# Patient Record
Sex: MALE | Race: WHITE | Employment: FULL TIME | URBAN - NONMETROPOLITAN AREA
[De-identification: names, ages, dates, MRNs, and addresses within clinical notes are randomized per-mention and may not be internally consistent; named-entity substitution may affect disease eponyms.]

---

## 2024-04-25 ENCOUNTER — HOSPITAL ENCOUNTER (EMERGENCY)
Age: 54
Discharge: HOME | End: 2024-04-25
Attending: EMERGENCY MEDICINE

## 2024-04-25 VITALS
SYSTOLIC BLOOD PRESSURE: 190 MMHG | OXYGEN SATURATION: 93 % | DIASTOLIC BLOOD PRESSURE: 149 MMHG | WEIGHT: 271.7 LBS | HEIGHT: 69 IN | RESPIRATION RATE: 20 BRPM | BODY MASS INDEX: 40.24 KG/M2 | HEART RATE: 112 BPM | TEMPERATURE: 97.9 F

## 2024-04-25 DIAGNOSIS — I10 ESSENTIAL HYPERTENSION: ICD-10-CM

## 2024-04-25 DIAGNOSIS — R20.2 PARESTHESIA: Primary | ICD-10-CM

## 2024-04-25 LAB
ANION GAP SERPL CALCULATED.3IONS-SCNC: 13 MMOL/L (ref 9–17)
BUN SERPL-MCNC: 11 MG/DL (ref 6–20)
BUN/CREAT SERPL: 9 (ref 9–20)
CALCIUM SERPL-MCNC: 8.6 MG/DL (ref 8.6–10.4)
CHLORIDE SERPL-SCNC: 101 MMOL/L (ref 98–107)
CO2 SERPL-SCNC: 23 MMOL/L (ref 20–31)
CREAT SERPL-MCNC: 1.2 MG/DL (ref 0.7–1.2)
ERYTHROCYTE [DISTWIDTH] IN BLOOD BY AUTOMATED COUNT: 14.3 % (ref 12.1–15.2)
GFR SERPL CREATININE-BSD FRML MDRD: 72 ML/MIN/1.73M2
GLUCOSE SERPL-MCNC: 121 MG/DL (ref 70–99)
HCT VFR BLD AUTO: 52.1 % (ref 41–53)
HGB BLD-MCNC: 17.3 G/DL (ref 13.5–17.5)
MCH RBC QN AUTO: 27.3 PG (ref 26–34)
MCHC RBC AUTO-ENTMCNC: 33.2 G/DL (ref 31–37)
MCV RBC AUTO: 82.3 FL (ref 80–100)
PLATELET # BLD AUTO: 266 K/UL (ref 140–450)
PMV BLD AUTO: 9.1 FL (ref 6–12)
POTASSIUM SERPL-SCNC: 3.8 MMOL/L (ref 3.7–5.3)
RBC # BLD AUTO: 6.33 M/UL (ref 4.5–5.9)
SODIUM SERPL-SCNC: 137 MMOL/L (ref 135–144)
TROPONIN I SERPL HS-MCNC: 32 NG/L (ref 0–22)
WBC OTHER # BLD: 10.2 K/UL (ref 3.5–11)

## 2024-04-25 PROCEDURE — 96374 THER/PROPH/DIAG INJ IV PUSH: CPT

## 2024-04-25 PROCEDURE — 99284 EMERGENCY DEPT VISIT MOD MDM: CPT

## 2024-04-25 PROCEDURE — 6360000002 HC RX W HCPCS: Performed by: EMERGENCY MEDICINE

## 2024-04-25 PROCEDURE — 84484 ASSAY OF TROPONIN QUANT: CPT

## 2024-04-25 PROCEDURE — 36415 COLL VENOUS BLD VENIPUNCTURE: CPT

## 2024-04-25 PROCEDURE — 85027 COMPLETE CBC AUTOMATED: CPT

## 2024-04-25 PROCEDURE — 93005 ELECTROCARDIOGRAM TRACING: CPT | Performed by: EMERGENCY MEDICINE

## 2024-04-25 PROCEDURE — 80048 BASIC METABOLIC PNL TOTAL CA: CPT

## 2024-04-25 RX ORDER — METOPROLOL SUCCINATE 50 MG/1
50 TABLET, EXTENDED RELEASE ORAL DAILY
Qty: 90 TABLET | Refills: 1 | Status: SHIPPED | OUTPATIENT
Start: 2024-04-25

## 2024-04-25 RX ORDER — LABETALOL HYDROCHLORIDE 5 MG/ML
10 INJECTION, SOLUTION INTRAVENOUS ONCE
Status: COMPLETED | OUTPATIENT
Start: 2024-04-25 | End: 2024-04-25

## 2024-04-25 RX ADMIN — LABETALOL HYDROCHLORIDE 10 MG: 5 INJECTION INTRAVENOUS at 11:53

## 2024-04-25 ASSESSMENT — LIFESTYLE VARIABLES
HOW MANY STANDARD DRINKS CONTAINING ALCOHOL DO YOU HAVE ON A TYPICAL DAY: PATIENT DOES NOT DRINK
HOW OFTEN DO YOU HAVE A DRINK CONTAINING ALCOHOL: NEVER

## 2024-04-25 ASSESSMENT — PAIN - FUNCTIONAL ASSESSMENT: PAIN_FUNCTIONAL_ASSESSMENT: NONE - DENIES PAIN

## 2024-04-25 ASSESSMENT — ENCOUNTER SYMPTOMS
EYE DISCHARGE: 0
SHORTNESS OF BREATH: 0
WHEEZING: 0

## 2024-04-25 NOTE — ED TRIAGE NOTES
Patient states that he does not taking any daily medications at this time, also he states he has no medical or surgical history to report at this time. Daughter and grandson at bedside.

## 2024-04-25 NOTE — ED NOTES
Patient states he does not have insurance, states he needs to talk to his work. Patient was given a list of Primary care physicians and told to figure out his insurance then make an appointment. Patient verbalized understanding.

## 2024-04-25 NOTE — ED PROVIDER NOTES
more data below for the lab and radiology tests and orders.    3)  Treatment and Disposition    Patient repeat assessment:  bp improved with BB will dc home on oral BB    Disposition discussion with patient/family:      Case discussed with consulting clinician:      Social determinants of health impacting treatment or disposition:      Shared Decision Making:      Code Status Discussion:        REASSESSMENT            CRITICAL CARE TIME     Total Critical Care time was  minutes, excluding separately reportable procedures.  There was a high probability of clinically significant/life threatening deterioration in the patient's condition which required my urgent intervention.      PROCEDURES:  Unless otherwise noted below, none     Procedures    FINAL IMPRESSION      1. Paresthesia    2. Essential hypertension          DISPOSITION/PLAN     DISPOSITION Decision To Discharge 04/25/2024 12:30:57 PM      PATIENT REFERRED TO:  [facility]  [address] [address]  [phone]  In 1 week        DISCHARGE MEDICATIONS:  New Prescriptions    METOPROLOL SUCCINATE (TOPROL XL) 50 MG EXTENDED RELEASE TABLET    Take 1 tablet by mouth daily       (Please note that portions of this note were completed with a voice recognition program.  Efforts were made to edit the dictations but occasionally words are mis-transcribed.)    Hardik Chanel MD (electronically signed)  Attending Emergency Physician           Hardik Chanel MD  04/25/24 [id number]

## 2024-04-26 LAB
EKG ATRIAL RATE: 114 BPM
EKG P AXIS: 57 DEGREES
EKG P-R INTERVAL: 142 MS
EKG Q-T INTERVAL: 354 MS
EKG QRS DURATION: 100 MS
EKG QTC CALCULATION (BAZETT): 487 MS
EKG R AXIS: 36 DEGREES
EKG T AXIS: 66 DEGREES
EKG VENTRICULAR RATE: 114 BPM

## 2024-04-26 PROCEDURE — 93010 ELECTROCARDIOGRAM REPORT: CPT | Performed by: INTERNAL MEDICINE

## 2024-06-11 ENCOUNTER — OFFICE VISIT (OUTPATIENT)
Dept: CARDIAC SURGERY | Facility: CLINIC | Age: 54
End: 2024-06-11

## 2024-06-11 VITALS
HEIGHT: 69 IN | SYSTOLIC BLOOD PRESSURE: 153 MMHG | OXYGEN SATURATION: 98 % | DIASTOLIC BLOOD PRESSURE: 84 MMHG | WEIGHT: 246 LBS | HEART RATE: 69 BPM | TEMPERATURE: 98.4 F | BODY MASS INDEX: 36.43 KG/M2

## 2024-06-11 DIAGNOSIS — I25.119 CORONARY ARTERY DISEASE INVOLVING NATIVE CORONARY ARTERY OF NATIVE HEART WITH ANGINA PECTORIS (CMS-HCC): Primary | ICD-10-CM

## 2024-06-11 DIAGNOSIS — I25.118 CORONARY ARTERY DISEASE OF NATIVE ARTERY OF NATIVE HEART WITH STABLE ANGINA PECTORIS (CMS-HCC): ICD-10-CM

## 2024-06-11 PROBLEM — I10 HTN (HYPERTENSION): Status: ACTIVE | Noted: 2024-06-11

## 2024-06-11 PROBLEM — G47.33 OSA (OBSTRUCTIVE SLEEP APNEA): Status: ACTIVE | Noted: 2024-06-11

## 2024-06-11 PROBLEM — I63.9 CVA (CEREBRAL VASCULAR ACCIDENT) (MULTI): Status: ACTIVE | Noted: 2024-06-11

## 2024-06-11 PROBLEM — E78.5 DYSLIPIDEMIA: Status: ACTIVE | Noted: 2024-06-11

## 2024-06-11 PROCEDURE — 3077F SYST BP >= 140 MM HG: CPT | Performed by: THORACIC SURGERY (CARDIOTHORACIC VASCULAR SURGERY)

## 2024-06-11 PROCEDURE — 99205 OFFICE O/P NEW HI 60 MIN: CPT | Performed by: THORACIC SURGERY (CARDIOTHORACIC VASCULAR SURGERY)

## 2024-06-11 PROCEDURE — 3079F DIAST BP 80-89 MM HG: CPT | Performed by: THORACIC SURGERY (CARDIOTHORACIC VASCULAR SURGERY)

## 2024-06-11 PROCEDURE — 99215 OFFICE O/P EST HI 40 MIN: CPT | Performed by: THORACIC SURGERY (CARDIOTHORACIC VASCULAR SURGERY)

## 2024-06-11 RX ORDER — LEVOTHYROXINE SODIUM 50 UG/1
50 TABLET ORAL
COMMUNITY
Start: 2024-06-05

## 2024-06-11 RX ORDER — METOPROLOL SUCCINATE 50 MG/1
50 TABLET, EXTENDED RELEASE ORAL
COMMUNITY
Start: 2024-04-25

## 2024-06-11 RX ORDER — FUROSEMIDE 40 MG/1
40 TABLET ORAL
COMMUNITY
Start: 2024-06-04

## 2024-06-11 RX ORDER — ATORVASTATIN CALCIUM 40 MG/1
40 TABLET, FILM COATED ORAL
COMMUNITY
Start: 2024-06-04

## 2024-06-11 RX ORDER — LISINOPRIL 10 MG/1
10 TABLET ORAL
COMMUNITY
Start: 2024-06-03

## 2024-06-11 RX ORDER — ASPIRIN 81 MG/1
1 TABLET ORAL
COMMUNITY
Start: 2024-04-28

## 2024-06-11 ASSESSMENT — PATIENT HEALTH QUESTIONNAIRE - PHQ9
1. LITTLE INTEREST OR PLEASURE IN DOING THINGS: NOT AT ALL
SUM OF ALL RESPONSES TO PHQ9 QUESTIONS 1 AND 2: 0
2. FEELING DOWN, DEPRESSED OR HOPELESS: NOT AT ALL

## 2024-06-12 PROBLEM — I25.118 CORONARY ARTERY DISEASE OF NATIVE ARTERY OF NATIVE HEART WITH STABLE ANGINA PECTORIS (CMS-HCC): Status: ACTIVE | Noted: 2024-06-11

## 2024-06-12 RX ORDER — SODIUM CHLORIDE, SODIUM LACTATE, POTASSIUM CHLORIDE, CALCIUM CHLORIDE 600; 310; 30; 20 MG/100ML; MG/100ML; MG/100ML; MG/100ML
20 INJECTION, SOLUTION INTRAVENOUS CONTINUOUS
OUTPATIENT
Start: 2024-06-12

## 2024-06-12 NOTE — PROGRESS NOTES
Chief Complaint  Stroke symptoms, arm weakness and slurred speech    HPI:   Mr. Kamar Garcia is an 53 y.o. male, who is a patient of Dr. Cabrera   I have been asked to see them to evaluate multivessel coronary artery disease.       Past Medical History:   Diagnosis Date    Dyslipidemia     HTN (hypertension)     Stroke (Multi)        Past Surgical History:   Procedure Laterality Date    HERNIA REPAIR         Family History   Problem Relation Name Age of Onset    Pancreatic cancer Mother      COPD Father         Social History     Socioeconomic History    Marital status: Unknown     Spouse name: Not on file    Number of children: Not on file    Years of education: Not on file    Highest education level: Not on file   Occupational History    Not on file   Tobacco Use    Smoking status: Former     Types: Cigarettes    Smokeless tobacco: Former     Types: Snuff   Substance and Sexual Activity    Alcohol use: Never    Drug use: Never    Sexual activity: Not on file   Other Topics Concern    Not on file   Social History Narrative    Not on file     Social Determinants of Health     Financial Resource Strain: Not on file   Food Insecurity: Not on file   Transportation Needs: Not on file   Physical Activity: Not on file   Stress: Not on file   Social Connections: Not on file   Intimate Partner Violence: Not on file   Housing Stability: Not on file       No Known Allergies    Outpatient Encounter Medications as of 6/11/2024   Medication Sig Dispense Refill    aspirin 81 mg EC tablet Take 1 tablet (81 mg) by mouth early in the morning..      atorvastatin (Lipitor) 40 mg tablet Take 1 tablet (40 mg) by mouth early in the morning..      furosemide (Lasix) 40 mg tablet Take 1 tablet (40 mg) by mouth early in the morning..      levothyroxine (Synthroid, Levoxyl) 50 mcg tablet Take 1 tablet (50 mcg) by mouth early in the morning..      lisinopril 10 mg tablet Take 1 tablet (10 mg) by mouth early in the morning..      metoprolol  succinate XL (Toprol-XL) 50 mg 24 hr tablet Take 1 tablet (50 mg) by mouth early in the morning..       No facility-administered encounter medications on file as of 6/11/2024.         Physical Exam  Constitutional:       General: He is not in acute distress.     Cardiovascular:      Rate and Rhythm: Normal rate.     Pulses: Normal pulses.      Heart sounds:Normal  Pulmonary:      Effort: Pulmonary effort is normal.      Breath sounds: Normal breath sounds.      Neurological:      General: No focal deficit present.      Mental Status: He is alert.     Assessment and Plan:   Mr. Kamar Garcia is an 53 y.o. male who presents with arm weakness and slurred speech.  His MRI showed area of restricted diffusion present in the superior junction of the right frontal and parietal lobes consistent with an acute/subacute ischemic infarct during investigations he had an echo that showed an ejection fraction of 35 to 40%  His left cath showed multivessel coronary artery disease including his LAD, D1, circumflex, OM1, OM 2 and RCA stenosis which were confirmed by FFR    We had a nice discussion regarding the indications for intervention on his coronary artery disease.  This included percutaneous as well as open surgical approaches. I do believe that surgical coronary revascularization is in his best interest, given the coronary anatomy and presentation of his symptoms.  He understands that the goal of this procedure will be to relieve symptoms if present, preserve left ventricular function, prevent future myocardial infarction, and prolong life.  I discussed the specific risks of bleeding, infection, transfusion, myocardial infarction, stroke, renal failure, up to and including death.  He has agreed to surgery which will be scheduled at [facility] most probably June 26th   My operative plan will be for on-pump, arrested heart, surgical coronary revascularization using BIMA if feasible and veins     Hubert Lovell MD

## 2024-06-12 NOTE — H&P (VIEW-ONLY)
Chief Complaint  Stroke symptoms, arm weakness and slurred speech    HPI:   Mr. Kamar Garcia is an 53 y.o. male, who is a patient of Dr. Cabrera   I have been asked to see them to evaluate multivessel coronary artery disease.       Past Medical History:   Diagnosis Date    Dyslipidemia     HTN (hypertension)     Stroke (Multi)        Past Surgical History:   Procedure Laterality Date    HERNIA REPAIR         Family History   Problem Relation Name Age of Onset    Pancreatic cancer Mother      COPD Father         Social History     Socioeconomic History    Marital status: Unknown     Spouse name: Not on file    Number of children: Not on file    Years of education: Not on file    Highest education level: Not on file   Occupational History    Not on file   Tobacco Use    Smoking status: Former     Types: Cigarettes    Smokeless tobacco: Former     Types: Snuff   Substance and Sexual Activity    Alcohol use: Never    Drug use: Never    Sexual activity: Not on file   Other Topics Concern    Not on file   Social History Narrative    Not on file     Social Determinants of Health     Financial Resource Strain: Not on file   Food Insecurity: Not on file   Transportation Needs: Not on file   Physical Activity: Not on file   Stress: Not on file   Social Connections: Not on file   Intimate Partner Violence: Not on file   Housing Stability: Not on file       No Known Allergies    Outpatient Encounter Medications as of 6/11/2024   Medication Sig Dispense Refill    aspirin 81 mg EC tablet Take 1 tablet (81 mg) by mouth early in the morning..      atorvastatin (Lipitor) 40 mg tablet Take 1 tablet (40 mg) by mouth early in the morning..      furosemide (Lasix) 40 mg tablet Take 1 tablet (40 mg) by mouth early in the morning..      levothyroxine (Synthroid, Levoxyl) 50 mcg tablet Take 1 tablet (50 mcg) by mouth early in the morning..      lisinopril 10 mg tablet Take 1 tablet (10 mg) by mouth early in the morning..      metoprolol  succinate XL (Toprol-XL) 50 mg 24 hr tablet Take 1 tablet (50 mg) by mouth early in the morning..       No facility-administered encounter medications on file as of 6/11/2024.         Physical Exam  Constitutional:       General: He is not in acute distress.     Cardiovascular:      Rate and Rhythm: Normal rate.     Pulses: Normal pulses.      Heart sounds:Normal  Pulmonary:      Effort: Pulmonary effort is normal.      Breath sounds: Normal breath sounds.      Neurological:      General: No focal deficit present.      Mental Status: He is alert.     Assessment and Plan:   Mr. Kamar Garcia is an 53 y.o. male who presents with arm weakness and slurred speech.  His MRI showed area of restricted diffusion present in the superior junction of the right frontal and parietal lobes consistent with an acute/subacute ischemic infarct during investigations he had an echo that showed an ejection fraction of 35 to 40%  His left cath showed multivessel coronary artery disease including his LAD, D1, circumflex, OM1, OM 2 and RCA stenosis which were confirmed by FFR    We had a nice discussion regarding the indications for intervention on his coronary artery disease.  This included percutaneous as well as open surgical approaches. I do believe that surgical coronary revascularization is in his best interest, given the coronary anatomy and presentation of his symptoms.  He understands that the goal of this procedure will be to relieve symptoms if present, preserve left ventricular function, prevent future myocardial infarction, and prolong life.  I discussed the specific risks of bleeding, infection, transfusion, myocardial infarction, stroke, renal failure, up to and including death.  He has agreed to surgery which will be scheduled at [facility] most probably June 26th   My operative plan will be for on-pump, arrested heart, surgical coronary revascularization using BIMA if feasible and veins     Hubert Lovell MD

## 2024-06-19 ENCOUNTER — HOSPITAL ENCOUNTER (OUTPATIENT)
Dept: RADIOLOGY | Facility: HOSPITAL | Age: 54
Discharge: HOME | End: 2024-06-19

## 2024-06-19 ENCOUNTER — LAB (OUTPATIENT)
Dept: LAB | Facility: HOSPITAL | Age: 54
End: 2024-06-19

## 2024-06-19 ENCOUNTER — HOSPITAL ENCOUNTER (OUTPATIENT)
Dept: RESPIRATORY THERAPY | Facility: HOSPITAL | Age: 54
Discharge: HOME | End: 2024-06-19

## 2024-06-19 DIAGNOSIS — I25.119 CORONARY ARTERY DISEASE INVOLVING NATIVE CORONARY ARTERY OF NATIVE HEART WITH ANGINA PECTORIS (CMS-HCC): ICD-10-CM

## 2024-06-19 LAB
ABO GROUP (TYPE) IN BLOOD: NORMAL
ALBUMIN SERPL BCP-MCNC: 4.6 G/DL (ref 3.4–5)
ALP SERPL-CCNC: 133 U/L (ref 33–120)
ALT SERPL W P-5'-P-CCNC: 100 U/L (ref 10–52)
ANION GAP BLDA CALCULATED.4IONS-SCNC: 14 MMO/L (ref 10–25)
ANION GAP SERPL CALC-SCNC: 16 MMOL/L (ref 10–20)
ANTIBODY SCREEN: NORMAL
APTT PPP: 34 SECONDS (ref 27–38)
ARTERIAL PATENCY WRIST A: POSITIVE
AST SERPL W P-5'-P-CCNC: 40 U/L (ref 9–39)
BASE EXCESS BLDA CALC-SCNC: -0.5 MMOL/L (ref -2–3)
BILIRUB SERPL-MCNC: 1.3 MG/DL (ref 0–1.2)
BODY TEMPERATURE: ABNORMAL
BUN SERPL-MCNC: 29 MG/DL (ref 6–23)
CA-I BLDA-SCNC: 1.24 MMOL/L (ref 1.1–1.33)
CALCIUM SERPL-MCNC: 10.1 MG/DL (ref 8.6–10.3)
CHLORIDE BLDA-SCNC: 100 MMOL/L (ref 98–107)
CHLORIDE SERPL-SCNC: 104 MMOL/L (ref 98–107)
CO2 SERPL-SCNC: 24 MMOL/L (ref 21–32)
CREAT SERPL-MCNC: 1.33 MG/DL (ref 0.5–1.3)
EGFRCR SERPLBLD CKD-EPI 2021: 64 ML/MIN/1.73M*2
ERYTHROCYTE [DISTWIDTH] IN BLOOD BY AUTOMATED COUNT: 14.4 % (ref 11.5–14.5)
FIBRINOGEN PPP-MCNC: 415 MG/DL (ref 200–400)
GLUCOSE BLDA-MCNC: 178 MG/DL (ref 74–99)
GLUCOSE SERPL-MCNC: 101 MG/DL (ref 74–99)
HCO3 BLDA-SCNC: 23.4 MMOL/L (ref 22–26)
HCT VFR BLD AUTO: 57 % (ref 41–52)
HCT VFR BLD EST: 55 % (ref 41–52)
HGB BLD-MCNC: 19.3 G/DL (ref 13.5–17.5)
HGB BLDA-MCNC: 18.3 G/DL (ref 13.5–17.5)
INHALED O2 CONCENTRATION: 21 %
INR PPP: 1 (ref 0.9–1.1)
LACTATE BLDA-SCNC: 1.5 MMOL/L (ref 0.4–2)
MAGNESIUM SERPL-MCNC: 2.26 MG/DL (ref 1.6–2.4)
MCH RBC QN AUTO: 27.8 PG (ref 26–34)
MCHC RBC AUTO-ENTMCNC: 33.9 G/DL (ref 32–36)
MCV RBC AUTO: 82 FL (ref 80–100)
MGC ASCENT PFT - FEV1 - POST: 2.81
MGC ASCENT PFT - FEV1 - POST: 2.81
MGC ASCENT PFT - FEV1 - PRE: 2.75
MGC ASCENT PFT - FEV1 - PRE: 2.75
MGC ASCENT PFT - FEV1 - PREDICTED: 3.48
MGC ASCENT PFT - FEV1 - PREDICTED: 3.48
MGC ASCENT PFT - FVC - POST: 3.8
MGC ASCENT PFT - FVC - POST: 3.8
MGC ASCENT PFT - FVC - PRE: 3.7
MGC ASCENT PFT - FVC - PRE: 3.7
MGC ASCENT PFT - FVC - PREDICTED: 4.38
MGC ASCENT PFT - FVC - PREDICTED: 4.38
NRBC BLD-RTO: 0 /100 WBCS (ref 0–0)
OXYHGB MFR BLDA: 96.2 % (ref 94–98)
PCO2 BLDA: 36 MM HG (ref 38–42)
PH BLDA: 7.42 PH (ref 7.38–7.42)
PLATELET # BLD AUTO: 312 X10*3/UL (ref 150–450)
PO2 BLDA: 92 MM HG (ref 85–95)
POTASSIUM BLDA-SCNC: 4.2 MMOL/L (ref 3.5–5.3)
POTASSIUM SERPL-SCNC: 4.7 MMOL/L (ref 3.5–5.3)
PROT SERPL-MCNC: 7.9 G/DL (ref 6.4–8.2)
PROTHROMBIN TIME: 11.7 SECONDS (ref 9.8–12.8)
RBC # BLD AUTO: 6.93 X10*6/UL (ref 4.5–5.9)
RH FACTOR (ANTIGEN D): NORMAL
SAO2 % BLDA: 99 % (ref 94–100)
SODIUM BLDA-SCNC: 133 MMOL/L (ref 136–145)
SODIUM SERPL-SCNC: 139 MMOL/L (ref 136–145)
SPECIMEN DRAWN FROM PATIENT: ABNORMAL
WBC # BLD AUTO: 11.4 X10*3/UL (ref 4.4–11.3)

## 2024-06-19 PROCEDURE — 80053 COMPREHEN METABOLIC PANEL: CPT

## 2024-06-19 PROCEDURE — 71250 CT THORAX DX C-: CPT

## 2024-06-19 PROCEDURE — 86901 BLOOD TYPING SEROLOGIC RH(D): CPT

## 2024-06-19 PROCEDURE — 94060 EVALUATION OF WHEEZING: CPT

## 2024-06-19 PROCEDURE — 36600 WITHDRAWAL OF ARTERIAL BLOOD: CPT

## 2024-06-19 PROCEDURE — 93970 EXTREMITY STUDY: CPT

## 2024-06-19 PROCEDURE — 85027 COMPLETE CBC AUTOMATED: CPT

## 2024-06-19 PROCEDURE — 93880 EXTRACRANIAL BILAT STUDY: CPT

## 2024-06-19 PROCEDURE — 86920 COMPATIBILITY TEST SPIN: CPT

## 2024-06-19 PROCEDURE — 83735 ASSAY OF MAGNESIUM: CPT

## 2024-06-19 PROCEDURE — 84132 ASSAY OF SERUM POTASSIUM: CPT | Performed by: THORACIC SURGERY (CARDIOTHORACIC VASCULAR SURGERY)

## 2024-06-19 PROCEDURE — 85610 PROTHROMBIN TIME: CPT

## 2024-06-19 PROCEDURE — 85384 FIBRINOGEN ACTIVITY: CPT

## 2024-06-19 PROCEDURE — 93880 EXTRACRANIAL BILAT STUDY: CPT | Performed by: RADIOLOGY

## 2024-06-19 PROCEDURE — 36415 COLL VENOUS BLD VENIPUNCTURE: CPT

## 2024-06-21 RX ORDER — MUPIROCIN 20 MG/G
OINTMENT TOPICAL
COMMUNITY
Start: 2024-06-11 | End: 2024-07-04 | Stop reason: HOSPADM

## 2024-06-27 ENCOUNTER — PREP FOR PROCEDURE (OUTPATIENT)
Dept: VASCULAR SURGERY | Facility: HOSPITAL | Age: 54
End: 2024-06-27
Payer: MEDICAID

## 2024-06-27 ENCOUNTER — ANESTHESIA EVENT (OUTPATIENT)
Dept: OPERATING ROOM | Facility: HOSPITAL | Age: 54
End: 2024-06-27

## 2024-06-27 ENCOUNTER — TELEPHONE (OUTPATIENT)
Dept: VASCULAR SURGERY | Facility: HOSPITAL | Age: 54
End: 2024-06-27

## 2024-06-27 DIAGNOSIS — I25.118 CORONARY ARTERY DISEASE OF NATIVE ARTERY OF NATIVE HEART WITH STABLE ANGINA PECTORIS (CMS-HCC): Primary | ICD-10-CM

## 2024-06-27 RX ORDER — MUPIROCIN 20 MG/G
0.5 OINTMENT TOPICAL 2 TIMES DAILY
Status: CANCELLED | OUTPATIENT
Start: 2024-06-28 | End: 2024-07-03

## 2024-06-28 ENCOUNTER — APPOINTMENT (OUTPATIENT)
Dept: RADIOLOGY | Facility: HOSPITAL | Age: 54
End: 2024-06-28
Payer: MEDICAID

## 2024-06-28 ENCOUNTER — HOSPITAL ENCOUNTER (OUTPATIENT)
Dept: OPERATING ROOM | Facility: HOSPITAL | Age: 54
Discharge: HOME | End: 2024-06-28

## 2024-06-28 ENCOUNTER — APPOINTMENT (OUTPATIENT)
Dept: CARDIOLOGY | Facility: HOSPITAL | Age: 54
End: 2024-06-28
Payer: MEDICAID

## 2024-06-28 ENCOUNTER — DOCUMENTATION (OUTPATIENT)
Dept: PODIATRY | Facility: CLINIC | Age: 54
End: 2024-06-28

## 2024-06-28 ENCOUNTER — ANESTHESIA (OUTPATIENT)
Dept: OPERATING ROOM | Facility: HOSPITAL | Age: 54
End: 2024-06-28

## 2024-06-28 ENCOUNTER — HOSPITAL ENCOUNTER (INPATIENT)
Facility: HOSPITAL | Age: 54
End: 2024-06-28
Attending: THORACIC SURGERY (CARDIOTHORACIC VASCULAR SURGERY) | Admitting: NURSE PRACTITIONER

## 2024-06-28 DIAGNOSIS — I48.91 POSTOPERATIVE ATRIAL FIBRILLATION (MULTI): ICD-10-CM

## 2024-06-28 DIAGNOSIS — I25.118 CORONARY ARTERY DISEASE OF NATIVE ARTERY OF NATIVE HEART WITH STABLE ANGINA PECTORIS: Primary | ICD-10-CM

## 2024-06-28 DIAGNOSIS — I25.84 CORONARY ATHEROSCLEROSIS DUE TO CALCIFIED CORONARY LESION (CODE): ICD-10-CM

## 2024-06-28 DIAGNOSIS — G89.18 ACUTE POST-OPERATIVE PAIN: ICD-10-CM

## 2024-06-28 DIAGNOSIS — I97.89 POSTOPERATIVE ATRIAL FIBRILLATION (MULTI): ICD-10-CM

## 2024-06-28 DIAGNOSIS — Z95.1 S/P CABG X 3: ICD-10-CM

## 2024-06-28 DIAGNOSIS — I50.9 ACUTE HEART FAILURE, UNSPECIFIED HEART FAILURE TYPE: ICD-10-CM

## 2024-06-28 PROBLEM — E03.9 HYPOTHYROIDISM: Status: ACTIVE | Noted: 2024-05-06

## 2024-06-28 PROBLEM — I42.9 CARDIOMYOPATHY (MULTI): Status: ACTIVE | Noted: 2024-05-02

## 2024-06-28 PROBLEM — I63.311 CEREBROVASCULAR ACCIDENT (CVA) DUE TO THROMBOSIS OF RIGHT MIDDLE CEREBRAL ARTERY (MULTI): Status: ACTIVE | Noted: 2024-05-23

## 2024-06-28 LAB
ABO GROUP (TYPE) IN BLOOD: NORMAL
ACT BLD: 106 SEC (ref 96–152)
ACT BLD: 526 SEC (ref 96–152)
ACT BLD: 540 SEC (ref 96–152)
ACT BLD: 584 SEC (ref 96–152)
ACT BLD: 870 SEC (ref 96–152)
ACT BLD: 95 SEC (ref 96–152)
ALBUMIN SERPL BCP-MCNC: 3.6 G/DL (ref 3.4–5)
ANION GAP BLDA CALCULATED.4IONS-SCNC: 11 MMO/L (ref 10–25)
ANION GAP BLDA CALCULATED.4IONS-SCNC: 3 MMO/L (ref 10–25)
ANION GAP BLDA CALCULATED.4IONS-SCNC: 6 MMO/L (ref 10–25)
ANION GAP BLDA CALCULATED.4IONS-SCNC: 7 MMO/L (ref 10–25)
ANION GAP BLDA CALCULATED.4IONS-SCNC: 8 MMO/L (ref 10–25)
ANION GAP BLDA CALCULATED.4IONS-SCNC: 8 MMO/L (ref 10–25)
ANION GAP BLDA CALCULATED.4IONS-SCNC: 9 MMO/L (ref 10–25)
ANION GAP BLDA CALCULATED.4IONS-SCNC: 9 MMO/L (ref 10–25)
ANION GAP BLDMV CALCULATED.4IONS-SCNC: 9 MMO/L (ref 10–25)
ANION GAP BLDV CALCULATED.4IONS-SCNC: 6 MMOL/L (ref 10–25)
ANION GAP SERPL CALC-SCNC: 13 MMOL/L (ref 10–20)
ANION GAP SERPL CALC-SCNC: 13 MMOL/L (ref 10–20)
AORTIC VALVE MEAN GRADIENT: 3 MMHG
AORTIC VALVE PEAK VELOCITY: 1.36 M/S
APTT PPP: 30 SECONDS (ref 27–38)
ATRIAL RATE: 78 BPM
AV PEAK GRADIENT: 7.4 MMHG
BASE EXCESS BLDA CALC-SCNC: -0.3 MMOL/L (ref -2–3)
BASE EXCESS BLDA CALC-SCNC: -2.7 MMOL/L (ref -2–3)
BASE EXCESS BLDA CALC-SCNC: -4.2 MMOL/L (ref -2–3)
BASE EXCESS BLDA CALC-SCNC: -4.2 MMOL/L (ref -2–3)
BASE EXCESS BLDA CALC-SCNC: 0 MMOL/L (ref -2–3)
BASE EXCESS BLDA CALC-SCNC: 0.4 MMOL/L (ref -2–3)
BASE EXCESS BLDA CALC-SCNC: 0.7 MMOL/L (ref -2–3)
BASE EXCESS BLDA CALC-SCNC: 0.7 MMOL/L (ref -2–3)
BASE EXCESS BLDA CALC-SCNC: 2.3 MMOL/L (ref -2–3)
BASE EXCESS BLDMV CALC-SCNC: -0.3 MMOL/L (ref -2–3)
BASE EXCESS BLDV CALC-SCNC: -0.3 MMOL/L (ref -2–3)
BLOOD EXPIRATION DATE: NORMAL
BLOOD EXPIRATION DATE: NORMAL
BODY TEMPERATURE: 37 DEGREES CELSIUS
BODY TEMPERATURE: 37 DEGREES CELSIUS
BODY TEMPERATURE: ABNORMAL
BUN SERPL-MCNC: 21 MG/DL (ref 6–23)
BUN SERPL-MCNC: 21 MG/DL (ref 6–23)
CA-I BLDA-SCNC: 1.15 MMOL/L (ref 1.1–1.33)
CA-I BLDA-SCNC: 1.17 MMOL/L (ref 1.1–1.33)
CA-I BLDA-SCNC: 1.17 MMOL/L (ref 1.1–1.33)
CA-I BLDA-SCNC: 1.21 MMOL/L (ref 1.1–1.33)
CA-I BLDA-SCNC: 1.24 MMOL/L (ref 1.1–1.33)
CA-I BLDA-SCNC: 1.26 MMOL/L (ref 1.1–1.33)
CA-I BLDA-SCNC: 1.27 MMOL/L (ref 1.1–1.33)
CA-I BLDA-SCNC: 1.42 MMOL/L (ref 1.1–1.33)
CA-I BLDMV-SCNC: 1.29 MMOL/L (ref 1.1–1.33)
CA-I BLDV-SCNC: 1.2 MMOL/L (ref 1.1–1.33)
CALCIUM SERPL-MCNC: 9.2 MG/DL (ref 8.6–10.3)
CALCIUM SERPL-MCNC: 9.2 MG/DL (ref 8.6–10.3)
CHLORIDE BLD-SCNC: 102 MMOL/L (ref 98–107)
CHLORIDE BLDA-SCNC: 102 MMOL/L (ref 98–107)
CHLORIDE BLDA-SCNC: 103 MMOL/L (ref 98–107)
CHLORIDE BLDA-SCNC: 103 MMOL/L (ref 98–107)
CHLORIDE BLDA-SCNC: 107 MMOL/L (ref 98–107)
CHLORIDE BLDV-SCNC: 102 MMOL/L (ref 98–107)
CHLORIDE SERPL-SCNC: 105 MMOL/L (ref 98–107)
CHLORIDE SERPL-SCNC: 105 MMOL/L (ref 98–107)
CO2 SERPL-SCNC: 24 MMOL/L (ref 21–32)
CO2 SERPL-SCNC: 24 MMOL/L (ref 21–32)
COHGB MFR BLDA: 1 %
COHGB MFR BLDA: 1.2 %
COHGB MFR BLDA: 1.4 %
COHGB MFR BLDA: 1.4 %
COHGB MFR BLDA: 1.6 %
COHGB MFR BLDA: 1.8 %
COHGB MFR BLDA: 2 %
CREAT SERPL-MCNC: 1.08 MG/DL (ref 0.5–1.3)
CREAT SERPL-MCNC: 1.08 MG/DL (ref 0.5–1.3)
DISPENSE STATUS: NORMAL
DISPENSE STATUS: NORMAL
DO-HGB MFR BLDA: 0 % (ref 0–5)
DO-HGB MFR BLDA: 0.1 % (ref 0–5)
DO-HGB MFR BLDA: 0.7 % (ref 0–5)
DO-HGB MFR BLDA: 0.7 % (ref 0–5)
EGFRCR SERPLBLD CKD-EPI 2021: 82 ML/MIN/1.73M*2
EGFRCR SERPLBLD CKD-EPI 2021: 82 ML/MIN/1.73M*2
EJECTION FRACTION APICAL 4 CHAMBER: 50.4
EJECTION FRACTION: 43 %
ERYTHROCYTE [DISTWIDTH] IN BLOOD BY AUTOMATED COUNT: 13.5 % (ref 11.5–14.5)
FIBRINOGEN PPP-MCNC: 243 MG/DL (ref 200–400)
GLUCOSE BLD MANUAL STRIP-MCNC: 131 MG/DL (ref 74–99)
GLUCOSE BLD MANUAL STRIP-MCNC: 159 MG/DL (ref 74–99)
GLUCOSE BLD-MCNC: 119 MG/DL (ref 74–99)
GLUCOSE BLDA-MCNC: 119 MG/DL (ref 74–99)
GLUCOSE BLDA-MCNC: 122 MG/DL (ref 74–99)
GLUCOSE BLDA-MCNC: 122 MG/DL (ref 74–99)
GLUCOSE BLDA-MCNC: 126 MG/DL (ref 74–99)
GLUCOSE BLDA-MCNC: 131 MG/DL (ref 74–99)
GLUCOSE BLDA-MCNC: 132 MG/DL (ref 74–99)
GLUCOSE BLDA-MCNC: 145 MG/DL (ref 74–99)
GLUCOSE BLDA-MCNC: 206 MG/DL (ref 74–99)
GLUCOSE BLDV-MCNC: 144 MG/DL (ref 74–99)
GLUCOSE SERPL-MCNC: 121 MG/DL (ref 74–99)
GLUCOSE SERPL-MCNC: 121 MG/DL (ref 74–99)
HCO3 BLDA-SCNC: 23.8 MMOL/L (ref 22–26)
HCO3 BLDA-SCNC: 23.8 MMOL/L (ref 22–26)
HCO3 BLDA-SCNC: 24.9 MMOL/L (ref 22–26)
HCO3 BLDA-SCNC: 25.4 MMOL/L (ref 22–26)
HCO3 BLDA-SCNC: 25.4 MMOL/L (ref 22–26)
HCO3 BLDA-SCNC: 26 MMOL/L (ref 22–26)
HCO3 BLDA-SCNC: 26 MMOL/L (ref 22–26)
HCO3 BLDA-SCNC: 27.7 MMOL/L (ref 22–26)
HCO3 BLDA-SCNC: 27.9 MMOL/L (ref 22–26)
HCO3 BLDMV-SCNC: 27.9 MMOL/L (ref 22–26)
HCO3 BLDV-SCNC: 28.3 MMOL/L (ref 22–26)
HCT VFR BLD AUTO: 47.2 % (ref 41–52)
HCT VFR BLD EST: 41 % (ref 41–52)
HCT VFR BLD EST: 42 % (ref 41–52)
HCT VFR BLD EST: 42 % (ref 41–52)
HCT VFR BLD EST: 43 % (ref 41–52)
HCT VFR BLD EST: 43 % (ref 41–52)
HCT VFR BLD EST: 47 % (ref 41–52)
HCT VFR BLD EST: 50 % (ref 41–52)
HCT VFR BLD EST: 51 % (ref 41–52)
HCT VFR BLD EST: 53 % (ref 41–52)
HCT VFR BLD EST: 53 % (ref 41–52)
HGB BLD-MCNC: 15.9 G/DL (ref 13.5–17.5)
HGB BLDA-MCNC: 13.9 G/DL (ref 13.5–17.5)
HGB BLDA-MCNC: 13.9 G/DL (ref 13.5–17.5)
HGB BLDA-MCNC: 14.1 G/DL (ref 13.5–17.5)
HGB BLDA-MCNC: 14.1 G/DL (ref 13.5–17.5)
HGB BLDA-MCNC: 14.2 G/DL (ref 13.5–17.5)
HGB BLDA-MCNC: 14.2 G/DL (ref 13.5–17.5)
HGB BLDA-MCNC: 14.4 G/DL (ref 13.5–17.5)
HGB BLDA-MCNC: 14.4 G/DL (ref 13.5–17.5)
HGB BLDA-MCNC: 15.5 G/DL (ref 13.5–17.5)
HGB BLDA-MCNC: 15.5 G/DL (ref 13.5–17.5)
HGB BLDA-MCNC: 16.7 G/DL (ref 13.5–17.5)
HGB BLDA-MCNC: 17.6 G/DL (ref 13.5–17.5)
HGB BLDA-MCNC: 17.6 G/DL (ref 13.5–17.5)
HGB BLDA-MCNC: 17.7 G/DL (ref 13.5–17.5)
HGB BLDA-MCNC: 17.7 G/DL (ref 13.5–17.5)
HGB BLDMV-MCNC: 17.1 G/DL (ref 13.5–17.5)
HGB BLDV-MCNC: 13.8 G/DL (ref 13.5–17.5)
INHALED O2 CONCENTRATION: 100 %
INHALED O2 CONCENTRATION: 21 %
INHALED O2 CONCENTRATION: 21 %
INHALED O2 CONCENTRATION: 80 %
INHALED O2 CONCENTRATION: 90 %
INR PPP: 1.1 (ref 0.9–1.1)
LACTATE BLDA-SCNC: 1.3 MMOL/L (ref 0.4–2)
LACTATE BLDA-SCNC: 1.4 MMOL/L (ref 0.4–2)
LACTATE BLDA-SCNC: 1.5 MMOL/L (ref 0.4–2)
LACTATE BLDA-SCNC: 1.8 MMOL/L (ref 0.4–2)
LACTATE BLDA-SCNC: 1.9 MMOL/L (ref 0.4–2)
LACTATE BLDA-SCNC: 1.9 MMOL/L (ref 0.4–2)
LACTATE BLDMV-SCNC: 1.7 MMOL/L (ref 0.4–2)
LACTATE BLDV-SCNC: 1.8 MMOL/L (ref 0.4–2)
MAGNESIUM SERPL-MCNC: 3.93 MG/DL (ref 1.6–2.4)
MCH RBC QN AUTO: 27.8 PG (ref 26–34)
MCHC RBC AUTO-ENTMCNC: 33.7 G/DL (ref 32–36)
MCV RBC AUTO: 83 FL (ref 80–100)
METHGB MFR BLDA: 0.9 % (ref 0–1.5)
METHGB MFR BLDA: 0.9 % (ref 0–1.5)
METHGB MFR BLDA: 1 % (ref 0–1.5)
METHGB MFR BLDA: 1.2 % (ref 0–1.5)
METHGB MFR BLDA: 1.2 % (ref 0–1.5)
NRBC BLD-RTO: 0 /100 WBCS (ref 0–0)
OXYHGB MFR BLDA: 96.5 % (ref 94–98)
OXYHGB MFR BLDA: 96.5 % (ref 94–98)
OXYHGB MFR BLDA: 96.6 % (ref 94–98)
OXYHGB MFR BLDA: 96.6 % (ref 94–98)
OXYHGB MFR BLDA: 97.2 % (ref 94–98)
OXYHGB MFR BLDA: 97.2 % (ref 94–98)
OXYHGB MFR BLDA: 97.4 % (ref 94–98)
OXYHGB MFR BLDA: 97.4 % (ref 94–98)
OXYHGB MFR BLDA: 97.7 % (ref 94–98)
OXYHGB MFR BLDA: 97.7 % (ref 94–98)
OXYHGB MFR BLDA: 97.8 % (ref 94–98)
OXYHGB MFR BLDA: 97.9 % (ref 94–98)
OXYHGB MFR BLDA: 97.9 % (ref 94–98)
OXYHGB MFR BLDMV: 85.3 % (ref 45–75)
OXYHGB MFR BLDV: 87.1 % (ref 45–75)
P AXIS: 55 DEGREES
P OFFSET: 183 MS
P ONSET: 110 MS
PCO2 BLDA: 40 MM HG (ref 38–42)
PCO2 BLDA: 43 MM HG (ref 38–42)
PCO2 BLDA: 43 MM HG (ref 38–42)
PCO2 BLDA: 44 MM HG (ref 38–42)
PCO2 BLDA: 45 MM HG (ref 38–42)
PCO2 BLDA: 53 MM HG (ref 38–42)
PCO2 BLDA: 59 MM HG (ref 38–42)
PCO2 BLDMV: 58 MM HG (ref 41–51)
PCO2 BLDV: 63 MM HG (ref 41–51)
PH BLDA: 7.26 PH (ref 7.38–7.42)
PH BLDA: 7.26 PH (ref 7.38–7.42)
PH BLDA: 7.28 PH (ref 7.38–7.42)
PH BLDA: 7.28 PH (ref 7.38–7.42)
PH BLDA: 7.38 PH (ref 7.38–7.42)
PH BLDA: 7.38 PH (ref 7.38–7.42)
PH BLDA: 7.39 PH (ref 7.38–7.42)
PH BLDA: 7.4 PH (ref 7.38–7.42)
PH BLDA: 7.41 PH (ref 7.38–7.42)
PH BLDMV: 7.29 PH (ref 7.33–7.43)
PH BLDV: 7.26 PH (ref 7.33–7.43)
PHOSPHATE SERPL-MCNC: 4.7 MG/DL (ref 2.5–4.9)
PLATELET # BLD AUTO: 168 X10*3/UL (ref 150–450)
PO2 BLDA: 100 MM HG (ref 85–95)
PO2 BLDA: 103 MM HG (ref 85–95)
PO2 BLDA: 216 MM HG (ref 85–95)
PO2 BLDA: 216 MM HG (ref 85–95)
PO2 BLDA: 246 MM HG (ref 85–95)
PO2 BLDA: 308 MM HG (ref 85–95)
PO2 BLDA: 358 MM HG (ref 85–95)
PO2 BLDA: 396 MM HG (ref 85–95)
PO2 BLDA: 400 MM HG (ref 85–95)
PO2 BLDMV: 58 MM HG (ref 35–45)
PO2 BLDV: 63 MM HG (ref 35–45)
POTASSIUM BLDA-SCNC: 4.4 MMOL/L (ref 3.5–5.3)
POTASSIUM BLDA-SCNC: 4.6 MMOL/L (ref 3.5–5.3)
POTASSIUM BLDA-SCNC: 4.8 MMOL/L (ref 3.5–5.3)
POTASSIUM BLDA-SCNC: 5 MMOL/L (ref 3.5–5.3)
POTASSIUM BLDA-SCNC: 5.1 MMOL/L (ref 3.5–5.3)
POTASSIUM BLDA-SCNC: 5.5 MMOL/L (ref 3.5–5.3)
POTASSIUM BLDMV-SCNC: 4.4 MMOL/L (ref 3.5–5.3)
POTASSIUM BLDV-SCNC: 5.2 MMOL/L (ref 3.5–5.3)
POTASSIUM SERPL-SCNC: 4.6 MMOL/L (ref 3.5–5.3)
POTASSIUM SERPL-SCNC: 4.6 MMOL/L (ref 3.5–5.3)
PR INTERVAL: 194 MS
PRODUCT BLOOD TYPE: 5100
PRODUCT BLOOD TYPE: 5100
PRODUCT CODE: NORMAL
PRODUCT CODE: NORMAL
PROTHROMBIN TIME: 12 SECONDS (ref 9.8–12.8)
Q ONSET: 207 MS
QRS COUNT: 13 BEATS
QRS DURATION: 112 MS
QT INTERVAL: 440 MS
QTC CALCULATION(BAZETT): 501 MS
QTC FREDERICIA: 480 MS
R AXIS: 50 DEGREES
RBC # BLD AUTO: 5.71 X10*6/UL (ref 4.5–5.9)
RH FACTOR (ANTIGEN D): NORMAL
SAO2 % BLDA: 100 % (ref 94–100)
SAO2 % BLDA: 99 % (ref 94–100)
SAO2 % BLDA: 99 % (ref 94–100)
SAO2 % BLDMV: 88 % (ref 45–75)
SAO2 % BLDV: 89 % (ref 45–75)
SODIUM BLDA-SCNC: 130 MMOL/L (ref 136–145)
SODIUM BLDA-SCNC: 130 MMOL/L (ref 136–145)
SODIUM BLDA-SCNC: 131 MMOL/L (ref 136–145)
SODIUM BLDA-SCNC: 132 MMOL/L (ref 136–145)
SODIUM BLDA-SCNC: 133 MMOL/L (ref 136–145)
SODIUM BLDA-SCNC: 134 MMOL/L (ref 136–145)
SODIUM BLDMV-SCNC: 134 MMOL/L (ref 136–145)
SODIUM BLDV-SCNC: 131 MMOL/L (ref 136–145)
SODIUM SERPL-SCNC: 137 MMOL/L (ref 136–145)
SODIUM SERPL-SCNC: 137 MMOL/L (ref 136–145)
T AXIS: 38 DEGREES
T OFFSET: 427 MS
UNIT ABO: NORMAL
UNIT ABO: NORMAL
UNIT NUMBER: NORMAL
UNIT NUMBER: NORMAL
UNIT RH: NORMAL
UNIT RH: NORMAL
UNIT VOLUME: 350
UNIT VOLUME: 350
VENTRICULAR RATE: 78 BPM
WBC # BLD AUTO: 22.4 X10*3/UL (ref 4.4–11.3)
XM INTEP: NORMAL
XM INTEP: NORMAL

## 2024-06-28 PROCEDURE — 84132 ASSAY OF SERUM POTASSIUM: CPT

## 2024-06-28 PROCEDURE — 33508 ENDOSCOPIC VEIN HARVEST: CPT | Performed by: THORACIC SURGERY (CARDIOTHORACIC VASCULAR SURGERY)

## 2024-06-28 PROCEDURE — 82947 ASSAY GLUCOSE BLOOD QUANT: CPT

## 2024-06-28 PROCEDURE — 3600000012 HC PERFUSION TIME - EACH INCREMENTAL 1 MINUTE: Performed by: THORACIC SURGERY (CARDIOTHORACIC VASCULAR SURGERY)

## 2024-06-28 PROCEDURE — 2500000005 HC RX 250 GENERAL PHARMACY W/O HCPCS: Performed by: NURSE ANESTHETIST, CERTIFIED REGISTERED

## 2024-06-28 PROCEDURE — 83605 ASSAY OF LACTIC ACID: CPT

## 2024-06-28 PROCEDURE — 2500000004 HC RX 250 GENERAL PHARMACY W/ HCPCS (ALT 636 FOR OP/ED): Mod: JZ | Performed by: STUDENT IN AN ORGANIZED HEALTH CARE EDUCATION/TRAINING PROGRAM

## 2024-06-28 PROCEDURE — 2500000004 HC RX 250 GENERAL PHARMACY W/ HCPCS (ALT 636 FOR OP/ED): Performed by: NURSE PRACTITIONER

## 2024-06-28 PROCEDURE — 85730 THROMBOPLASTIN TIME PARTIAL: CPT | Performed by: NURSE PRACTITIONER

## 2024-06-28 PROCEDURE — C1900 LEAD, CORONARY VENOUS: HCPCS | Performed by: THORACIC SURGERY (CARDIOTHORACIC VASCULAR SURGERY)

## 2024-06-28 PROCEDURE — 85347 COAGULATION TIME ACTIVATED: CPT

## 2024-06-28 PROCEDURE — 3700000002 HC GENERAL ANESTHESIA TIME - EACH INCREMENTAL 1 MINUTE: Performed by: THORACIC SURGERY (CARDIOTHORACIC VASCULAR SURGERY)

## 2024-06-28 PROCEDURE — 5A1221Z PERFORMANCE OF CARDIAC OUTPUT, CONTINUOUS: ICD-10-PCS | Performed by: THORACIC SURGERY (CARDIOTHORACIC VASCULAR SURGERY)

## 2024-06-28 PROCEDURE — 94002 VENT MGMT INPAT INIT DAY: CPT

## 2024-06-28 PROCEDURE — 82375 ASSAY CARBOXYHB QUANT: CPT

## 2024-06-28 PROCEDURE — 71045 X-RAY EXAM CHEST 1 VIEW: CPT

## 2024-06-28 PROCEDURE — 2500000004 HC RX 250 GENERAL PHARMACY W/ HCPCS (ALT 636 FOR OP/ED): Performed by: THORACIC SURGERY (CARDIOTHORACIC VASCULAR SURGERY)

## 2024-06-28 PROCEDURE — 37799 UNLISTED PX VASCULAR SURGERY: CPT | Performed by: NURSE PRACTITIONER

## 2024-06-28 PROCEDURE — 93010 ELECTROCARDIOGRAM REPORT: CPT | Performed by: INTERNAL MEDICINE

## 2024-06-28 PROCEDURE — 2780000003 HC OR 278 NO HCPCS: Performed by: THORACIC SURGERY (CARDIOTHORACIC VASCULAR SURGERY)

## 2024-06-28 PROCEDURE — 2500000005 HC RX 250 GENERAL PHARMACY W/O HCPCS: Performed by: NURSE PRACTITIONER

## 2024-06-28 PROCEDURE — 2500000001 HC RX 250 WO HCPCS SELF ADMINISTERED DRUGS (ALT 637 FOR MEDICARE OP): Performed by: NURSE PRACTITIONER

## 2024-06-28 PROCEDURE — 99291 CRITICAL CARE FIRST HOUR: CPT

## 2024-06-28 PROCEDURE — 02110Z9 BYPASS CORONARY ARTERY, TWO ARTERIES FROM LEFT INTERNAL MAMMARY, OPEN APPROACH: ICD-10-PCS | Performed by: THORACIC SURGERY (CARDIOTHORACIC VASCULAR SURGERY)

## 2024-06-28 PROCEDURE — S0017 INJECTION, AMINOCAPROIC ACID: HCPCS | Performed by: ANESTHESIOLOGY

## 2024-06-28 PROCEDURE — 06BP4ZZ EXCISION OF RIGHT SAPHENOUS VEIN, PERCUTANEOUS ENDOSCOPIC APPROACH: ICD-10-PCS | Performed by: THORACIC SURGERY (CARDIOTHORACIC VASCULAR SURGERY)

## 2024-06-28 PROCEDURE — 021009W BYPASS CORONARY ARTERY, ONE ARTERY FROM AORTA WITH AUTOLOGOUS VENOUS TISSUE, OPEN APPROACH: ICD-10-PCS | Performed by: THORACIC SURGERY (CARDIOTHORACIC VASCULAR SURGERY)

## 2024-06-28 PROCEDURE — 33534 CABG ARTERIAL TWO: CPT | Performed by: THORACIC SURGERY (CARDIOTHORACIC VASCULAR SURGERY)

## 2024-06-28 PROCEDURE — A4649 SURGICAL SUPPLIES: HCPCS | Performed by: THORACIC SURGERY (CARDIOTHORACIC VASCULAR SURGERY)

## 2024-06-28 PROCEDURE — 84132 ASSAY OF SERUM POTASSIUM: CPT | Performed by: NURSE PRACTITIONER

## 2024-06-28 PROCEDURE — 2500000004 HC RX 250 GENERAL PHARMACY W/ HCPCS (ALT 636 FOR OP/ED)

## 2024-06-28 PROCEDURE — 3600000011 HC PERFUSION TIME - INITIAL BASE CHARGE: Performed by: THORACIC SURGERY (CARDIOTHORACIC VASCULAR SURGERY)

## 2024-06-28 PROCEDURE — 33517 CABG ARTERY-VEIN SINGLE: CPT | Performed by: THORACIC SURGERY (CARDIOTHORACIC VASCULAR SURGERY)

## 2024-06-28 PROCEDURE — S0017 INJECTION, AMINOCAPROIC ACID: HCPCS | Performed by: NURSE ANESTHETIST, CERTIFIED REGISTERED

## 2024-06-28 PROCEDURE — 2500000005 HC RX 250 GENERAL PHARMACY W/O HCPCS: Performed by: THORACIC SURGERY (CARDIOTHORACIC VASCULAR SURGERY)

## 2024-06-28 PROCEDURE — 3600000017 HC OR TIME - EACH INCREMENTAL 1 MINUTE - PROCEDURE LEVEL SIX: Performed by: THORACIC SURGERY (CARDIOTHORACIC VASCULAR SURGERY)

## 2024-06-28 PROCEDURE — 82805 BLOOD GASES W/O2 SATURATION: CPT | Performed by: NURSE PRACTITIONER

## 2024-06-28 PROCEDURE — 84132 ASSAY OF SERUM POTASSIUM: CPT | Performed by: THORACIC SURGERY (CARDIOTHORACIC VASCULAR SURGERY)

## 2024-06-28 PROCEDURE — 2720000007 HC OR 272 NO HCPCS: Performed by: THORACIC SURGERY (CARDIOTHORACIC VASCULAR SURGERY)

## 2024-06-28 PROCEDURE — 2500000004 HC RX 250 GENERAL PHARMACY W/ HCPCS (ALT 636 FOR OP/ED): Performed by: ANESTHESIOLOGY

## 2024-06-28 PROCEDURE — 83735 ASSAY OF MAGNESIUM: CPT | Performed by: NURSE PRACTITIONER

## 2024-06-28 PROCEDURE — 93005 ELECTROCARDIOGRAM TRACING: CPT

## 2024-06-28 PROCEDURE — 71045 X-RAY EXAM CHEST 1 VIEW: CPT | Performed by: RADIOLOGY

## 2024-06-28 PROCEDURE — 2500000004 HC RX 250 GENERAL PHARMACY W/ HCPCS (ALT 636 FOR OP/ED): Mod: JZ | Performed by: NURSE PRACTITIONER

## 2024-06-28 PROCEDURE — 2500000004 HC RX 250 GENERAL PHARMACY W/ HCPCS (ALT 636 FOR OP/ED): Mod: JZ | Performed by: NURSE ANESTHETIST, CERTIFIED REGISTERED

## 2024-06-28 PROCEDURE — 85384 FIBRINOGEN ACTIVITY: CPT | Performed by: NURSE PRACTITIONER

## 2024-06-28 PROCEDURE — 3700000001 HC GENERAL ANESTHESIA TIME - INITIAL BASE CHARGE: Performed by: THORACIC SURGERY (CARDIOTHORACIC VASCULAR SURGERY)

## 2024-06-28 PROCEDURE — 2020000001 HC ICU ROOM DAILY

## 2024-06-28 PROCEDURE — 85027 COMPLETE CBC AUTOMATED: CPT | Performed by: NURSE PRACTITIONER

## 2024-06-28 PROCEDURE — 2500000002 HC RX 250 W HCPCS SELF ADMINISTERED DRUGS (ALT 637 FOR MEDICARE OP, ALT 636 FOR OP/ED): Performed by: NURSE ANESTHETIST, CERTIFIED REGISTERED

## 2024-06-28 PROCEDURE — 36415 COLL VENOUS BLD VENIPUNCTURE: CPT | Performed by: THORACIC SURGERY (CARDIOTHORACIC VASCULAR SURGERY)

## 2024-06-28 PROCEDURE — P9045 ALBUMIN (HUMAN), 5%, 250 ML: HCPCS | Mod: JZ | Performed by: NURSE PRACTITIONER

## 2024-06-28 PROCEDURE — 2500000005 HC RX 250 GENERAL PHARMACY W/O HCPCS: Performed by: STUDENT IN AN ORGANIZED HEALTH CARE EDUCATION/TRAINING PROGRAM

## 2024-06-28 PROCEDURE — 3600000018 HC OR TIME - INITIAL BASE CHARGE - PROCEDURE LEVEL SIX: Performed by: THORACIC SURGERY (CARDIOTHORACIC VASCULAR SURGERY)

## 2024-06-28 DEVICE — FOGARTY - HYDRAGRIP SURGICAL - CLAMP INSERTS
Type: IMPLANTABLE DEVICE | Site: HEART | Status: NON-FUNCTIONAL
Brand: FOGARTY SOFTJAW

## 2024-06-28 DEVICE — PLATE KIT, AUXILIARYCABLE, SHARP NEEDLE, XP RIGID FIXATION: Type: IMPLANTABLE DEVICE | Site: HEART | Status: FUNCTIONAL

## 2024-06-28 DEVICE — PLATE, X CABLE, XP RIGID FIXATION: Type: IMPLANTABLE DEVICE | Site: HEART | Status: FUNCTIONAL

## 2024-06-28 DEVICE — PLATE KIT, BOX CABLE, XP RIGID FIXATION: Type: IMPLANTABLE DEVICE | Site: HEART | Status: FUNCTIONAL

## 2024-06-28 RX ORDER — CEFAZOLIN SODIUM 2 G/100ML
2 INJECTION, SOLUTION INTRAVENOUS ONCE
Status: COMPLETED | OUTPATIENT
Start: 2024-06-28 | End: 2024-06-28

## 2024-06-28 RX ORDER — DOCUSATE SODIUM 100 MG/1
100 CAPSULE, LIQUID FILLED ORAL 3 TIMES DAILY
Status: DISCONTINUED | OUTPATIENT
Start: 2024-06-28 | End: 2024-07-04 | Stop reason: HOSPADM

## 2024-06-28 RX ORDER — INSULIN LISPRO 100 [IU]/ML
0-15 INJECTION, SOLUTION INTRAVENOUS; SUBCUTANEOUS EVERY 4 HOURS
Status: DISCONTINUED | OUTPATIENT
Start: 2024-06-28 | End: 2024-06-29

## 2024-06-28 RX ORDER — METOCLOPRAMIDE HYDROCHLORIDE 5 MG/ML
10 INJECTION INTRAMUSCULAR; INTRAVENOUS EVERY 6 HOURS PRN
Status: DISCONTINUED | OUTPATIENT
Start: 2024-06-28 | End: 2024-07-04 | Stop reason: HOSPADM

## 2024-06-28 RX ORDER — SODIUM CHLORIDE, SODIUM LACTATE, POTASSIUM CHLORIDE, CALCIUM CHLORIDE 600; 310; 30; 20 MG/100ML; MG/100ML; MG/100ML; MG/100ML
20 INJECTION, SOLUTION INTRAVENOUS CONTINUOUS
Status: DISCONTINUED | OUTPATIENT
Start: 2024-06-28 | End: 2024-06-28

## 2024-06-28 RX ORDER — HEPARIN SODIUM 1000 [USP'U]/ML
45000 INJECTION, SOLUTION INTRAVENOUS; SUBCUTANEOUS ONCE
Status: COMPLETED | OUTPATIENT
Start: 2024-06-28 | End: 2024-06-28

## 2024-06-28 RX ORDER — ASPIRIN 81 MG/1
81 TABLET ORAL DAILY
Status: DISCONTINUED | OUTPATIENT
Start: 2024-06-28 | End: 2024-07-04 | Stop reason: HOSPADM

## 2024-06-28 RX ORDER — MAGNESIUM SULFATE HEPTAHYDRATE 500 MG/ML
INJECTION, SOLUTION INTRAMUSCULAR; INTRAVENOUS AS NEEDED
Status: DISCONTINUED | OUTPATIENT
Start: 2024-06-28 | End: 2024-06-28

## 2024-06-28 RX ORDER — NALOXONE HYDROCHLORIDE 0.4 MG/ML
0.2 INJECTION, SOLUTION INTRAMUSCULAR; INTRAVENOUS; SUBCUTANEOUS EVERY 5 MIN PRN
Status: DISCONTINUED | OUTPATIENT
Start: 2024-06-28 | End: 2024-07-02 | Stop reason: ALTCHOICE

## 2024-06-28 RX ORDER — METOCLOPRAMIDE 10 MG/1
10 TABLET ORAL EVERY 6 HOURS PRN
Status: DISCONTINUED | OUTPATIENT
Start: 2024-06-28 | End: 2024-07-04 | Stop reason: HOSPADM

## 2024-06-28 RX ORDER — ACETAMINOPHEN 325 MG/1
650 TABLET ORAL EVERY 6 HOURS
Status: DISCONTINUED | OUTPATIENT
Start: 2024-06-28 | End: 2024-06-29

## 2024-06-28 RX ORDER — HYDROMORPHONE HYDROCHLORIDE 1 MG/ML
0.5 INJECTION, SOLUTION INTRAMUSCULAR; INTRAVENOUS; SUBCUTANEOUS
Status: DISCONTINUED | OUTPATIENT
Start: 2024-06-28 | End: 2024-06-29

## 2024-06-28 RX ORDER — ONDANSETRON HYDROCHLORIDE 2 MG/ML
4 INJECTION, SOLUTION INTRAVENOUS EVERY 8 HOURS PRN
Status: DISCONTINUED | OUTPATIENT
Start: 2024-06-28 | End: 2024-07-04 | Stop reason: HOSPADM

## 2024-06-28 RX ORDER — PANTOPRAZOLE SODIUM 40 MG/10ML
40 INJECTION, POWDER, LYOPHILIZED, FOR SOLUTION INTRAVENOUS
Status: DISCONTINUED | OUTPATIENT
Start: 2024-06-29 | End: 2024-06-29

## 2024-06-28 RX ORDER — POTASSIUM CHLORIDE 14.9 MG/ML
20 INJECTION INTRAVENOUS EVERY 6 HOURS PRN
Status: DISCONTINUED | OUTPATIENT
Start: 2024-06-28 | End: 2024-06-29

## 2024-06-28 RX ORDER — EPINEPHRINE HCL IN 0.9 % NACL 4MG/250ML
PLASTIC BAG, INJECTION (ML) INTRAVENOUS CONTINUOUS PRN
Status: DISCONTINUED | OUTPATIENT
Start: 2024-06-28 | End: 2024-06-28

## 2024-06-28 RX ORDER — SODIUM CHLORIDE, SODIUM LACTATE, POTASSIUM CHLORIDE, CALCIUM CHLORIDE 600; 310; 30; 20 MG/100ML; MG/100ML; MG/100ML; MG/100ML
50 INJECTION, SOLUTION INTRAVENOUS CONTINUOUS
Status: DISCONTINUED | OUTPATIENT
Start: 2024-06-28 | End: 2024-06-29

## 2024-06-28 RX ORDER — OXYCODONE HYDROCHLORIDE 5 MG/1
10 TABLET ORAL EVERY 4 HOURS PRN
Status: DISCONTINUED | OUTPATIENT
Start: 2024-06-28 | End: 2024-07-01

## 2024-06-28 RX ORDER — LEVOTHYROXINE SODIUM 50 UG/1
50 TABLET ORAL
Status: DISCONTINUED | OUTPATIENT
Start: 2024-06-28 | End: 2024-07-04 | Stop reason: HOSPADM

## 2024-06-28 RX ORDER — PROPOFOL 10 MG/ML
INJECTION, EMULSION INTRAVENOUS AS NEEDED
Status: DISCONTINUED | OUTPATIENT
Start: 2024-06-28 | End: 2024-06-28

## 2024-06-28 RX ORDER — PROPOFOL 10 MG/ML
0-50 INJECTION, EMULSION INTRAVENOUS CONTINUOUS
Status: DISCONTINUED | OUTPATIENT
Start: 2024-06-28 | End: 2024-06-28

## 2024-06-28 RX ORDER — CEFAZOLIN SODIUM 2 G/100ML
2 INJECTION, SOLUTION INTRAVENOUS EVERY 8 HOURS
Status: COMPLETED | OUTPATIENT
Start: 2024-06-28 | End: 2024-06-30

## 2024-06-28 RX ORDER — NITROGLYCERIN 20 MG/100ML
5-200 INJECTION INTRAVENOUS CONTINUOUS
Status: DISCONTINUED | OUTPATIENT
Start: 2024-06-28 | End: 2024-06-29

## 2024-06-28 RX ORDER — PROPOFOL 10 MG/ML
INJECTION, EMULSION INTRAVENOUS CONTINUOUS PRN
Status: DISCONTINUED | OUTPATIENT
Start: 2024-06-28 | End: 2024-06-28

## 2024-06-28 RX ORDER — ETOMIDATE 2 MG/ML
INJECTION INTRAVENOUS AS NEEDED
Status: DISCONTINUED | OUTPATIENT
Start: 2024-06-28 | End: 2024-06-28

## 2024-06-28 RX ORDER — MAGNESIUM SULFATE HEPTAHYDRATE 40 MG/ML
2 INJECTION, SOLUTION INTRAVENOUS EVERY 6 HOURS PRN
Status: DISCONTINUED | OUTPATIENT
Start: 2024-06-28 | End: 2024-07-04 | Stop reason: HOSPADM

## 2024-06-28 RX ORDER — CISATRACURIUM BESYLATE 2 MG/ML
INJECTION, SOLUTION INTRAVENOUS AS NEEDED
Status: DISCONTINUED | OUTPATIENT
Start: 2024-06-28 | End: 2024-06-28

## 2024-06-28 RX ORDER — DEXTROSE 50 % IN WATER (D50W) INTRAVENOUS SYRINGE
25
Status: DISCONTINUED | OUTPATIENT
Start: 2024-06-28 | End: 2024-07-04 | Stop reason: HOSPADM

## 2024-06-28 RX ORDER — ALBUMIN HUMAN 50 G/1000ML
25 SOLUTION INTRAVENOUS ONCE
Status: COMPLETED | OUTPATIENT
Start: 2024-06-28 | End: 2024-06-28

## 2024-06-28 RX ORDER — FENTANYL CITRATE 50 UG/ML
INJECTION, SOLUTION INTRAMUSCULAR; INTRAVENOUS AS NEEDED
Status: DISCONTINUED | OUTPATIENT
Start: 2024-06-28 | End: 2024-06-28

## 2024-06-28 RX ORDER — POLYETHYLENE GLYCOL 3350 17 G/17G
17 POWDER, FOR SOLUTION ORAL 2 TIMES DAILY
Status: DISCONTINUED | OUTPATIENT
Start: 2024-06-28 | End: 2024-07-04 | Stop reason: HOSPADM

## 2024-06-28 RX ORDER — MAGNESIUM HYDROXIDE 2400 MG/10ML
10 SUSPENSION ORAL DAILY PRN
Status: DISCONTINUED | OUTPATIENT
Start: 2024-06-28 | End: 2024-07-04 | Stop reason: HOSPADM

## 2024-06-28 RX ORDER — CEFAZOLIN SODIUM 2 G/100ML
2 INJECTION, SOLUTION INTRAVENOUS
Status: DISCONTINUED | OUTPATIENT
Start: 2024-06-28 | End: 2024-06-28 | Stop reason: HOSPADM

## 2024-06-28 RX ORDER — MAGNESIUM SULFATE HEPTAHYDRATE 40 MG/ML
4 INJECTION, SOLUTION INTRAVENOUS EVERY 6 HOURS PRN
Status: DISCONTINUED | OUTPATIENT
Start: 2024-06-28 | End: 2024-07-04 | Stop reason: HOSPADM

## 2024-06-28 RX ORDER — NITROGLYCERIN 40 MG/100ML
INJECTION INTRAVENOUS AS NEEDED
Status: DISCONTINUED | OUTPATIENT
Start: 2024-06-28 | End: 2024-06-28

## 2024-06-28 RX ORDER — PANTOPRAZOLE SODIUM 40 MG/1
40 TABLET, DELAYED RELEASE ORAL
Status: DISCONTINUED | OUTPATIENT
Start: 2024-06-29 | End: 2024-06-29

## 2024-06-28 RX ORDER — HEPARIN SODIUM 1000 [USP'U]/ML
INJECTION, SOLUTION INTRAVENOUS; SUBCUTANEOUS AS NEEDED
Status: DISCONTINUED | OUTPATIENT
Start: 2024-06-28 | End: 2024-06-28

## 2024-06-28 RX ORDER — SODIUM CHLORIDE 9 MG/ML
INJECTION, SOLUTION INTRAVENOUS CONTINUOUS PRN
Status: DISCONTINUED | OUTPATIENT
Start: 2024-06-28 | End: 2024-06-28

## 2024-06-28 RX ORDER — GLYCOPYRROLATE 0.2 MG/ML
INJECTION INTRAMUSCULAR; INTRAVENOUS AS NEEDED
Status: DISCONTINUED | OUTPATIENT
Start: 2024-06-28 | End: 2024-06-28

## 2024-06-28 RX ORDER — SODIUM CHLORIDE, SODIUM LACTATE, POTASSIUM CHLORIDE, CALCIUM CHLORIDE 600; 310; 30; 20 MG/100ML; MG/100ML; MG/100ML; MG/100ML
INJECTION, SOLUTION INTRAVENOUS CONTINUOUS PRN
Status: DISCONTINUED | OUTPATIENT
Start: 2024-06-28 | End: 2024-06-28

## 2024-06-28 RX ORDER — ONDANSETRON 4 MG/1
4 TABLET, ORALLY DISINTEGRATING ORAL EVERY 8 HOURS PRN
Status: DISCONTINUED | OUTPATIENT
Start: 2024-06-28 | End: 2024-07-04 | Stop reason: HOSPADM

## 2024-06-28 RX ORDER — ASPIRIN 300 MG/1
150 SUPPOSITORY RECTAL ONCE
Status: COMPLETED | OUTPATIENT
Start: 2024-06-28 | End: 2024-06-28

## 2024-06-28 RX ORDER — POTASSIUM CHLORIDE 29.8 MG/ML
40 INJECTION INTRAVENOUS EVERY 6 HOURS PRN
Status: DISCONTINUED | OUTPATIENT
Start: 2024-06-28 | End: 2024-06-29

## 2024-06-28 RX ORDER — AMINOCAPROIC ACID 250 MG/ML
INJECTION, SOLUTION INTRAVENOUS AS NEEDED
Status: DISCONTINUED | OUTPATIENT
Start: 2024-06-28 | End: 2024-06-28

## 2024-06-28 RX ORDER — MUPIROCIN 20 MG/G
0.5 OINTMENT TOPICAL 2 TIMES DAILY
Status: COMPLETED | OUTPATIENT
Start: 2024-06-28 | End: 2024-07-02

## 2024-06-28 RX ORDER — CALCIUM CHLORIDE INJECTION 100 MG/ML
INJECTION, SOLUTION INTRAVENOUS AS NEEDED
Status: DISCONTINUED | OUTPATIENT
Start: 2024-06-28 | End: 2024-06-28

## 2024-06-28 RX ORDER — OXYCODONE HYDROCHLORIDE 5 MG/1
5 TABLET ORAL EVERY 4 HOURS PRN
Status: DISCONTINUED | OUTPATIENT
Start: 2024-06-28 | End: 2024-07-04 | Stop reason: HOSPADM

## 2024-06-28 RX ORDER — DEXTROSE 50 % IN WATER (D50W) INTRAVENOUS SYRINGE
25
Status: DISCONTINUED | OUTPATIENT
Start: 2024-06-28 | End: 2024-06-28 | Stop reason: ALTCHOICE

## 2024-06-28 RX ORDER — PROTAMINE SULFATE 10 MG/ML
INJECTION, SOLUTION INTRAVENOUS AS NEEDED
Status: DISCONTINUED | OUTPATIENT
Start: 2024-06-28 | End: 2024-06-28

## 2024-06-28 RX ORDER — LIDOCAINE HYDROCHLORIDE 10 MG/ML
INJECTION INFILTRATION; PERINEURAL AS NEEDED
Status: DISCONTINUED | OUTPATIENT
Start: 2024-06-28 | End: 2024-06-28

## 2024-06-28 RX ORDER — SODIUM CHLORIDE 0.9 G/100ML
IRRIGANT IRRIGATION AS NEEDED
Status: DISCONTINUED | OUTPATIENT
Start: 2024-06-28 | End: 2024-06-28 | Stop reason: HOSPADM

## 2024-06-28 RX ORDER — MUPIROCIN 20 MG/G
OINTMENT TOPICAL 2 TIMES DAILY
Status: DISCONTINUED | OUTPATIENT
Start: 2024-06-28 | End: 2024-06-28

## 2024-06-28 RX ORDER — NOREPINEPHRINE BITARTRATE/D5W 8 MG/250ML
0-1 PLASTIC BAG, INJECTION (ML) INTRAVENOUS CONTINUOUS
Status: DISCONTINUED | OUTPATIENT
Start: 2024-06-28 | End: 2024-06-29

## 2024-06-28 RX ORDER — ATORVASTATIN CALCIUM 20 MG/1
40 TABLET, FILM COATED ORAL NIGHTLY
Status: DISCONTINUED | OUTPATIENT
Start: 2024-06-28 | End: 2024-07-04 | Stop reason: HOSPADM

## 2024-06-28 RX ORDER — NICARDIPINE HYDROCHLORIDE 0.2 MG/ML
2.5-15 INJECTION INTRAVENOUS CONTINUOUS
Status: DISCONTINUED | OUTPATIENT
Start: 2024-06-28 | End: 2024-06-29

## 2024-06-28 RX ORDER — VANCOMYCIN HYDROCHLORIDE 1 G/20ML
INJECTION, POWDER, LYOPHILIZED, FOR SOLUTION INTRAVENOUS AS NEEDED
Status: DISCONTINUED | OUTPATIENT
Start: 2024-06-28 | End: 2024-06-28 | Stop reason: HOSPADM

## 2024-06-28 RX ORDER — PHENYLEPHRINE HYDROCHLORIDE 10 MG/ML
INJECTION INTRAVENOUS AS NEEDED
Status: DISCONTINUED | OUTPATIENT
Start: 2024-06-28 | End: 2024-06-28

## 2024-06-28 RX ORDER — BISACODYL 10 MG/1
10 SUPPOSITORY RECTAL DAILY PRN
Status: DISCONTINUED | OUTPATIENT
Start: 2024-06-28 | End: 2024-07-04 | Stop reason: HOSPADM

## 2024-06-28 RX ORDER — PAPAVERINE HYDROCHLORIDE 30 MG/ML
INJECTION INTRAMUSCULAR; INTRAVENOUS AS NEEDED
Status: DISCONTINUED | OUTPATIENT
Start: 2024-06-28 | End: 2024-06-28 | Stop reason: HOSPADM

## 2024-06-28 RX ORDER — MIDAZOLAM HYDROCHLORIDE 1 MG/ML
INJECTION, SOLUTION INTRAMUSCULAR; INTRAVENOUS AS NEEDED
Status: DISCONTINUED | OUTPATIENT
Start: 2024-06-28 | End: 2024-06-28

## 2024-06-28 RX ORDER — CEFAZOLIN SODIUM 2 G/50ML
2 SOLUTION INTRAVENOUS EVERY 8 HOURS
Status: DISCONTINUED | OUTPATIENT
Start: 2024-06-28 | End: 2024-06-28

## 2024-06-28 RX ORDER — NITROGLYCERIN 20 MG/100ML
INJECTION INTRAVENOUS CONTINUOUS PRN
Status: DISCONTINUED | OUTPATIENT
Start: 2024-06-28 | End: 2024-06-28

## 2024-06-28 SDOH — HEALTH STABILITY: MENTAL HEALTH: CURRENT SMOKER: 0

## 2024-06-28 ASSESSMENT — COGNITIVE AND FUNCTIONAL STATUS - GENERAL
MOBILITY SCORE: 17
TOILETING: A LITTLE
PERSONAL GROOMING: A LITTLE
STANDING UP FROM CHAIR USING ARMS: A LITTLE
DAILY ACTIVITIY SCORE: 18
MOVING TO AND FROM BED TO CHAIR: A LITTLE
HELP NEEDED FOR BATHING: A LITTLE
TURNING FROM BACK TO SIDE WHILE IN FLAT BAD: A LITTLE
DRESSING REGULAR LOWER BODY CLOTHING: A LITTLE
CLIMB 3 TO 5 STEPS WITH RAILING: A LOT
EATING MEALS: A LITTLE
WALKING IN HOSPITAL ROOM: A LITTLE
DRESSING REGULAR UPPER BODY CLOTHING: A LITTLE
MOVING FROM LYING ON BACK TO SITTING ON SIDE OF FLAT BED WITH BEDRAILS: A LITTLE

## 2024-06-28 ASSESSMENT — PAIN SCALES - GENERAL
PAINLEVEL_OUTOF10: 5 - MODERATE PAIN
PAINLEVEL_OUTOF10: 0 - NO PAIN
PAINLEVEL_OUTOF10: 0 - NO PAIN
PAIN_LEVEL: 0
PAINLEVEL_OUTOF10: 7
PAINLEVEL_OUTOF10: 0 - NO PAIN

## 2024-06-28 ASSESSMENT — PAIN DESCRIPTION - ORIENTATION: ORIENTATION: MID

## 2024-06-28 ASSESSMENT — PAIN - FUNCTIONAL ASSESSMENT
PAIN_FUNCTIONAL_ASSESSMENT: 0-10

## 2024-06-28 ASSESSMENT — COLUMBIA-SUICIDE SEVERITY RATING SCALE - C-SSRS
6. HAVE YOU EVER DONE ANYTHING, STARTED TO DO ANYTHING, OR PREPARED TO DO ANYTHING TO END YOUR LIFE?: NO
2. HAVE YOU ACTUALLY HAD ANY THOUGHTS OF KILLING YOURSELF?: NO
1. IN THE PAST MONTH, HAVE YOU WISHED YOU WERE DEAD OR WISHED YOU COULD GO TO SLEEP AND NOT WAKE UP?: NO

## 2024-06-28 ASSESSMENT — PAIN DESCRIPTION - LOCATION: LOCATION: CHEST

## 2024-06-28 NOTE — ANESTHESIA POSTPROCEDURE EVALUATION
Patient: Kamar Garcia    Procedure Summary       Date: 06/28/24 Room / Location: [facility] 09 / [facility]    Anesthesia Start: 0752 Anesthesia Stop: 1409    Procedure: Creation Bypass Graft Coronary ArteryX3(LIMA-DIAG-LAD, SVG-OM2), EVH, RENÉE Diagnosis:       Coronary artery disease of native artery of native heart with stable angina pectoris (CMS-HCC)      (Coronary artery disease of native artery of native heart with stable angina pectoris (CMS-HCC) [I25.118])    Surgeons: Hubert Lovell MD Responsible Provider: Eleazar Felipe MD    Anesthesia Type: general ASA Status: 4            Anesthesia Type: general    Vitals Value Taken Time   /80 06/28/24 1409   Temp 36.1 06/28/24 1409   Pulse 76 06/28/24 1407   Resp 13 06/28/24 1407   SpO2 100 % 06/28/24 1407   Vitals shown include unfiled device data.    Anesthesia Post Evaluation    Patient location during evaluation: ICU  Patient participation: complete - patient cannot participate  Level of consciousness: sedated  Pain score: 0  Pain management: adequate  Airway patency: fixed obstruction  Cardiovascular status: acceptable  Respiratory status: ventilator  Hydration status: acceptable  Postoperative Nausea and Vomiting: none  Comments: PA 31/17  CVP 10  380--5-5 vent  Pt on Epi, NTG and Propofol. Tolerated transport well        There were no known notable events for this encounter.

## 2024-06-28 NOTE — BRIEF OP NOTE
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 heart with stable angina pectoris (CMS-HCC) [I25.118]     Procedures  Creation Bypass Graft Coronary ArteryX3(LIMA-DIAG-LAD, SVG-OM2), EVH, RENÉE  26782 - PA CORONARY ARTERY BYP W/VEIN & ARTERY GRAFT 4 VEIN    Median Sternotomy  Central Cannulation  Right Leg EVH  LIMA-Dg-LAD  SVG-OM2    Chest Tubes/Drains: 32 fr Mediastinal                                     28 fr Left Pleural       Temporary Pacing Wires: Ventricular Pacing Wires    -Settings: Back up at 60   -Underlying Rhythm: Sinus    Permanent pacer/ICD: No   -Preoperative settings:    -Intra-op/ Postoperative settings:    Sternotomy performed by:  Dr. Stoney Mccrary    Conduit Harvested by:  Stevie FISHER    Sternal Wires placed by: Stevie FISHER  ( three Nathalie XP plates )    Arm/Leg/Groin Closure/Cutdown performed by:  Right Leg EVH closure - Stevie    Cardio Pulmonary Bypass Time:  118 min  Cross-clamp Time: 95 min  Circulatory Arrest: No Time:     Is patient candidate for Emergency Re-sternotomy? Yes   -If yes, POD #10 is -  24  Surgeons      * Hubert Lovell - Primary    Resident/Fellow/Other Assistant:  Surgeons and Role:     * Cady Mccrary MD - Assisting  Akash Hubbard CSA RNFA  Procedure Summary  Anesthesia: General  ASA: IV  Anesthesia Staff: Anesthesiologist: Bo Rangel DO; Anh Dhillon MD; Eleazar Felipe MD  CRNA: JES Salazar-CRNA  Perfusionist: Sammi Yoo  Estimated Blood Loss: 250mL  Intra-op Medications:   Administrations occurring from 0730 to 1345 on 24:   Medication Name Total Dose   papaverine injection 60 mg   sodium chloride 0.9 % irrigation solution 5,000 mL    vancomycin (Vancocin) vial for injection 4 g   heparin 10,000 Units in sodium chloride 0.9 % 1,000 mL irrigation 2,000 mL   aminocaproic acid (Amicar) infusion 11.04 g   lactated Ringer's infusion 289.5 mL   ceFAZolin in dextrose (iso-os) (Ancef) IVPB 2 g 4 g   heparin 1,000 unit/mL injection 45,000 Units 45,000 Units   vancomycin (Vancocin) in % 5 dextrose 500 mL IV 1,750 mg 1,680 mg              Anesthesia Record               Intraprocedure I/O Totals          Intake    Aminocaproic Acid Drip 0.00 mL    The total shown is the total volume documented since Anesthesia Start was filed.    Insulin Drip 0.00 mL    The total shown is the total volume documented since Anesthesia Start was filed.    Nitroglycerin Drip 0.00 mL    The total shown is the total volume documented since Anesthesia Start was filed.    Epinephrine Drip 0.00 mL    The total shown is the total volume documented since Anesthesia Start was filed.    Propofol Drip 0.00 mL    The total shown is the total volume documented since Anesthesia Start was filed.    LR infusion 401.67 mL    NaCl 0.9 % infusion 700.00 mL    lactated Ringer's infusion 1100.00 mL    ceFAZolin in dextrose (iso-os) (Ancef) IVPB 2 g 200.00 mL    vancomycin (Vancocin) in % 5 dextrose 500 mL IV 1,750 mg 480.00 mL    Cell Saver 777 mL    Total Intake 3658.67 mL       Output    Urine 1195 mL    Est. Blood Loss 500 mL    Total Output 1695 mL       Net    Net Volume 1963.67 mL          Specimen: No specimens collected     Staff:   Circulator: Lois  Scrub Person: Nora          Findings: ***    Complications:  None; patient tolerated the procedure well.     Disposition: ICU - intubated and hemodynamically stable.  Condition: stable  Specimens Collected: No specimens collected  Attending Attestation: I was present and scrubbed for the entire procedure.    Hubert Lovell  Phone Number: [phone]

## 2024-06-28 NOTE — ADDENDUM NOTE
Addendum  created 06/28/24 1418 by Bo Rangel,     Attestation recorded in Intraprocedure (Anesthesia), Intraprocedure Attestations filed (Anesthesia), Intraprocedure Staff edited (Anesthesia)

## 2024-06-28 NOTE — ANESTHESIA PROCEDURE NOTES
Arterial Line:    Date/Time: 6/28/2024 7:11 AM    Staffing  Performed: attending   Authorized by: Anh Dhillon MD    Performed by: Anh Dhillon MD    An arterial line was placed. Procedure performed using surface landmarks.in the pre-op for the following indication(s): continuous blood pressure monitoring and blood sampling needed.    A  (size) (length), Angiocath (type) catheter was placed into the Right radial artery, secured by Tegaderm,   Seldinger technique used.  Events:  patient tolerated procedure well with no complications.

## 2024-06-28 NOTE — ANESTHESIA PREPROCEDURE EVALUATION
Patient: Kamar Garcia    Procedure Information       Date/Time: 06/28/24 0730    Procedure: Creation Bypass Graft Coronary Artery - Lyman-Mohamud    Location: [facility] / [facility]    Surgeons: Hubert Lovell MD            Relevant Problems   Cardiac   (+) Coronary artery disease of native artery of native heart with stable angina pectoris (CMS-HCC)   (+) HTN (hypertension)      Pulmonary   (+) LINA (obstructive sleep apnea)      Neuro   (+) CVA (cerebral vascular accident) (Multi)       Clinical information reviewed:   Tobacco  Allergies  Meds  Problems  Med Hx  Surg Hx   Fam Hx  Soc   Hx        NPO Detail:  NPO/Void Status  Carbohydrate Drink Given Prior to Surgery? : N  Date of Last Liquid: 06/27/24  Time of Last Liquid: 2200  Date of Last Solid: 06/27/24  Time of Last Solid: 2000  Last Intake Type: Clear fluids; Food  Time of Last Void: 0300         Physical Exam    Airway  Mallampati: II  TM distance: >3 FB  Neck ROM: full     Cardiovascular - normal exam     Dental    Pulmonary - normal exam     Abdominal - normal exam             Anesthesia Plan    History of general anesthesia?: yes  History of complications of general anesthesia?: no    ASA 4     general   (A-Line, PA catheter, RENÉE)  The patient is not a current smoker.  Patient did not smoke on day of procedure.    intravenous induction   Anesthetic plan and risks discussed with patient.    Plan discussed with CRNA and attending.

## 2024-06-28 NOTE — ANESTHESIA PROCEDURE NOTES
Airway  Date/Time: 6/28/2024 8:09 AM  Urgency: elective    Airway not difficult    Staffing  Performed: attending   Authorized by: Anh Dhillon MD    Performed by: JES Salazar-CRNA  Patient location during procedure: OR    Indications and Patient Condition  Indications for airway management: anesthesia  Spontaneous ventilation: present  Sedation level: deep  Preoxygenated: yes  Patient position: sniffing  MILS maintained throughout  Mask difficulty assessment: 2 - vent by mask + OA or adjuvant +/- NMBA  Planned trial extubation    Final Airway Details  Final airway type: endotracheal airway      Successful airway: ETT  Cuffed: yes   Successful intubation technique: video laryngoscopy  Endotracheal tube insertion site: oral  Blade size: #4  ETT size (mm): 8.0  Cormack-Lehane Classification: grade I - full view of glottis  Placement verified by: capnometry   Measured from: lips  ETT to lips (cm): 24  Number of attempts at approach: 1  Ventilation between attempts: none  Number of other approaches attempted: 0

## 2024-06-28 NOTE — PROGRESS NOTES
Kamar Radha had heart surgery on 6/28/24 and will be off work for 12 weeks following surgery. His return to work date would be 9/30/24 without restrictions.

## 2024-06-28 NOTE — ANESTHESIA PROCEDURE NOTES
PA Catheter Placement:    Date/Time: 6/28/2024 7:19 AM    A PA catheter was placed in the pre-op for the following indication(s): central venous access and CVP monitoring.    Staffing  Performed: attending   Authorized by: Anh Dhillon MD    Performed by: Anh Dhillon MD    Completed: patient identified, IV checked, site marked, risks and benefits discussed, surgical consent, monitors and equipment checked, pre-op evaluation and timeout performed    Procedure Information    Sterility preparation included the following: provider hand hygiene performed prior to central venous catheter insertion, all 5 sterile barriers used (gloves, gown, cap, mask, large sterile drape) during central venous catheter insertion and skin prep agent completely dried prior to procedure.    Medical reason for not performing maximal sterile barrier technique: no    The site was prepped with ChloraPrep.  Skin prep agent completely dried prior to procedure.    The patient was placed in Trendelenburg position.     PA catheter laterality: Right  Site: internal jugular vein  PA catheter placement: distal introducer port  Catheter size: 7.5  Catheter type: oximetric with 1 attempt(s)      The PAC placement was confirmed by pressure tracing changes, x-ray and transduced waveform.    Post insertion care included: dressing applied.     Procedure was uneventful.

## 2024-06-28 NOTE — H&P
[facility] Critical Care Medicine       Date:  6/28/2024  Patient:  Kamar Garcia  YOB: 1970  MRN:  [id number]   Admit Date:  6/28/2024  ========================================================================================================    History of Present Illness:  Kamar Garcia is a 53 y.o. year old male patient with Past Medical History of HTN, hypothyroidism, HFrEF (35-40%), HLD, prior CVA was evaluated in June by Dr. Lovell for surgical revascularization. He had stroke in June that prompted a LHC by echocardiogram. His LHC showed multivessel coronary artery disease including his LAD, D1, Circumflex, OM1, OM2 and RCA. In his best interest, patient opted for surgical revascularization.       Interval ICU Events:  6/28: Patient underwent CABG x 3 (LIMA-DIAG-LAD, SVG-OM2), Right Leg EVH and median sternotomy. Cardiopulmonary Bypass Time 118 min, XC time 95 min. Given 2200 ml of crystalloid fluid, 777 Cell Saver. UOP during procedure 1200mls. R and left Pleural Chest Tubes and 1 Mediastinal Chest tubes each with minimal sanguinous drainage. Required x 4 shocks s/p spb. Returned to ICU on nitroglycerin and epinephrine gtt due to low index    Medical History:  Past Medical History:   Diagnosis Date    Coronary artery disease     COVID-19     NOT VACCINATED    Dyslipidemia     HTN (hypertension)     Hyperlipidemia     Hypothyroidism     Stroke (Multi)      Past Surgical History:   Procedure Laterality Date    CARDIAC CATHETERIZATION      HERNIA REPAIR Right     inguinal     Medications Prior to Admission   Medication Sig Dispense Refill Last Dose    aspirin 81 mg EC tablet Take 1 tablet (81 mg) by mouth early in the morning..   6/28/2024 at 0300    atorvastatin (Lipitor) 40 mg tablet Take 1 tablet (40 mg) by mouth early in the morning..   6/27/2024    furosemide (Lasix) 40 mg tablet Take 1 tablet (40 mg) by mouth early in the morning..   6/27/2024    levothyroxine (Synthroid, Levoxyl) 50 mcg tablet  "Take 1 tablet (50 mcg) by mouth early in the morning..   6/27/2024    lisinopril 10 mg tablet Take 1 tablet (10 mg) by mouth early in the morning..   Past Week    metoprolol succinate XL (Toprol-XL) 50 mg 24 hr tablet Take 1 tablet (50 mg) by mouth early in the morning..   6/28/2024 at 0300    mupirocin (Bactroban) 2 % ointment Apply topically. Apply to both nostrils twice daily 5 days before surgery   6/27/2024     Patient has no known allergies.  Social History     Tobacco Use    Smoking status: Former     Types: Cigarettes    Smokeless tobacco: Former     Types: Snuff   Vaping Use    Vaping status: Never Used   Substance Use Topics    Alcohol use: Never    Drug use: Never     Family History   Problem Relation Name Age of Onset    Pancreatic cancer Mother      COPD Father         Review of Systems:  14 point review of systems was completed and negative except for those specially mention in my HPI    Physical Exam:    Heart Rate:  [54-78]   Temp:  [36.2 °C (97.2 °F)-36.8 °C (98.2 °F)]   Resp:  [14-25]   BP: (169-184)/(75-89)   Height:  [175.3 cm (5' 9\")]   Weight:  [112 kg (246 lb 0.5 oz)]   SpO2:  [79 %-100 %]     Physical Exam  Constitutional:       Appearance: He is overweight.      Interventions: He is sedated and intubated.   HENT:      Head: Normocephalic and atraumatic.   Neck:      Comments: RIJ CVC and cordis CDI   Cardiovascular:      Rate and Rhythm: Normal rate and regular rhythm.      Pulses: Normal pulses.      Heart sounds: Normal heart sounds.      No friction rub.      Comments: V Wires in place, currently not having to be paced   Pulmonary:      Effort: He is intubated.      Breath sounds: Normal breath sounds. No stridor, decreased air movement or transmitted upper airway sounds. No wheezing or rhonchi.      Comments: 1 mediastinal chest tube, 1 R and 1 L pleural chest tube both with minimal sanguinous drainage   Chest:      Comments: Sternotomy Incision CDI   Abdominal:      General: Abdomen is " flat. Bowel sounds are normal.      Palpations: Abdomen is soft.   Musculoskeletal:      Right lower leg: No edema.      Left lower leg: No edema.   Skin:     Comments: EVH site with Ace Wrap CDI    Neurological:      Comments: TAMMI         Objective:    XR chest 1 view    Result Date: 6/28/2024  Interpreted By:  Schoenberger, Joseph, STUDY: XR CHEST 1 VIEW;  6/28/2024 2:31 pm   INDICATION: Signs/Symptoms:s/p cabg.   COMPARISON: Exam performed at 7:46 a.m.   ACCESSION NUMBER(S): [id number]   ORDERING CLINICIAN: BEN JURADO   FINDINGS: In the interval since the prior, the patient has undergone a median sternotomy. The Reva-Mohamud catheter tip may have advanced somewhat and is likely in the proximal left pulmonary artery. There is an endotracheal tube with its tip near the level of thoracic inlet 4.8 cm above the francisca. The nasogastric tube is noted the with its distal tip below the gastroesophageal junction only slightly and likely should be advanced. There is a midline chest drain and left basal chest tube without evidence for pneumothorax.       CARDIOMEDIASTINAL SILHOUETTE: Cardiac silhouette is enlarged possibly exaggerated due to hypoventilatory exam and AP projection. No venous congestion.   LUNGS: There is new subsegmental atelectasis in the left lung base retrocardiac region. A very small amount left pleural effusion is a consideration.   ABDOMEN: No remarkable upper abdominal findings.   BONES: No acute osseous changes.       1.  Expected postoperative findings after median sternotomy and open heart surgery. See detailed discussion above. 2. The nasogastric tube only projects slightly below the gastroesophageal junction and should be advanced.       MACRO: None   Signed by: Joseph Schoenberger 6/28/2024 2:46 PM Dictation workstation:   [id number]    Anesthesia Intraoperative Transesophageal Echocardiogram    Result Date: 6/28/2024          [place] [id number]   Tel [phone] Fax [phone] TRANSESOPHAGEAL ECHOCARDIOGRAM REPORT Patient Name:      ERWIN ROMREO        Bernie Physician:    [id number] Anh Dhillon MD Study Date:        6/28/2024           Ordering Provider:    [id number] KENN CASTRO                                                              HERNANDEZ MRN/PID:           [id number]            Fellow: Accession#:        [id number]        Nurse: Date of Birth/Age: 1970 / 53     Sonographer:          MD barclay Gender:            M                   Additional Staff: Height:            175.26 cm           Admit Date: Weight:            111.59 kg           Admission Status: BSA / BMI:         2.26 m2 / 36.33     Department Location:                    kg/m2 Study Type:    ANESTHESIA INTRAOPERATIVE RENÉE Diagnosis/ICD: Coronary atherosclerosis due to calcified coronary lesion-I25.84  Study Detail: The following Echo studies were performed: 2D, M-Mode, Doppler and               color flow.  PHYSICIAN INTERPRETATION: RENÉE Details: Color flow Doppler echo was performed to assess for the presence of a patent foramen ovale. RENÉE Medication: The patient was sedated by Anesthesia; please refer to anesthesia flow sheet for medications used. RENÉE Procedure: The probe was passed without difficulty. Left Ventricle: The left ventricular systolic function is mildly decreased, with a visually estimated ejection fraction of 40-45%. Wall motion is abnormal. The left ventricular cavity size is normal. Spectral Doppler shows an impaired relaxation pattern of left ventricular diastolic filling. Left Atrium: The left atrium is normal in size. There is no evidence of a patent foramen ovale. There is no mass visualized in the left atrial appendage and there is no thrombus visualized in the left atrial appendage. Right Ventricle: The right ventricle is normal in size. There is normal right ventricular global systolic  function. Right Atrium: The right atrium is normal in size. Aortic Valve: The aortic valve appears structurally normal. There is no evidence of aortic valve regurgitation. The peak instantaneous gradient of the aortic valve is 7.4 mmHg. The mean gradient of the aortic valve is 3.0 mmHg. Mitral Valve: The mitral valve is normal in structure. There is trace mitral valve regurgitation. Tricuspid Valve: The tricuspid valve is structurally normal. There is trace tricuspid regurgitation. Pulmonic Valve: The pulmonic valve is structurally normal. There is trace pulmonic valve regurgitation. Pericardium: There is no pericardial effusion noted. Aorta: The aortic root is normal.  CONCLUSIONS:  1. The left ventricular systolic function is mildly decreased, with a visually estimated ejection fraction of 40-45%.  2. Spectral Doppler shows an impaired relaxation pattern of left ventricular diastolic filling.  3. There is normal right ventricular global systolic function.  4. Impaired relaxation with reversal of the E/A ratio.  5. Initially EF value is between 40 to 50%, postoperatively more in the 50%.  6. Pre-bypass that was noticeable inferior hypokinesia, but that was improved post bypass. QUANTITATIVE DATA SUMMARY: M-MODE MEASUREMENTS:                 Normal Ranges: IVSd:   3.55 cm (0.6-1.1cm) LVIDd:  5.34 cm (3.9-5.9cm) LVIDs:  3.94 cm LV % FS 26.3 % LV SYSTOLIC FUNCTION BY 2D PLANIMETRY (MOD):                      Normal Ranges: EF-A4C View:    50 % (>=55%) EF-Visual:      43 % LV EF Reported: 43 % LV DIASTOLIC FUNCTION:                         Normal Ranges: MV e'         0.210 m/s (>8.0) MV lateral e' 0.27 m/s MV medial e'  0.15 m/s AORTIC VALVE:                       Normal Ranges: AoV Vmax:    1.36 m/s (<=1.7m/s) AoV Peak P.4 mmHg (<20mmHg) AoV Mean PG: 3.0 mmHg (1.7-11.5mmHg) AoV VTI:     29.80 cm (18-25cm)  [id number] Anh Dhillon MD Electronically signed on 2024 at 2:09:04 PM  ** Final **     ECG 12 Lead    Result  Date: 6/28/2024  Normal sinus rhythm Prolonged QT Abnormal ECG No previous ECGs available    XR chest 1 view    Result Date: 6/28/2024  Interpreted By:  Gui Hernandez, STUDY: XR CHEST 1 VIEW;  6/28/2024 7:51 am   INDICATION: Signs/Symptoms:post swan placement.   COMPARISON: CT chest without contrast 19 June 2024   ACCESSION NUMBER(S): [id number]   ORDERING CLINICIAN: TROY ELLIS   TECHNIQUE: Single frontal view of the chest; Portable technique   FINDINGS: New right IJ approach pulmonary artery line tip is flipped projecting partially either anteriorly or posteriorly, overlies distal pulmonary outflow tract   No pneumothorax   No acute airspace disease   No large pleural effusion   The cardiomediastinal silhouette is unchanged       No pneumothorax with right IJ approach central line tip projecting over distal pulmonary outflow tract   MACRO: None   Signed by: Gui Hernandez 6/28/2024 8:19 AM Dictation workstation:   [id number]      Results for orders placed or performed during the hospital encounter of 06/28/24 (from the past 24 hour(s))   Anesthesia Intraoperative Transesophageal Echocardiogram   Result Value Ref Range    AV mn grad 3.0 mmHg    AV pk jay 1.36 m/s    LV EF 43 %    AV pk grad 7.4 mmHg    LV A4C EF 50.4    Prepare RBC: 4 Units   Result Value Ref Range    PRODUCT CODE I7272N10     Unit Number [id number]     Unit ABO O     Unit RH POS     XM INTEP COMP     Dispense Status XM     Blood Expiration Date July 23, 2024 23:59 EDT     PRODUCT BLOOD TYPE 5100     UNIT VOLUME 350     PRODUCT CODE G1555T77     Unit Number [id number]     Unit ABO O     Unit RH POS     XM INTEP COMP     Dispense Status XM     Blood Expiration Date July 23, 2024 23:59 EDT     PRODUCT BLOOD TYPE 5100     UNIT VOLUME 350    VERIFY ABO/Rh Group Test   Result Value Ref Range    ABO TYPE O     Rh TYPE POS    Blood Gas Arterial Full Panel Unsolicited   Result Value Ref Range    POCT pH, Arterial 7.40 7.38 - 7.42 pH    POCT pCO2,  Arterial 45 (H) 38 - 42 mm Hg    POCT pO2, Arterial 100 (H) 85 - 95 mm Hg    POCT SO2, Arterial 99 94 - 100 %    POCT Oxy Hemoglobin, Arterial 96.5 94.0 - 98.0 %    POCT Hematocrit Calculated, Arterial 53.0 (H) 41.0 - 52.0 %    POCT Sodium, Arterial 134 (L) 136 - 145 mmol/L    POCT Potassium, Arterial 4.4 3.5 - 5.3 mmol/L    POCT Chloride, Arterial 103 98 - 107 mmol/L    POCT Ionized Calcium, Arterial 1.27 1.10 - 1.33 mmol/L    POCT Glucose, Arterial 122 (H) 74 - 99 mg/dL    POCT Lactate, Arterial 1.3 0.4 - 2.0 mmol/L    POCT Base Excess, Arterial 2.3 -2.0 - 3.0 mmol/L    POCT HCO3 Calculated, Arterial 27.9 (H) 22.0 - 26.0 mmol/L    POCT Hemoglobin, Arterial 17.6 (H) 13.5 - 17.5 g/dL    POCT Anion Gap, Arterial 8 (L) 10 - 25 mmo/L    Patient Temperature     Coox Panel, Arterial Unsolicited   Result Value Ref Range    POCT Hemoglobin, Arterial 17.6 (H) 13.5 - 17.5 g/dL    POCT Oxy Hemoglobin, Arterial 96.5 94.0 - 98.0 %    POCT Carboxyhemoglobin, Arterial 2.0 %    POCT Methemoglobin, Arterial 0.9 0.0 - 1.5 %    POCT Deoxy Hemoglobin, Arterial 0.7 0.0 - 5.0 %   Blood Gas Arterial Full Panel Unsolicited   Result Value Ref Range    POCT pH, Arterial 7.38 7.38 - 7.42 pH    POCT pCO2, Arterial 44 (H) 38 - 42 mm Hg    POCT pO2, Arterial 103 (H) 85 - 95 mm Hg    POCT SO2, Arterial 99 94 - 100 %    POCT Oxy Hemoglobin, Arterial 96.6 94.0 - 98.0 %    POCT Hematocrit Calculated, Arterial 53.0 (H) 41.0 - 52.0 %    POCT Sodium, Arterial 133 (L) 136 - 145 mmol/L    POCT Potassium, Arterial 4.4 3.5 - 5.3 mmol/L    POCT Chloride, Arterial 102 98 - 107 mmol/L    POCT Ionized Calcium, Arterial 1.26 1.10 - 1.33 mmol/L    POCT Glucose, Arterial 119 (H) 74 - 99 mg/dL    POCT Lactate, Arterial 1.4 0.4 - 2.0 mmol/L    POCT Base Excess, Arterial 0.4 -2.0 - 3.0 mmol/L    POCT HCO3 Calculated, Arterial 26.0 22.0 - 26.0 mmol/L    POCT Hemoglobin, Arterial 17.7 (H) 13.5 - 17.5 g/dL    POCT Anion Gap, Arterial 9 (L) 10 - 25 mmo/L    Patient  Temperature      FiO2 21 %   Coox Panel, Arterial Unsolicited   Result Value Ref Range    POCT Hemoglobin, Arterial 17.7 (H) 13.5 - 17.5 g/dL    POCT Oxy Hemoglobin, Arterial 96.6 94.0 - 98.0 %    POCT Carboxyhemoglobin, Arterial 1.8 %    POCT Methemoglobin, Arterial 1.0 0.0 - 1.5 %    POCT Deoxy Hemoglobin, Arterial 0.7 0.0 - 5.0 %   Blood Gas Mixed Venous Full Panel Unsolicited   Result Value Ref Range    POCT pH, Mixed 7.29 (L) 7.33 - 7.43 pH    POCT pCO2, Mixed 58 (H) 41 - 51 mm Hg    POCT pO2, Mixed 58 (H) 35 - 45 mm Hg    POCT SO2, Mixed 88 (H) 45 - 75 %    POCT Oxy Hemoglobin, Mixed 85.3 (H) 45.0 - 75.0 %    POCT Hematocrit Calculated, Mixed 51.0 41.0 - 52.0 %    POCT Sodium, Mixed 134 (L) 136 - 145 mmol/L    POCT Potassium, Mixed 4.4 3.5 - 5.3 mmol/L    POCT Chloride, Mixed 102 98 - 107 mmol/L    POCT Ionized Calcium, Mixed 1.29 1.10 - 1.33 mmol/L    POCT Glucose, Mixed 119 (H) 74 - 99 mg/dL    POCT Lactate, Mixed 1.7 0.4 - 2.0 mmol/L    POCT Base Excess, Mixed -0.3 -2.0 - 3.0 mmol/L    POCT HCO3 Calculated, Mixed 27.9 (H) 22.0 - 26.0 mmol/L    POCT Hemoglobin, Mixed 17.1 13.5 - 17.5 g/dL    POCT Anion Gap, Mixed 9 (L) 10 - 25 mmo/L    Patient Temperature      FiO2 21 %   Blood Gas Arterial Full Panel Unsolicited   Result Value Ref Range    POCT pH, Arterial 7.28 (L) 7.38 - 7.42 pH    POCT pCO2, Arterial 53 (H) 38 - 42 mm Hg    POCT pO2, Arterial 396 (H) 85 - 95 mm Hg    POCT SO2, Arterial 100 94 - 100 %    POCT Oxy Hemoglobin, Arterial 97.9 94.0 - 98.0 %    POCT Hematocrit Calculated, Arterial 47.0 41.0 - 52.0 %    POCT Sodium, Arterial 130 (L) 136 - 145 mmol/L    POCT Potassium, Arterial 4.6 3.5 - 5.3 mmol/L    POCT Chloride, Arterial 102 98 - 107 mmol/L    POCT Ionized Calcium, Arterial 1.21 1.10 - 1.33 mmol/L    POCT Glucose, Arterial 206 (H) 74 - 99 mg/dL    POCT Lactate, Arterial 1.5 0.4 - 2.0 mmol/L    POCT Base Excess, Arterial -2.7 (L) -2.0 - 3.0 mmol/L    POCT HCO3 Calculated, Arterial 24.9 22.0 -  26.0 mmol/L    POCT Hemoglobin, Arterial 15.5 13.5 - 17.5 g/dL    POCT Anion Gap, Arterial 8 (L) 10 - 25 mmo/L    Patient Temperature      FiO2 100 %   Coox Panel, Arterial Unsolicited   Result Value Ref Range    POCT Hemoglobin, Arterial 15.5 13.5 - 17.5 g/dL    POCT Oxy Hemoglobin, Arterial 97.9 94.0 - 98.0 %    POCT Carboxyhemoglobin, Arterial 1.0 %    POCT Methemoglobin, Arterial 1.0 0.0 - 1.5 %    POCT Deoxy Hemoglobin, Arterial 0.1 0.0 - 5.0 %   Blood Gas Arterial Full Panel Unsolicited   Result Value Ref Range    POCT pH, Arterial 7.28 (L) 7.38 - 7.42 pH    POCT pCO2, Arterial 59 (H) 38 - 42 mm Hg    POCT pO2, Arterial 358 (H) 85 - 95 mm Hg    POCT SO2, Arterial 100 94 - 100 %    POCT Oxy Hemoglobin, Arterial 97.7 94.0 - 98.0 %    POCT Hematocrit Calculated, Arterial 42.0 41.0 - 52.0 %    POCT Sodium, Arterial 131 (L) 136 - 145 mmol/L    POCT Potassium, Arterial 5.0 3.5 - 5.3 mmol/L    POCT Chloride, Arterial 102 98 - 107 mmol/L    POCT Ionized Calcium, Arterial 1.17 1.10 - 1.33 mmol/L    POCT Glucose, Arterial 145 (H) 74 - 99 mg/dL    POCT Lactate, Arterial 1.8 0.4 - 2.0 mmol/L    POCT Base Excess, Arterial -0.3 -2.0 - 3.0 mmol/L    POCT HCO3 Calculated, Arterial 27.7 (H) 22.0 - 26.0 mmol/L    POCT Hemoglobin, Arterial 13.9 13.5 - 17.5 g/dL    POCT Anion Gap, Arterial 6 (L) 10 - 25 mmo/L    Patient Temperature      FiO2 80 %   Coox Panel, Arterial Unsolicited   Result Value Ref Range    POCT Hemoglobin, Arterial 13.9 13.5 - 17.5 g/dL    POCT Oxy Hemoglobin, Arterial 97.7 94.0 - 98.0 %    POCT Carboxyhemoglobin, Arterial 1.4 %    POCT Methemoglobin, Arterial 0.9 0.0 - 1.5 %    POCT Deoxy Hemoglobin, Arterial 0.0 0.0 - 5.0 %   Blood Gas Venous Full Panel Unsolicited   Result Value Ref Range    POCT pH, Venous 7.26 (L) 7.33 - 7.43 pH    POCT pCO2, Venous 63 (H) 41 - 51 mm Hg    POCT pO2, Venous 63 (H) 35 - 45 mm Hg    POCT SO2, Venous 89 (H) 45 - 75 %    POCT Oxy Hemoglobin, Venous 87.1 (H) 45.0 - 75.0 %     POCT Hematocrit Calculated, Venous 41.0 41.0 - 52.0 %    POCT Sodium, Venous 131 (L) 136 - 145 mmol/L    POCT Potassium, Venous 5.2 3.5 - 5.3 mmol/L    POCT Chloride, Venous 102 98 - 107 mmol/L    POCT Ionized Calicum, Venous 1.20 1.10 - 1.33 mmol/L    POCT Glucose, Venous 144 (H) 74 - 99 mg/dL    POCT Lactate, Venous 1.8 0.4 - 2.0 mmol/L    POCT Base Excess, Venous -0.3 -2.0 - 3.0 mmol/L    POCT HCO3 Calculated, Venous 28.3 (H) 22.0 - 26.0 mmol/L    POCT Hemoglobin, Venous 13.8 13.5 - 17.5 g/dL    POCT Anion Gap, Venous 6.0 (L) 10.0 - 25.0 mmol/L    Patient Temperature      FiO2 80 %   Blood Gas Arterial Full Panel Unsolicited   Result Value Ref Range    POCT pH, Arterial 7.39 7.38 - 7.42 pH    POCT pCO2, Arterial 43 (H) 38 - 42 mm Hg    POCT pO2, Arterial 308 (H) 85 - 95 mm Hg    POCT SO2, Arterial 100 94 - 100 %    POCT Oxy Hemoglobin, Arterial 97.4 94.0 - 98.0 %    POCT Hematocrit Calculated, Arterial 43.0 41.0 - 52.0 %    POCT Sodium, Arterial 131 (L) 136 - 145 mmol/L    POCT Potassium, Arterial 5.5 (H) 3.5 - 5.3 mmol/L    POCT Chloride, Arterial 102 98 - 107 mmol/L    POCT Ionized Calcium, Arterial 1.17 1.10 - 1.33 mmol/L    POCT Glucose, Arterial 122 (H) 74 - 99 mg/dL    POCT Lactate, Arterial 1.5 0.4 - 2.0 mmol/L    POCT Base Excess, Arterial 0.7 -2.0 - 3.0 mmol/L    POCT HCO3 Calculated, Arterial 26.0 22.0 - 26.0 mmol/L    POCT Hemoglobin, Arterial 14.2 13.5 - 17.5 g/dL    POCT Anion Gap, Arterial 9 (L) 10 - 25 mmo/L    Patient Temperature      FiO2 80 %   Coox Panel, Arterial Unsolicited   Result Value Ref Range    POCT Hemoglobin, Arterial 14.2 13.5 - 17.5 g/dL    POCT Oxy Hemoglobin, Arterial 97.4 94.0 - 98.0 %    POCT Carboxyhemoglobin, Arterial 1.4 %    POCT Methemoglobin, Arterial 1.2 0.0 - 1.5 %    POCT Deoxy Hemoglobin, Arterial 0.0 0.0 - 5.0 %   Blood Gas Arterial Full Panel Unsolicited   Result Value Ref Range    POCT pH, Arterial 7.41 7.38 - 7.42 pH    POCT pCO2, Arterial 40 38 - 42 mm Hg    POCT  pO2, Arterial 400 (H) 85 - 95 mm Hg    POCT SO2, Arterial 100 94 - 100 %    POCT Oxy Hemoglobin, Arterial 97.2 94.0 - 98.0 %    POCT Hematocrit Calculated, Arterial 42.0 41.0 - 52.0 %    POCT Sodium, Arterial 130 (L) 136 - 145 mmol/L    POCT Potassium, Arterial 5.1 3.5 - 5.3 mmol/L    POCT Chloride, Arterial 103 98 - 107 mmol/L    POCT Ionized Calcium, Arterial 1.15 1.10 - 1.33 mmol/L    POCT Glucose, Arterial 131 (H) 74 - 99 mg/dL    POCT Lactate, Arterial 1.5 0.4 - 2.0 mmol/L    POCT Base Excess, Arterial 0.7 -2.0 - 3.0 mmol/L    POCT HCO3 Calculated, Arterial 25.4 22.0 - 26.0 mmol/L    POCT Hemoglobin, Arterial 14.1 13.5 - 17.5 g/dL    POCT Anion Gap, Arterial 7 (L) 10 - 25 mmo/L    Patient Temperature      FiO2 90 %   Coox Panel, Arterial Unsolicited   Result Value Ref Range    POCT Hemoglobin, Arterial 14.1 13.5 - 17.5 g/dL    POCT Oxy Hemoglobin, Arterial 97.2 94.0 - 98.0 %    POCT Carboxyhemoglobin, Arterial 1.6 %    POCT Methemoglobin, Arterial 1.2 0.0 - 1.5 %    POCT Deoxy Hemoglobin, Arterial 0.0 0.0 - 5.0 %   Blood Gas Arterial Full Panel Unsolicited   Result Value Ref Range    POCT pH, Arterial 7.38 7.38 - 7.42 pH    POCT pCO2, Arterial 43 (H) 38 - 42 mm Hg    POCT pO2, Arterial 246 (H) 85 - 95 mm Hg    POCT SO2, Arterial 100 94 - 100 %    POCT Oxy Hemoglobin, Arterial 97.8 94.0 - 98.0 %    POCT Hematocrit Calculated, Arterial 43.0 41.0 - 52.0 %    POCT Sodium, Arterial 131 (L) 136 - 145 mmol/L    POCT Potassium, Arterial 4.4 3.5 - 5.3 mmol/L    POCT Chloride, Arterial 107 98 - 107 mmol/L    POCT Ionized Calcium, Arterial 1.42 (H) 1.10 - 1.33 mmol/L    POCT Glucose, Arterial 132 (H) 74 - 99 mg/dL    POCT Lactate, Arterial 1.9 0.4 - 2.0 mmol/L    POCT Base Excess, Arterial 0.0 -2.0 - 3.0 mmol/L    POCT HCO3 Calculated, Arterial 25.4 22.0 - 26.0 mmol/L    POCT Hemoglobin, Arterial 14.4 13.5 - 17.5 g/dL    POCT Anion Gap, Arterial 3 (L) 10 - 25 mmo/L    Patient Temperature      FiO2 100 %   Coox Panel,  Arterial Unsolicited   Result Value Ref Range    POCT Hemoglobin, Arterial 14.4 13.5 - 17.5 g/dL    POCT Oxy Hemoglobin, Arterial 97.8 94.0 - 98.0 %    POCT Carboxyhemoglobin, Arterial 1.2 %    POCT Methemoglobin, Arterial 1.0 0.0 - 1.5 %    POCT Deoxy Hemoglobin, Arterial 0.0 0.0 - 5.0 %   Magnesium   Result Value Ref Range    Magnesium 3.93 (H) 1.60 - 2.40 mg/dL   Coagulation Screen   Result Value Ref Range    Protime 12.0 9.8 - 12.8 seconds    INR 1.1 0.9 - 1.1    aPTT 30 27 - 38 seconds   Fibrinogen   Result Value Ref Range    Fibrinogen 243 200 - 400 mg/dL   CBC   Result Value Ref Range    WBC 22.4 (H) 4.4 - 11.3 x10*3/uL    nRBC 0.0 0.0 - 0.0 /100 WBCs    RBC 5.71 4.50 - 5.90 x10*6/uL    Hemoglobin 15.9 13.5 - 17.5 g/dL    Hematocrit 47.2 41.0 - 52.0 %    MCV 83 80 - 100 fL    MCH 27.8 26.0 - 34.0 pg    MCHC 33.7 32.0 - 36.0 g/dL    RDW 13.5 11.5 - 14.5 %    Platelets 168 150 - 450 x10*3/uL   Renal Function Panel   Result Value Ref Range    Glucose 121 (H) 74 - 99 mg/dL    Sodium 137 136 - 145 mmol/L    Potassium 4.6 3.5 - 5.3 mmol/L    Chloride 105 98 - 107 mmol/L    Bicarbonate 24 21 - 32 mmol/L    Anion Gap 13 10 - 20 mmol/L    Urea Nitrogen 21 6 - 23 mg/dL    Creatinine 1.08 0.50 - 1.30 mg/dL    eGFR 82 >60 mL/min/1.73m*2    Calcium 9.2 8.6 - 10.3 mg/dL    Phosphorus 4.7 2.5 - 4.9 mg/dL    Albumin 3.6 3.4 - 5.0 g/dL   Basic Metabolic Panel   Result Value Ref Range    Glucose 121 (H) 74 - 99 mg/dL    Sodium 137 136 - 145 mmol/L    Potassium 4.6 3.5 - 5.3 mmol/L    Chloride 105 98 - 107 mmol/L    Bicarbonate 24 21 - 32 mmol/L    Anion Gap 13 10 - 20 mmol/L    Urea Nitrogen 21 6 - 23 mg/dL    Creatinine 1.08 0.50 - 1.30 mg/dL    eGFR 82 >60 mL/min/1.73m*2    Calcium 9.2 8.6 - 10.3 mg/dL   POCT GLUCOSE   Result Value Ref Range    POCT Glucose 131 (H) 74 - 99 mg/dL   ECG 12 Lead   Result Value Ref Range    Ventricular Rate 78 BPM    Atrial Rate 78 BPM    WY Interval 194 ms    QRS Duration 112 ms    QT Interval  440 ms    QTC Calculation(Bazett) 501 ms    P Axis 55 degrees    R Axis 50 degrees    T Axis 38 degrees    QRS Count 13 beats    Q Onset 207 ms    P Onset 110 ms    P Offset 183 ms    T Offset 427 ms    QTC Fredericia 480 ms   Blood gas arterial   Result Value Ref Range    POCT pH, Arterial 7.26 (L) 7.38 - 7.42 pH    POCT pCO2, Arterial 53 (H) 38 - 42 mm Hg    POCT pO2, Arterial 216 (H) 85 - 95 mm Hg    POCT SO2, Arterial 100 94 - 100 %    POCT Oxy Hemoglobin, Arterial 97.8 94.0 - 98.0 %    POCT Base Excess, Arterial -4.2 (L) -2.0 - 3.0 mmol/L    POCT HCO3 Calculated, Arterial 23.8 22.0 - 26.0 mmol/L    Patient Temperature 37.0 degrees Celsius    FiO2 100 %   Blood Gas Arterial Full Panel   Result Value Ref Range    POCT pH, Arterial 7.26 (L) 7.38 - 7.42 pH    POCT pCO2, Arterial 53 (H) 38 - 42 mm Hg    POCT pO2, Arterial 216 (H) 85 - 95 mm Hg    POCT SO2, Arterial 100 94 - 100 %    POCT Oxy Hemoglobin, Arterial 97.8 94.0 - 98.0 %    POCT Hematocrit Calculated, Arterial 50.0 41.0 - 52.0 %    POCT Sodium, Arterial 132 (L) 136 - 145 mmol/L    POCT Potassium, Arterial 4.8 3.5 - 5.3 mmol/L    POCT Chloride, Arterial 102 98 - 107 mmol/L    POCT Ionized Calcium, Arterial 1.24 1.10 - 1.33 mmol/L    POCT Glucose, Arterial 126 (H) 74 - 99 mg/dL    POCT Lactate, Arterial 1.9 0.4 - 2.0 mmol/L    POCT Base Excess, Arterial -4.2 (L) -2.0 - 3.0 mmol/L    POCT HCO3 Calculated, Arterial 23.8 22.0 - 26.0 mmol/L    POCT Hemoglobin, Arterial 16.7 13.5 - 17.5 g/dL    POCT Anion Gap, Arterial 11 10 - 25 mmo/L    Patient Temperature 37.0 degrees Celsius    FiO2 100 %   POCT GLUCOSE   Result Value Ref Range    POCT Glucose 159 (H) 74 - 99 mg/dL        Assessment/Plan:    I am currently managing this critically ill patient for the following problems:    Neuro/Psych/Pain Ctrl/Sedation:  #Post-Op Pain  #Hx CVA  -Arrived to ICU intubated, sedated, with paralytic on board.  -Neuro checks, CAM Assessment, Delirium precautions  -PRN Dilaudid,  oxycodone, Tylenol for pain control  -Avoid Toradol on POD 0, can consider POD 1  -Lidocaine patches    Respiratory/ENT:  #Post-Op Respiratory Insufficiency  -No underlying pulmonary disease  -Arrived to ICU intubated, mechanically ventilated  -Continue sedation with propofol infusion, wean as tolerated, RASS goal -1 to 0  -Once NMB reversed, initiate CPAP trials, extubate when awake and CPAP trial passed  -Wean supplemental oxygen for spO2 goal > 92%  -ABG as needed  -Daily CXR    Cardiovascular:  #Triple Vessel CAD s/p CABG x 3  Patient underwent CABG x 3 (LIMA-DIAG-LAD, SVG-OM2), Right Leg EVH and median sternotomy.   -Returned to ICU paralyzed on nitroglycerin gtt and epinephrine gtt   -AV wires in place, currently on being paced  -2 pleural, 1 mediastinal chest tube, maintain to wall suction, monitor output, notify if > 100cc/hr  -Volume resuscitation postoperatively with 250cc boluses LR as needed and albumin 5%  -PA in place, monitor hemodynamics  -Wean vasoactive medications to MAP 70-90, CI > 2.2  -Daily BMP, keep K >4, Mg > 2  -Start aspirin and statin POD #0  -Roxana-op Ancef x 48 hours  -PT/OT consult and OOB POD #1  -CVS following and appreciate further management    GI:  -PPI    Renal/Volume Status (Intra & Extravascular):  No acute issues  No CKD at baseline   Monitor RFP    Endocrine  #Hyperglycemia  -No history of DM, likely stress induced  -Strict blood glucose control post-operatively  -Glucose goal   -Insulin gtt vs SSI per ICU protocol    Infectious Disease:  -Perioperative Ancef x 48 hours    Heme/Onc:  #Acute Blood Loss Anemia Post-Op  -Baseline Hgb 14.0  -Daily CBC  -Transfuse for Hgb goal > 7 or massive blood loss with hemodynamic instability    ORTHO/MSK:  -PT/OT, Cardiac Rehab POD #1    Ethics/Code Status:  FULL CODE    :  DVT Prophylaxis: Holding  GI Prophylaxis: IV PPI, convert to oral when no longer NPO  Bowel Regimen: Docusate, Senna, Miralax  Diet: NPO  CVC: MICHAELLE  CVC, RIJ PA Catheter (6/28)  Bois D Arc: Yes, (right radial )  Gonzalez: Yes, (6/28)  Restraints: yes  Dispo: ICU    Critical Care Time: 55 MIN     JES Esposito-CNP  Pulmonary & Critical Care Medicine  [facility]

## 2024-06-29 ENCOUNTER — APPOINTMENT (OUTPATIENT)
Dept: CARDIOLOGY | Facility: HOSPITAL | Age: 54
End: 2024-06-29
Payer: MEDICAID

## 2024-06-29 ENCOUNTER — APPOINTMENT (OUTPATIENT)
Dept: RADIOLOGY | Facility: HOSPITAL | Age: 54
End: 2024-06-29
Payer: MEDICAID

## 2024-06-29 LAB
ALBUMIN SERPL BCP-MCNC: 3.7 G/DL (ref 3.4–5)
ANION GAP SERPL CALC-SCNC: 13 MMOL/L (ref 10–20)
BUN SERPL-MCNC: 20 MG/DL (ref 6–23)
CA-I BLD-SCNC: 1.15 MMOL/L (ref 1.1–1.33)
CALCIUM SERPL-MCNC: 8.8 MG/DL (ref 8.6–10.3)
CHLORIDE SERPL-SCNC: 103 MMOL/L (ref 98–107)
CO2 SERPL-SCNC: 23 MMOL/L (ref 21–32)
CREAT SERPL-MCNC: 1.15 MG/DL (ref 0.5–1.3)
EGFRCR SERPLBLD CKD-EPI 2021: 76 ML/MIN/1.73M*2
ERYTHROCYTE [DISTWIDTH] IN BLOOD BY AUTOMATED COUNT: 13.6 % (ref 11.5–14.5)
GLUCOSE BLD MANUAL STRIP-MCNC: 112 MG/DL (ref 74–99)
GLUCOSE BLD MANUAL STRIP-MCNC: 117 MG/DL (ref 74–99)
GLUCOSE BLD MANUAL STRIP-MCNC: 127 MG/DL (ref 74–99)
GLUCOSE BLD MANUAL STRIP-MCNC: 144 MG/DL (ref 74–99)
GLUCOSE BLD MANUAL STRIP-MCNC: 153 MG/DL (ref 74–99)
GLUCOSE BLD MANUAL STRIP-MCNC: 156 MG/DL (ref 74–99)
GLUCOSE BLD MANUAL STRIP-MCNC: 156 MG/DL (ref 74–99)
GLUCOSE SERPL-MCNC: 139 MG/DL (ref 74–99)
HCT VFR BLD AUTO: 42.8 % (ref 41–52)
HGB BLD-MCNC: 14.3 G/DL (ref 13.5–17.5)
MAGNESIUM SERPL-MCNC: 2.4 MG/DL (ref 1.6–2.4)
MCH RBC QN AUTO: 27.8 PG (ref 26–34)
MCHC RBC AUTO-ENTMCNC: 33.4 G/DL (ref 32–36)
MCV RBC AUTO: 83 FL (ref 80–100)
NRBC BLD-RTO: 0 /100 WBCS (ref 0–0)
PHOSPHATE SERPL-MCNC: 4.4 MG/DL (ref 2.5–4.9)
PLATELET # BLD AUTO: 204 X10*3/UL (ref 150–450)
POTASSIUM SERPL-SCNC: 4.8 MMOL/L (ref 3.5–5.3)
RBC # BLD AUTO: 5.15 X10*6/UL (ref 4.5–5.9)
SODIUM SERPL-SCNC: 134 MMOL/L (ref 136–145)
WBC # BLD AUTO: 14 X10*3/UL (ref 4.4–11.3)

## 2024-06-29 PROCEDURE — 2500000004 HC RX 250 GENERAL PHARMACY W/ HCPCS (ALT 636 FOR OP/ED)

## 2024-06-29 PROCEDURE — 2500000002 HC RX 250 W HCPCS SELF ADMINISTERED DRUGS (ALT 637 FOR MEDICARE OP, ALT 636 FOR OP/ED): Performed by: NURSE PRACTITIONER

## 2024-06-29 PROCEDURE — 71045 X-RAY EXAM CHEST 1 VIEW: CPT

## 2024-06-29 PROCEDURE — 93010 ELECTROCARDIOGRAM REPORT: CPT | Performed by: INTERNAL MEDICINE

## 2024-06-29 PROCEDURE — 99233 SBSQ HOSP IP/OBS HIGH 50: CPT | Performed by: NURSE PRACTITIONER

## 2024-06-29 PROCEDURE — 99223 1ST HOSP IP/OBS HIGH 75: CPT | Performed by: INTERNAL MEDICINE

## 2024-06-29 PROCEDURE — 2500000004 HC RX 250 GENERAL PHARMACY W/ HCPCS (ALT 636 FOR OP/ED): Mod: JZ | Performed by: STUDENT IN AN ORGANIZED HEALTH CARE EDUCATION/TRAINING PROGRAM

## 2024-06-29 PROCEDURE — 2500000005 HC RX 250 GENERAL PHARMACY W/O HCPCS: Performed by: STUDENT IN AN ORGANIZED HEALTH CARE EDUCATION/TRAINING PROGRAM

## 2024-06-29 PROCEDURE — 71045 X-RAY EXAM CHEST 1 VIEW: CPT | Performed by: RADIOLOGY

## 2024-06-29 PROCEDURE — 2500000005 HC RX 250 GENERAL PHARMACY W/O HCPCS

## 2024-06-29 PROCEDURE — 82330 ASSAY OF CALCIUM: CPT | Performed by: NURSE PRACTITIONER

## 2024-06-29 PROCEDURE — 93005 ELECTROCARDIOGRAM TRACING: CPT

## 2024-06-29 PROCEDURE — 2500000001 HC RX 250 WO HCPCS SELF ADMINISTERED DRUGS (ALT 637 FOR MEDICARE OP): Performed by: STUDENT IN AN ORGANIZED HEALTH CARE EDUCATION/TRAINING PROGRAM

## 2024-06-29 PROCEDURE — 80069 RENAL FUNCTION PANEL: CPT | Performed by: NURSE PRACTITIONER

## 2024-06-29 PROCEDURE — 2500000001 HC RX 250 WO HCPCS SELF ADMINISTERED DRUGS (ALT 637 FOR MEDICARE OP)

## 2024-06-29 PROCEDURE — 2500000005 HC RX 250 GENERAL PHARMACY W/O HCPCS: Performed by: NURSE PRACTITIONER

## 2024-06-29 PROCEDURE — 82947 ASSAY GLUCOSE BLOOD QUANT: CPT

## 2024-06-29 PROCEDURE — 99291 CRITICAL CARE FIRST HOUR: CPT

## 2024-06-29 PROCEDURE — 83735 ASSAY OF MAGNESIUM: CPT | Performed by: NURSE PRACTITIONER

## 2024-06-29 PROCEDURE — 85027 COMPLETE CBC AUTOMATED: CPT | Performed by: NURSE PRACTITIONER

## 2024-06-29 PROCEDURE — 2500000001 HC RX 250 WO HCPCS SELF ADMINISTERED DRUGS (ALT 637 FOR MEDICARE OP): Performed by: NURSE PRACTITIONER

## 2024-06-29 PROCEDURE — 37799 UNLISTED PX VASCULAR SURGERY: CPT | Performed by: NURSE PRACTITIONER

## 2024-06-29 PROCEDURE — C9113 INJ PANTOPRAZOLE SODIUM, VIA: HCPCS | Performed by: NURSE PRACTITIONER

## 2024-06-29 PROCEDURE — 2500000004 HC RX 250 GENERAL PHARMACY W/ HCPCS (ALT 636 FOR OP/ED): Performed by: NURSE PRACTITIONER

## 2024-06-29 PROCEDURE — 97165 OT EVAL LOW COMPLEX 30 MIN: CPT | Mod: GO

## 2024-06-29 PROCEDURE — 2020000001 HC ICU ROOM DAILY

## 2024-06-29 PROCEDURE — 97161 PT EVAL LOW COMPLEX 20 MIN: CPT | Mod: GP

## 2024-06-29 RX ORDER — METHOCARBAMOL 500 MG/1
500 TABLET, FILM COATED ORAL EVERY 8 HOURS SCHEDULED
Status: DISCONTINUED | OUTPATIENT
Start: 2024-06-29 | End: 2024-07-04 | Stop reason: HOSPADM

## 2024-06-29 RX ORDER — ACETAMINOPHEN 325 MG/1
650 TABLET ORAL EVERY 6 HOURS
Status: DISCONTINUED | OUTPATIENT
Start: 2024-06-29 | End: 2024-07-01

## 2024-06-29 RX ORDER — FUROSEMIDE 10 MG/ML
40 INJECTION INTRAMUSCULAR; INTRAVENOUS ONCE
Status: COMPLETED | OUTPATIENT
Start: 2024-06-29 | End: 2024-06-29

## 2024-06-29 RX ORDER — LIDOCAINE 560 MG/1
1 PATCH PERCUTANEOUS; TOPICAL; TRANSDERMAL DAILY
Status: DISCONTINUED | OUTPATIENT
Start: 2024-06-29 | End: 2024-07-04 | Stop reason: HOSPADM

## 2024-06-29 RX ORDER — METOPROLOL TARTRATE 25 MG/1
25 TABLET, FILM COATED ORAL 2 TIMES DAILY
Status: DISCONTINUED | OUTPATIENT
Start: 2024-06-29 | End: 2024-07-04 | Stop reason: HOSPADM

## 2024-06-29 RX ORDER — POTASSIUM CHLORIDE 20 MEQ/1
40 TABLET, EXTENDED RELEASE ORAL EVERY 6 HOURS PRN
Status: DISCONTINUED | OUTPATIENT
Start: 2024-06-29 | End: 2024-07-04 | Stop reason: HOSPADM

## 2024-06-29 RX ORDER — COLCHICINE 0.6 MG/1
0.6 TABLET ORAL DAILY
Status: DISCONTINUED | OUTPATIENT
Start: 2024-06-29 | End: 2024-07-03

## 2024-06-29 RX ORDER — INSULIN LISPRO 100 [IU]/ML
0-15 INJECTION, SOLUTION INTRAVENOUS; SUBCUTANEOUS
Status: DISCONTINUED | OUTPATIENT
Start: 2024-06-29 | End: 2024-07-03

## 2024-06-29 RX ORDER — SIMETHICONE 80 MG
80 TABLET,CHEWABLE ORAL 4 TIMES DAILY
Status: DISCONTINUED | OUTPATIENT
Start: 2024-06-29 | End: 2024-07-04 | Stop reason: HOSPADM

## 2024-06-29 RX ORDER — PANTOPRAZOLE SODIUM 40 MG/1
40 TABLET, DELAYED RELEASE ORAL
Status: DISCONTINUED | OUTPATIENT
Start: 2024-06-30 | End: 2024-07-01

## 2024-06-29 RX ORDER — POTASSIUM CHLORIDE 20 MEQ/1
20 TABLET, EXTENDED RELEASE ORAL EVERY 6 HOURS PRN
Status: DISCONTINUED | OUTPATIENT
Start: 2024-06-29 | End: 2024-07-04 | Stop reason: HOSPADM

## 2024-06-29 RX ORDER — PANTOPRAZOLE SODIUM 40 MG/10ML
40 INJECTION, POWDER, LYOPHILIZED, FOR SOLUTION INTRAVENOUS
Status: DISCONTINUED | OUTPATIENT
Start: 2024-06-30 | End: 2024-07-01

## 2024-06-29 SDOH — SOCIAL STABILITY: SOCIAL INSECURITY: WERE YOU ABLE TO COMPLETE ALL THE BEHAVIORAL HEALTH SCREENINGS?: YES

## 2024-06-29 SDOH — SOCIAL STABILITY: SOCIAL INSECURITY: HAVE YOU HAD THOUGHTS OF HARMING ANYONE ELSE?: NO

## 2024-06-29 SDOH — SOCIAL STABILITY: SOCIAL INSECURITY: DO YOU FEEL UNSAFE GOING BACK TO THE PLACE WHERE YOU ARE LIVING?: NO

## 2024-06-29 SDOH — SOCIAL STABILITY: SOCIAL INSECURITY: HAVE YOU HAD ANY THOUGHTS OF HARMING ANYONE ELSE?: NO

## 2024-06-29 SDOH — SOCIAL STABILITY: SOCIAL INSECURITY: HAS ANYONE EVER THREATENED TO HURT YOUR FAMILY OR YOUR PETS?: NO

## 2024-06-29 SDOH — SOCIAL STABILITY: SOCIAL INSECURITY: ARE YOU OR HAVE YOU BEEN THREATENED OR ABUSED PHYSICALLY, EMOTIONALLY, OR SEXUALLY BY ANYONE?: NO

## 2024-06-29 SDOH — SOCIAL STABILITY: SOCIAL INSECURITY: ABUSE: ADULT

## 2024-06-29 SDOH — SOCIAL STABILITY: SOCIAL INSECURITY: ARE THERE ANY APPARENT SIGNS OF INJURIES/BEHAVIORS THAT COULD BE RELATED TO ABUSE/NEGLECT?: NO

## 2024-06-29 SDOH — SOCIAL STABILITY: SOCIAL INSECURITY: DOES ANYONE TRY TO KEEP YOU FROM HAVING/CONTACTING OTHER FRIENDS OR DOING THINGS OUTSIDE YOUR HOME?: NO

## 2024-06-29 SDOH — SOCIAL STABILITY: SOCIAL INSECURITY: DO YOU FEEL ANYONE HAS EXPLOITED OR TAKEN ADVANTAGE OF YOU FINANCIALLY OR OF YOUR PERSONAL PROPERTY?: NO

## 2024-06-29 ASSESSMENT — PAIN SCALES - GENERAL
PAINLEVEL_OUTOF10: 4
PAINLEVEL_OUTOF10: 7
PAINLEVEL_OUTOF10: 7
PAINLEVEL_OUTOF10: 1
PAINLEVEL_OUTOF10: 7
PAINLEVEL_OUTOF10: 7
PAINLEVEL_OUTOF10: 2
PAINLEVEL_OUTOF10: 2
PAINLEVEL_OUTOF10: 4
PAINLEVEL_OUTOF10: 5 - MODERATE PAIN
PAINLEVEL_OUTOF10: 2
PAINLEVEL_OUTOF10: 3
PAINLEVEL_OUTOF10: 7
PAINLEVEL_OUTOF10: 10 - WORST POSSIBLE PAIN
PAINLEVEL_OUTOF10: 5 - MODERATE PAIN
PAINLEVEL_OUTOF10: 7

## 2024-06-29 ASSESSMENT — PAIN DESCRIPTION - LOCATION
LOCATION: CHEST
LOCATION: STERNUM

## 2024-06-29 ASSESSMENT — PAIN - FUNCTIONAL ASSESSMENT
PAIN_FUNCTIONAL_ASSESSMENT: 0-10

## 2024-06-29 ASSESSMENT — COGNITIVE AND FUNCTIONAL STATUS - GENERAL
TOILETING: A LITTLE
PERSONAL GROOMING: A LITTLE
TURNING FROM BACK TO SIDE WHILE IN FLAT BAD: A LITTLE
DAILY ACTIVITIY SCORE: 18
DRESSING REGULAR LOWER BODY CLOTHING: A LITTLE
DRESSING REGULAR LOWER BODY CLOTHING: A LITTLE
STANDING UP FROM CHAIR USING ARMS: A LITTLE
MOVING TO AND FROM BED TO CHAIR: A LITTLE
WALKING IN HOSPITAL ROOM: A LITTLE
MOVING FROM LYING ON BACK TO SITTING ON SIDE OF FLAT BED WITH BEDRAILS: A LOT
MOVING TO AND FROM BED TO CHAIR: A LITTLE
TOILETING: A LITTLE
STANDING UP FROM CHAIR USING ARMS: A LITTLE
DRESSING REGULAR UPPER BODY CLOTHING: A LITTLE
MOVING TO AND FROM BED TO CHAIR: A LITTLE
MOVING FROM LYING ON BACK TO SITTING ON SIDE OF FLAT BED WITH BEDRAILS: A LITTLE
MOBILITY SCORE: 17
DAILY ACTIVITIY SCORE: 17
TURNING FROM BACK TO SIDE WHILE IN FLAT BAD: A LITTLE
EATING MEALS: A LITTLE
STANDING UP FROM CHAIR USING ARMS: A LITTLE
HELP NEEDED FOR BATHING: A LITTLE
DRESSING REGULAR UPPER BODY CLOTHING: A LITTLE
DRESSING REGULAR LOWER BODY CLOTHING: A LOT
CLIMB 3 TO 5 STEPS WITH RAILING: A LOT
PERSONAL GROOMING: A LITTLE
HELP NEEDED FOR BATHING: A LOT
PERSONAL GROOMING: A LITTLE
MOBILITY SCORE: 17
DAILY ACTIVITIY SCORE: 18
HELP NEEDED FOR BATHING: A LITTLE
WALKING IN HOSPITAL ROOM: A LITTLE
TURNING FROM BACK TO SIDE WHILE IN FLAT BAD: A LITTLE
TOILETING: A LITTLE
PATIENT BASELINE BEDBOUND: NO
MOBILITY SCORE: 18
DRESSING REGULAR UPPER BODY CLOTHING: A LITTLE
WALKING IN HOSPITAL ROOM: A LITTLE
CLIMB 3 TO 5 STEPS WITH RAILING: A LITTLE
MOVING FROM LYING ON BACK TO SITTING ON SIDE OF FLAT BED WITH BEDRAILS: A LITTLE
CLIMB 3 TO 5 STEPS WITH RAILING: A LITTLE
EATING MEALS: A LITTLE

## 2024-06-29 ASSESSMENT — ACTIVITIES OF DAILY LIVING (ADL)
BATHING: INDEPENDENT
DRESSING YOURSELF: INDEPENDENT
GROOMING: INDEPENDENT
WALKS IN HOME: INDEPENDENT
LACK_OF_TRANSPORTATION: PATIENT DECLINED
HEARING - RIGHT EAR: FUNCTIONAL
FEEDING YOURSELF: INDEPENDENT
BATHING_ASSISTANCE: MODERATE
HEARING - LEFT EAR: FUNCTIONAL
PATIENT'S MEMORY ADEQUATE TO SAFELY COMPLETE DAILY ACTIVITIES?: YES
JUDGMENT_ADEQUATE_SAFELY_COMPLETE_DAILY_ACTIVITIES: YES
TOILETING: INDEPENDENT
ADEQUATE_TO_COMPLETE_ADL: YES

## 2024-06-29 ASSESSMENT — PAIN DESCRIPTION - ORIENTATION
ORIENTATION: MID
ORIENTATION: MID

## 2024-06-29 ASSESSMENT — LIFESTYLE VARIABLES
AUDIT-C TOTAL SCORE: 0
HOW OFTEN DO YOU HAVE A DRINK CONTAINING ALCOHOL: NEVER
HOW MANY STANDARD DRINKS CONTAINING ALCOHOL DO YOU HAVE ON A TYPICAL DAY: PATIENT DOES NOT DRINK
HOW OFTEN DO YOU HAVE 6 OR MORE DRINKS ON ONE OCCASION: NEVER
AUDIT-C TOTAL SCORE: 0
SKIP TO QUESTIONS 9-10: 1

## 2024-06-29 ASSESSMENT — PATIENT HEALTH QUESTIONNAIRE - PHQ9
1. LITTLE INTEREST OR PLEASURE IN DOING THINGS: NOT AT ALL
SUM OF ALL RESPONSES TO PHQ9 QUESTIONS 1 & 2: 0
2. FEELING DOWN, DEPRESSED OR HOPELESS: NOT AT ALL

## 2024-06-29 NOTE — PROGRESS NOTES
Occupational Therapy    Evaluation    Patient Name: Kamar Garcia  MRN: [id number]  Today's Date: 6/29/2024  Time Calculation  Start Time: 1036  Stop Time: 1050  Time Calculation (min): 14 min        Assessment:  OT Assessment: Pt presents with ADL impairment and deficits with functional mobility/transfers and would benefit from skilled OT to address these deficits.  Prognosis: Good  Barriers to Discharge: None  Evaluation/Treatment Tolerance: Patient tolerated treatment well  Medical Staff Made Aware: Yes  End of Session Communication: Bedside nurse  End of Session Patient Position: Up in chair, Alarm off, not on at start of session  OT Assessment Results: Decreased ADL status, Decreased functional mobility  Prognosis: Good  Barriers to Discharge: None  Evaluation/Treatment Tolerance: Patient tolerated treatment well  Medical Staff Made Aware: Yes  Strengths: Ability to acquire knowledge, Housing layout  Plan:  Treatment Interventions: ADL retraining, Functional transfer training, Endurance training, Equipment evaluation/education, Compensatory technique education  OT Frequency: 3 times per week  OT Discharge Recommendations: Low intensity level of continued care  OT Recommended Transfer Status: Minimal assist, Assist of 2  OT - OK to Discharge: Yes (Pt ok to d/c pending clearance by medical team.)  Treatment Interventions: ADL retraining, Functional transfer training, Endurance training, Equipment evaluation/education, Compensatory technique education    Subjective   Current Problem:  1. Coronary artery disease of native artery of native heart with stable angina pectoris (CMS-HCC)  Anesthesia Intraoperative Transesophageal Echocardiogram    Anesthesia Intraoperative Transesophageal Echocardiogram    Prepare Platelets: 2 Units    Prepare Platelets: 2 Units    Prepare RBC: 4 Units    Prepare RBC: 4 Units      2. Coronary atherosclerosis due to calcified coronary lesion (CODE)  Anesthesia Intraoperative Transesophageal  Echocardiogram        General:  General  Reason for Referral: s/p CABG  Referred By: Lewis  Past Medical History Relevant to Rehab: LINA, HLD, HTN, CAD, CVA with residual L arm weakness and slurred speech, hypothyroidism  Family/Caregiver Present: No  Co-Treatment: PT  Prior to Session Communication: Bedside nurse  Patient Position Received: Up in chair, Alarm off, caregiver present  General Comment: Pt s/p elective CABG x3 6/28/24.  Precautions:  Medical Precautions: Chest tube (tovar catheter, IV, [place]-shadia catheter, chest tubes x2, telemetry)  Post-Surgical Precautions: Move in the Tube  Vital Signs:  Heart Rate:  (75 pre eval, 87 post eval)  Heart Rate Source: Monitor  SpO2:  (92 pre eval, 95 post eval)  BP:  (152/74 pre eval, 140/74 post eval)  BP Method: Arterial line  Pain:  Pain Assessment  Pain Assessment: 0-10  0-10 (Numeric) Pain Score: 7  Pain Type: Surgical pain  Pain Location: Chest  Pain Orientation: Mid    Objective   Cognition:  Overall Cognitive Status: Within Functional Limits  Orientation Level: Oriented X4     Home Living:  Home Living Comments: Pt states he lives alone in a single story home with threshold entry. Basement laundry with handrail on steps. Tub shower, owns seat, no grab bars. Does not own any assistive devices. Reports his neice, sister, and daughter will all be available to give him 24 hour assist for 2 weeks upon his return home.  Prior Function:  Prior Function Comments: PTA pt reports indpendent with all ADLs and IADLs. Denies falls in last 3 months. Drives.    ADL:  Eating Assistance: Modified independent (Device)  Eating Deficit: Setup  Grooming Assistance: Moderate  Grooming Deficit: Increased time to complete, Supervision/safety, Verbal cueing  Bathing Assistance: Moderate  Bathing Deficit: Left lower leg including foot, Right lower leg including foot, Left upper leg, Right upper leg, Buttocks, Perineal area, Increased time to complete , Supervision/safety  UE Dressing  Assistance: Minimal  UE Dressing Deficit: Increased time to complete, Pull down in back, Pull around back, Supervision/safety, Verbal cueing  LE Dressing Assistance: Moderate  LE Dressing Deficit: Thread LLE into underwear, Thread LLE into pants, Thread RLE into pants, Thread RLE into underwear, Pull up over hips, Increased time to complete, Verbal cueing, Supervision/safety  Toileting Assistance with Device: Moderate  Toileting Deficit: Increased time to complete  Functional Assistance: Moderate  Functional Deficit: Increased time to complete, Supervision/safety, Verbal cueing, Steadying  ADL Comments: Pt requires modA for ADL tasks at this time in order to maintain MITT precautions.  Activity Tolerance:  Endurance: Decreased tolerance for upright activites  Bed Mobility/Transfers: Bed Mobility  Bed Mobility: No    Transfers  Transfer: Yes  Transfer 1  Technique 1: Sit to stand, Stand to sit  Transfer Device 1:  (FWW)  Transfer Level of Assistance 1: Minimum assistance  Trials/Comments 1: Min A for lift and balance upon stand. x2 assist for line management.    Sitting Balance:  Static Sitting Balance  Static Sitting-Balance Support: No upper extremity supported, Feet supported  Static Sitting-Level of Assistance: Distant supervision  Static Sitting-Comment/Number of Minutes: Good  Dynamic Sitting Balance  Dynamic Sitting-Balance Support: No upper extremity supported, Feet supported  Dynamic Sitting-Balance: Forward lean  Dynamic Sitting-Comments: Good  Standing Balance:  Static Standing Balance  Static Standing-Balance Support: Bilateral upper extremity supported  Static Standing-Level of Assistance: Minimum assistance  Static Standing-Comment/Number of Minutes: Good -  Dynamic Standing Balance  Dynamic Standing-Balance Support: Bilateral upper extremity supported  Dynamic Standing-Comments: Good -     Sensation:  Sensation Comment: No sensation deficits reported.  Strength:  Strength Comments: [facility] strength WFL.  MMT not completed due to MITT precautions.    Hand Function:  Gross Grasp: Functional  Coordination: Functional  Extremities: RUANGELICA   RUANGELICA : Within Functional Limits (adhering to MITT precautions) and SISSY SHEEHAN: Within Functional Limits (adhering to MITT precautions)      Outcome Measures:[facility] Daily Activity  Putting on and taking off regular lower body clothing: A lot  Bathing (including washing, rinsing, drying): A lot  Putting on and taking off regular upper body clothing: A little  Toileting, which includes using toilet, bedpan or urinal: A little  Taking care of personal grooming such as brushing teeth: A little  Eating Meals: None  Daily Activity - Total Score: 17      Education Documentation  Body Mechanics, taught by Ana Hutchison OT at 6/29/2024  1:04 PM.  Learner: Patient  Readiness: Acceptance  Method: Explanation  Response: Verbalizes Understanding, Needs Reinforcement    Precautions, taught by Ana Hutchison OT at 6/29/2024  1:04 PM.  Learner: Patient  Readiness: Acceptance  Method: Explanation  Response: Verbalizes Understanding, Needs Reinforcement    ADL Training, taught by Ana Hutchison OT at 6/29/2024  1:04 PM.  Learner: Patient  Readiness: Acceptance  Method: Explanation  Response: Verbalizes Understanding, Needs Reinforcement    Education Comments  No comments found.        Goals:  Encounter Problems       Encounter Problems (Active)       OT Goals       Pt will complete LB dressing with Peyman for use of AE.   (Progressing)       Start:  06/29/24    Expected End:  07/13/24            Patient will transfer to bed/chair/toilet with SBA.  (Progressing)       Start:  06/29/24    Expected End:  07/13/24            Pt will tolerate 15 minutes of activity with 1 rest break to improve endurance with I/ADL tasks.  (Progressing)       Start:  06/29/24    Expected End:  07/13/24            Pt will ambulate functional household distance with Peyman of use of FWW to complete I/ADL task.   (Progressing)        Start:  06/29/24    Expected End:  07/13/24

## 2024-06-29 NOTE — PROGRESS NOTES
[facility]       Date:  6/29/2024  Patient:  Kamar Garcia  YOB: 1970  MRN:  [id number]   Admit Date:  6/28/2024  ========================================================================================================      History of Present Illness:  Kamar Garcia is a 53 y.o. year old male patient with Past Medical History of HTN, hypothyroidism, HFrEF (35-40%), HLD, prior CVA was evaluated in June by Dr. Lovell for surgical revascularization. He had stroke in June that prompted a LHC by echocardiogram. His LHC showed multivessel coronary artery disease including his LAD, D1, Circumflex, OM1, OM2 and RCA. In his best interest, patient opted for surgical revascularization.         Interval ICU Events:  6/28: Patient underwent CABG x 3 (LIMA-DIAG-LAD, SVG-OM2), Right Leg EVH and median sternotomy. Cardiopulmonary Bypass Time 118 min, XC time 95 min. Given 2200 ml of crystalloid fluid, 777 Cell Saver. UOP during procedure 1200mls. R and left Pleural Chest Tubes and 1 Mediastinal Chest tubes each with minimal sanguinous drainage. Required x 4 shocks s/p spb. Returned to ICU on nitroglycerin and epinephrine gtt due to low index    6/29: Off epinephrine gtt this am. Up to chair, minimal chest tube output overnight. Pain well controlled. Will plan to start BB this am, along with 40mg IV lasix, will add colchicine as well for pericardial rub. Plan to ambulate patient this am and remove chest tubes if no increase in output afterwards.        Medical History:  Past Medical History:   Diagnosis Date    Coronary artery disease     COVID-19     NOT VACCINATED    Dyslipidemia     HTN (hypertension)     Hyperlipidemia     Hypothyroidism     Stroke (Multi)      Past Surgical History:   Procedure Laterality Date    CARDIAC CATHETERIZATION      HERNIA REPAIR Right     inguinal     Medications Prior to Admission   Medication Sig Dispense Refill Last Dose    aspirin 81 mg EC tablet Take 1 tablet (81  mg) by mouth early in the morning..   6/28/2024 at 0300    atorvastatin (Lipitor) 40 mg tablet Take 1 tablet (40 mg) by mouth early in the morning..   6/27/2024    furosemide (Lasix) 40 mg tablet Take 1 tablet (40 mg) by mouth early in the morning..   6/27/2024    levothyroxine (Synthroid, Levoxyl) 50 mcg tablet Take 1 tablet (50 mcg) by mouth early in the morning..   6/27/2024    lisinopril 10 mg tablet Take 1 tablet (10 mg) by mouth early in the morning..   Past Week    metoprolol succinate XL (Toprol-XL) 50 mg 24 hr tablet Take 1 tablet (50 mg) by mouth early in the morning..   6/28/2024 at 0300    mupirocin (Bactroban) 2 % ointment Apply topically. Apply to both nostrils twice daily 5 days before surgery   6/27/2024     Patient has no known allergies.  Social History     Tobacco Use    Smoking status: Former     Types: Cigarettes    Smokeless tobacco: Former     Types: Snuff   Vaping Use    Vaping status: Never Used   Substance Use Topics    Alcohol use: Never    Drug use: Never     Family History   Problem Relation Name Age of Onset    Pancreatic cancer Mother      COPD Father         Review of Systems:  14 point review of systems was completed and negative except for those specially mention in my HPI    Physical Exam:    Heart Rate:  [68-84]   Temp:  [36.2 °C (97.2 °F)-37.5 °C (99.5 °F)]   Resp:  [14-29]   Weight:  [112 kg (246 lb 0.5 oz)]   SpO2:  [79 %-100 %]     Physical Exam  Vitals reviewed.   Constitutional:       General: He is awake.      Appearance: He is overweight.   HENT:      Head: Normocephalic and atraumatic.   Cardiovascular:      Rate and Rhythm: Normal rate and regular rhythm.      Pulses: Normal pulses.      Heart sounds: Heart sounds not distant. No murmur heard.     Friction rub present.   Pulmonary:      Effort: Pulmonary effort is normal.      Breath sounds: Examination of the right-upper field reveals rhonchi. Examination of the left-upper field reveals rhonchi. Rhonchi present.       Comments: 1 mediastinal chest tube, 1 R and L pleural Chest tube with minimal serosanginous drainage  Chest:      Comments: Medial Sternotomy Incision CDI   Abdominal:      General: Abdomen is flat. Bowel sounds are decreased.      Palpations: Abdomen is soft.      Tenderness: There is no abdominal tenderness.   Musculoskeletal:      Cervical back: Full passive range of motion without pain.      Right lower le+ Pitting Edema present.      Left lower le+ Pitting Edema present.   Skin:     General: Skin is warm.      Capillary Refill: Capillary refill takes less than 2 seconds.      Comments: EvH CDI    Neurological:      Mental Status: He is alert and oriented to person, place, and time.   Psychiatric:         Behavior: Behavior is cooperative.         Objective:    Electrocardiogram 12-lead PRN for arrhythmia    Result Date: 2024  Normal sinus rhythm Possible Left atrial enlargement Inferior infarct , age undetermined Abnormal ECG When compared with ECG of 2024 14:04, No significant change was found    ECG 12 Lead    Result Date: 2024  Normal sinus rhythm Normal ECG When compared with ECG of 2024 23:29, (unconfirmed) No significant change was found    ECG 12 Lead    Result Date: 2024  Normal sinus rhythm Prolonged QT Abnormal ECG No previous ECGs available Confirmed by Alexa Jimenez ([id number]) on 2024 6:19:22 PM    XR chest 1 view    Result Date: 2024  Interpreted By:  Schoenberger, Joseph, STUDY: XR CHEST 1 VIEW;  2024 2:31 pm   INDICATION: Signs/Symptoms:s/p cabg.   COMPARISON: Exam performed at 7:46 a.m.   ACCESSION NUMBER(S): [id number]   ORDERING CLINICIAN: BEN JURADO   FINDINGS: In the interval since the prior, the patient has undergone a median sternotomy. The Hesperus-Mohamud catheter tip may have advanced somewhat and is likely in the proximal left pulmonary artery. There is an endotracheal tube with its tip near the level of thoracic inlet 4.8 cm above the  francisca. The nasogastric tube is noted the with its distal tip below the gastroesophageal junction only slightly and likely should be advanced. There is a midline chest drain and left basal chest tube without evidence for pneumothorax.       CARDIOMEDIASTINAL SILHOUETTE: Cardiac silhouette is enlarged possibly exaggerated due to hypoventilatory exam and AP projection. No venous congestion.   LUNGS: There is new subsegmental atelectasis in the left lung base retrocardiac region. A very small amount left pleural effusion is a consideration.   ABDOMEN: No remarkable upper abdominal findings.   BONES: No acute osseous changes.       1.  Expected postoperative findings after median sternotomy and open heart surgery. See detailed discussion above. 2. The nasogastric tube only projects slightly below the gastroesophageal junction and should be advanced.       MACRO: None   Signed by: Joseph Schoenberger 6/28/2024 2:46 PM Dictation workstation:   [id number]    Anesthesia Intraoperative Transesophageal Echocardiogram    Result Date: 6/28/2024          [address] [address]  Tel [phone] Fax [phone] TRANSESOPHAGEAL ECHOCARDIOGRAM REPORT Patient Name:      ERWIN Gibbs Physician:    [id number] Anh Dhillon MD Study Date:        6/28/2024           Ordering Provider:    [id number] KENN HERNANDEZ MRN/PID:           [id number]            Fellow: Accession#:        [id number]        Nurse: Date of Birth/Age: 1970 / 53     Sonographer:          MD                    years Gender:            M                   Additional Staff: Height:            175.26 cm           Admit Date: Weight:            111.59 kg           Admission Status: BSA / BMI:         2.26 m2 / 36.33     Department Location:                    [facility] Study Type:    ANESTHESIA  INTRAOPERATIVE RENÉE Diagnosis/ICD: Coronary atherosclerosis due to calcified coronary lesion-I25.84  Study Detail: The following Echo studies were performed: 2D, M-Mode, Doppler and               color flow.  PHYSICIAN INTERPRETATION: RENÉE Details: Color flow Doppler echo was performed to assess for the presence of a patent foramen ovale. RENÉE Medication: The patient was sedated by Anesthesia; please refer to anesthesia flow sheet for medications used. RENÉE Procedure: The probe was passed without difficulty. Left Ventricle: The left ventricular systolic function is mildly decreased, with a visually estimated ejection fraction of 40-45%. Wall motion is abnormal. The left ventricular cavity size is normal. Spectral Doppler shows an impaired relaxation pattern of left ventricular diastolic filling. Left Atrium: The left atrium is normal in size. There is no evidence of a patent foramen ovale. There is no mass visualized in the left atrial appendage and there is no thrombus visualized in the left atrial appendage. Right Ventricle: The right ventricle is normal in size. There is normal right ventricular global systolic function. Right Atrium: The right atrium is normal in size. Aortic Valve: The aortic valve appears structurally normal. There is no evidence of aortic valve regurgitation. The peak instantaneous gradient of the aortic valve is 7.4 mmHg. The mean gradient of the aortic valve is 3.0 mmHg. Mitral Valve: The mitral valve is normal in structure. There is trace mitral valve regurgitation. Tricuspid Valve: The tricuspid valve is structurally normal. There is trace tricuspid regurgitation. Pulmonic Valve: The pulmonic valve is structurally normal. There is trace pulmonic valve regurgitation. Pericardium: There is no pericardial effusion noted. Aorta: The aortic root is normal.  CONCLUSIONS:  1. The left ventricular systolic function is mildly decreased, with a visually estimated ejection fraction of 40-45%.  2.  Spectral Doppler shows an impaired relaxation pattern of left ventricular diastolic filling.  3. There is normal right ventricular global systolic function.  4. Impaired relaxation with reversal of the E/A ratio.  5. Initially EF value is between 40 to 50%, postoperatively more in the 50%.  6. Pre-bypass that was noticeable inferior hypokinesia, but that was improved post bypass. QUANTITATIVE DATA SUMMARY: M-MODE MEASUREMENTS:                 Normal Ranges: IVSd:   3.55 cm (0.6-1.1cm) LVIDd:  5.34 cm (3.9-5.9cm) LVIDs:  3.94 cm LV % FS 26.3 % LV SYSTOLIC FUNCTION BY 2D PLANIMETRY (MOD):                      Normal Ranges: EF-A4C View:    50 % (>=55%) EF-Visual:      43 % LV EF Reported: 43 % LV DIASTOLIC FUNCTION:                         Normal Ranges: MV e'         0.210 m/s (>8.0) MV lateral e' 0.27 m/s MV medial e'  0.15 m/s AORTIC VALVE:                       Normal Ranges: AoV Vmax:    1.36 m/s (<=1.7m/s) AoV Peak P.4 mmHg (<20mmHg) AoV Mean PG: 3.0 mmHg (1.7-11.5mmHg) AoV VTI:     29.80 cm (18-25cm)  [id number] Troy Dhillon MD Electronically signed on 2024 at 2:09:04 PM  ** Final **     XR chest 1 view    Result Date: 2024  Interpreted By:  Gui Hernandez, STUDY: XR CHEST 1 VIEW;  2024 7:51 am   INDICATION: Signs/Symptoms:post swan placement.   COMPARISON: CT chest without contrast 2024   ACCESSION NUMBER(S): [id number]   ORDERING CLINICIAN: TROY DHILLON   TECHNIQUE: Single frontal view of the chest; Portable technique   FINDINGS: New right IJ approach pulmonary artery line tip is flipped projecting partially either anteriorly or posteriorly, overlies distal pulmonary outflow tract   No pneumothorax   No acute airspace disease   No large pleural effusion   The cardiomediastinal silhouette is unchanged       No pneumothorax with right IJ approach central line tip projecting over distal pulmonary outflow tract   MACRO: None   Signed by: Gui Hernandez 2024 8:19 AM Dictation workstation:    [id number]      Results for orders placed or performed during the hospital encounter of 06/28/24 (from the past 24 hour(s))   Blood Gas Arterial Full Panel Unsolicited   Result Value Ref Range    POCT pH, Arterial 7.28 (L) 7.38 - 7.42 pH    POCT pCO2, Arterial 59 (H) 38 - 42 mm Hg    POCT pO2, Arterial 358 (H) 85 - 95 mm Hg    POCT SO2, Arterial 100 94 - 100 %    POCT Oxy Hemoglobin, Arterial 97.7 94.0 - 98.0 %    POCT Hematocrit Calculated, Arterial 42.0 41.0 - 52.0 %    POCT Sodium, Arterial 131 (L) 136 - 145 mmol/L    POCT Potassium, Arterial 5.0 3.5 - 5.3 mmol/L    POCT Chloride, Arterial 102 98 - 107 mmol/L    POCT Ionized Calcium, Arterial 1.17 1.10 - 1.33 mmol/L    POCT Glucose, Arterial 145 (H) 74 - 99 mg/dL    POCT Lactate, Arterial 1.8 0.4 - 2.0 mmol/L    POCT Base Excess, Arterial -0.3 -2.0 - 3.0 mmol/L    POCT HCO3 Calculated, Arterial 27.7 (H) 22.0 - 26.0 mmol/L    POCT Hemoglobin, Arterial 13.9 13.5 - 17.5 g/dL    POCT Anion Gap, Arterial 6 (L) 10 - 25 mmo/L    Patient Temperature      FiO2 80 %   Coox Panel, Arterial Unsolicited   Result Value Ref Range    POCT Hemoglobin, Arterial 13.9 13.5 - 17.5 g/dL    POCT Oxy Hemoglobin, Arterial 97.7 94.0 - 98.0 %    POCT Carboxyhemoglobin, Arterial 1.4 %    POCT Methemoglobin, Arterial 0.9 0.0 - 1.5 %    POCT Deoxy Hemoglobin, Arterial 0.0 0.0 - 5.0 %   Blood Gas Venous Full Panel Unsolicited   Result Value Ref Range    POCT pH, Venous 7.26 (L) 7.33 - 7.43 pH    POCT pCO2, Venous 63 (H) 41 - 51 mm Hg    POCT pO2, Venous 63 (H) 35 - 45 mm Hg    POCT SO2, Venous 89 (H) 45 - 75 %    POCT Oxy Hemoglobin, Venous 87.1 (H) 45.0 - 75.0 %    POCT Hematocrit Calculated, Venous 41.0 41.0 - 52.0 %    POCT Sodium, Venous 131 (L) 136 - 145 mmol/L    POCT Potassium, Venous 5.2 3.5 - 5.3 mmol/L    POCT Chloride, Venous 102 98 - 107 mmol/L    POCT Ionized Calicum, Venous 1.20 1.10 - 1.33 mmol/L    POCT Glucose, Venous 144 (H) 74 - 99 mg/dL    POCT Lactate, Venous 1.8 0.4 -  2.0 mmol/L    POCT Base Excess, Venous -0.3 -2.0 - 3.0 mmol/L    POCT HCO3 Calculated, Venous 28.3 (H) 22.0 - 26.0 mmol/L    POCT Hemoglobin, Venous 13.8 13.5 - 17.5 g/dL    POCT Anion Gap, Venous 6.0 (L) 10.0 - 25.0 mmol/L    Patient Temperature      FiO2 80 %   Blood Gas Arterial Full Panel Unsolicited   Result Value Ref Range    POCT pH, Arterial 7.39 7.38 - 7.42 pH    POCT pCO2, Arterial 43 (H) 38 - 42 mm Hg    POCT pO2, Arterial 308 (H) 85 - 95 mm Hg    POCT SO2, Arterial 100 94 - 100 %    POCT Oxy Hemoglobin, Arterial 97.4 94.0 - 98.0 %    POCT Hematocrit Calculated, Arterial 43.0 41.0 - 52.0 %    POCT Sodium, Arterial 131 (L) 136 - 145 mmol/L    POCT Potassium, Arterial 5.5 (H) 3.5 - 5.3 mmol/L    POCT Chloride, Arterial 102 98 - 107 mmol/L    POCT Ionized Calcium, Arterial 1.17 1.10 - 1.33 mmol/L    POCT Glucose, Arterial 122 (H) 74 - 99 mg/dL    POCT Lactate, Arterial 1.5 0.4 - 2.0 mmol/L    POCT Base Excess, Arterial 0.7 -2.0 - 3.0 mmol/L    POCT HCO3 Calculated, Arterial 26.0 22.0 - 26.0 mmol/L    POCT Hemoglobin, Arterial 14.2 13.5 - 17.5 g/dL    POCT Anion Gap, Arterial 9 (L) 10 - 25 mmo/L    Patient Temperature      FiO2 80 %   Coox Panel, Arterial Unsolicited   Result Value Ref Range    POCT Hemoglobin, Arterial 14.2 13.5 - 17.5 g/dL    POCT Oxy Hemoglobin, Arterial 97.4 94.0 - 98.0 %    POCT Carboxyhemoglobin, Arterial 1.4 %    POCT Methemoglobin, Arterial 1.2 0.0 - 1.5 %    POCT Deoxy Hemoglobin, Arterial 0.0 0.0 - 5.0 %   Blood Gas Arterial Full Panel Unsolicited   Result Value Ref Range    POCT pH, Arterial 7.41 7.38 - 7.42 pH    POCT pCO2, Arterial 40 38 - 42 mm Hg    POCT pO2, Arterial 400 (H) 85 - 95 mm Hg    POCT SO2, Arterial 100 94 - 100 %    POCT Oxy Hemoglobin, Arterial 97.2 94.0 - 98.0 %    POCT Hematocrit Calculated, Arterial 42.0 41.0 - 52.0 %    POCT Sodium, Arterial 130 (L) 136 - 145 mmol/L    POCT Potassium, Arterial 5.1 3.5 - 5.3 mmol/L    POCT Chloride, Arterial 103 98 - 107 mmol/L     POCT Ionized Calcium, Arterial 1.15 1.10 - 1.33 mmol/L    POCT Glucose, Arterial 131 (H) 74 - 99 mg/dL    POCT Lactate, Arterial 1.5 0.4 - 2.0 mmol/L    POCT Base Excess, Arterial 0.7 -2.0 - 3.0 mmol/L    POCT HCO3 Calculated, Arterial 25.4 22.0 - 26.0 mmol/L    POCT Hemoglobin, Arterial 14.1 13.5 - 17.5 g/dL    POCT Anion Gap, Arterial 7 (L) 10 - 25 mmo/L    Patient Temperature      FiO2 90 %   Coox Panel, Arterial Unsolicited   Result Value Ref Range    POCT Hemoglobin, Arterial 14.1 13.5 - 17.5 g/dL    POCT Oxy Hemoglobin, Arterial 97.2 94.0 - 98.0 %    POCT Carboxyhemoglobin, Arterial 1.6 %    POCT Methemoglobin, Arterial 1.2 0.0 - 1.5 %    POCT Deoxy Hemoglobin, Arterial 0.0 0.0 - 5.0 %   Blood Gas Arterial Full Panel Unsolicited   Result Value Ref Range    POCT pH, Arterial 7.38 7.38 - 7.42 pH    POCT pCO2, Arterial 43 (H) 38 - 42 mm Hg    POCT pO2, Arterial 246 (H) 85 - 95 mm Hg    POCT SO2, Arterial 100 94 - 100 %    POCT Oxy Hemoglobin, Arterial 97.8 94.0 - 98.0 %    POCT Hematocrit Calculated, Arterial 43.0 41.0 - 52.0 %    POCT Sodium, Arterial 131 (L) 136 - 145 mmol/L    POCT Potassium, Arterial 4.4 3.5 - 5.3 mmol/L    POCT Chloride, Arterial 107 98 - 107 mmol/L    POCT Ionized Calcium, Arterial 1.42 (H) 1.10 - 1.33 mmol/L    POCT Glucose, Arterial 132 (H) 74 - 99 mg/dL    POCT Lactate, Arterial 1.9 0.4 - 2.0 mmol/L    POCT Base Excess, Arterial 0.0 -2.0 - 3.0 mmol/L    POCT HCO3 Calculated, Arterial 25.4 22.0 - 26.0 mmol/L    POCT Hemoglobin, Arterial 14.4 13.5 - 17.5 g/dL    POCT Anion Gap, Arterial 3 (L) 10 - 25 mmo/L    Patient Temperature      FiO2 100 %   Coox Panel, Arterial Unsolicited   Result Value Ref Range    POCT Hemoglobin, Arterial 14.4 13.5 - 17.5 g/dL    POCT Oxy Hemoglobin, Arterial 97.8 94.0 - 98.0 %    POCT Carboxyhemoglobin, Arterial 1.2 %    POCT Methemoglobin, Arterial 1.0 0.0 - 1.5 %    POCT Deoxy Hemoglobin, Arterial 0.0 0.0 - 5.0 %   Magnesium   Result Value Ref Range     Magnesium 3.93 (H) 1.60 - 2.40 mg/dL   Coagulation Screen   Result Value Ref Range    Protime 12.0 9.8 - 12.8 seconds    INR 1.1 0.9 - 1.1    aPTT 30 27 - 38 seconds   Fibrinogen   Result Value Ref Range    Fibrinogen 243 200 - 400 mg/dL   CBC   Result Value Ref Range    WBC 22.4 (H) 4.4 - 11.3 x10*3/uL    nRBC 0.0 0.0 - 0.0 /100 WBCs    RBC 5.71 4.50 - 5.90 x10*6/uL    Hemoglobin 15.9 13.5 - 17.5 g/dL    Hematocrit 47.2 41.0 - 52.0 %    MCV 83 80 - 100 fL    MCH 27.8 26.0 - 34.0 pg    MCHC 33.7 32.0 - 36.0 g/dL    RDW 13.5 11.5 - 14.5 %    Platelets 168 150 - 450 x10*3/uL   Renal Function Panel   Result Value Ref Range    Glucose 121 (H) 74 - 99 mg/dL    Sodium 137 136 - 145 mmol/L    Potassium 4.6 3.5 - 5.3 mmol/L    Chloride 105 98 - 107 mmol/L    Bicarbonate 24 21 - 32 mmol/L    Anion Gap 13 10 - 20 mmol/L    Urea Nitrogen 21 6 - 23 mg/dL    Creatinine 1.08 0.50 - 1.30 mg/dL    eGFR 82 >60 mL/min/1.73m*2    Calcium 9.2 8.6 - 10.3 mg/dL    Phosphorus 4.7 2.5 - 4.9 mg/dL    Albumin 3.6 3.4 - 5.0 g/dL   Basic Metabolic Panel   Result Value Ref Range    Glucose 121 (H) 74 - 99 mg/dL    Sodium 137 136 - 145 mmol/L    Potassium 4.6 3.5 - 5.3 mmol/L    Chloride 105 98 - 107 mmol/L    Bicarbonate 24 21 - 32 mmol/L    Anion Gap 13 10 - 20 mmol/L    Urea Nitrogen 21 6 - 23 mg/dL    Creatinine 1.08 0.50 - 1.30 mg/dL    eGFR 82 >60 mL/min/1.73m*2    Calcium 9.2 8.6 - 10.3 mg/dL   POCT GLUCOSE   Result Value Ref Range    POCT Glucose 131 (H) 74 - 99 mg/dL   ECG 12 Lead   Result Value Ref Range    Ventricular Rate 78 BPM    Atrial Rate 78 BPM    ME Interval 194 ms    QRS Duration 112 ms    QT Interval 440 ms    QTC Calculation(Bazett) 501 ms    P Axis 55 degrees    R Axis 50 degrees    T Axis 38 degrees    QRS Count 13 beats    Q Onset 207 ms    P Onset 110 ms    P Offset 183 ms    T Offset 427 ms    QTC Fredericia 480 ms   Blood gas arterial   Result Value Ref Range    POCT pH, Arterial 7.26 (L) 7.38 - 7.42 pH    POCT pCO2,  Arterial 53 (H) 38 - 42 mm Hg    POCT pO2, Arterial 216 (H) 85 - 95 mm Hg    POCT SO2, Arterial 100 94 - 100 %    POCT Oxy Hemoglobin, Arterial 97.8 94.0 - 98.0 %    POCT Base Excess, Arterial -4.2 (L) -2.0 - 3.0 mmol/L    POCT HCO3 Calculated, Arterial 23.8 22.0 - 26.0 mmol/L    Patient Temperature 37.0 degrees Celsius    FiO2 100 %   Blood Gas Arterial Full Panel   Result Value Ref Range    POCT pH, Arterial 7.26 (L) 7.38 - 7.42 pH    POCT pCO2, Arterial 53 (H) 38 - 42 mm Hg    POCT pO2, Arterial 216 (H) 85 - 95 mm Hg    POCT SO2, Arterial 100 94 - 100 %    POCT Oxy Hemoglobin, Arterial 97.8 94.0 - 98.0 %    POCT Hematocrit Calculated, Arterial 50.0 41.0 - 52.0 %    POCT Sodium, Arterial 132 (L) 136 - 145 mmol/L    POCT Potassium, Arterial 4.8 3.5 - 5.3 mmol/L    POCT Chloride, Arterial 102 98 - 107 mmol/L    POCT Ionized Calcium, Arterial 1.24 1.10 - 1.33 mmol/L    POCT Glucose, Arterial 126 (H) 74 - 99 mg/dL    POCT Lactate, Arterial 1.9 0.4 - 2.0 mmol/L    POCT Base Excess, Arterial -4.2 (L) -2.0 - 3.0 mmol/L    POCT HCO3 Calculated, Arterial 23.8 22.0 - 26.0 mmol/L    POCT Hemoglobin, Arterial 16.7 13.5 - 17.5 g/dL    POCT Anion Gap, Arterial 11 10 - 25 mmo/L    Patient Temperature 37.0 degrees Celsius    FiO2 100 %   POCT GLUCOSE   Result Value Ref Range    POCT Glucose 159 (H) 74 - 99 mg/dL   POCT GLUCOSE   Result Value Ref Range    POCT Glucose 156 (H) 74 - 99 mg/dL   Electrocardiogram 12-lead PRN for arrhythmia   Result Value Ref Range    Ventricular Rate 74 BPM    Atrial Rate 74 BPM    UT Interval 158 ms    QRS Duration 98 ms    QT Interval 428 ms    QTC Calculation(Bazett) 475 ms    P Axis 41 degrees    R Axis 2 degrees    T Axis 26 degrees    QRS Count 12 beats    Q Onset 211 ms    P Onset 132 ms    P Offset 192 ms    T Offset 425 ms    QTC Fredericia 459 ms   Calcium, Ionized   Result Value Ref Range    POCT Calcium, Ionized 1.15 1.1 - 1.33 mmol/L   Magnesium   Result Value Ref Range    Magnesium 2.40  1.60 - 2.40 mg/dL   CBC   Result Value Ref Range    WBC 14.0 (H) 4.4 - 11.3 x10*3/uL    nRBC 0.0 0.0 - 0.0 /100 WBCs    RBC 5.15 4.50 - 5.90 x10*6/uL    Hemoglobin 14.3 13.5 - 17.5 g/dL    Hematocrit 42.8 41.0 - 52.0 %    MCV 83 80 - 100 fL    MCH 27.8 26.0 - 34.0 pg    MCHC 33.4 32.0 - 36.0 g/dL    RDW 13.6 11.5 - 14.5 %    Platelets 204 150 - 450 x10*3/uL   Renal Function Panel   Result Value Ref Range    Glucose 139 (H) 74 - 99 mg/dL    Sodium 134 (L) 136 - 145 mmol/L    Potassium 4.8 3.5 - 5.3 mmol/L    Chloride 103 98 - 107 mmol/L    Bicarbonate 23 21 - 32 mmol/L    Anion Gap 13 10 - 20 mmol/L    Urea Nitrogen 20 6 - 23 mg/dL    Creatinine 1.15 0.50 - 1.30 mg/dL    eGFR 76 >60 mL/min/1.73m*2    Calcium 8.8 8.6 - 10.3 mg/dL    Phosphorus 4.4 2.5 - 4.9 mg/dL    Albumin 3.7 3.4 - 5.0 g/dL   POCT GLUCOSE   Result Value Ref Range    POCT Glucose 144 (H) 74 - 99 mg/dL   ECG 12 Lead   Result Value Ref Range    Ventricular Rate 68 BPM    Atrial Rate 68 BPM    KS Interval 152 ms    QRS Duration 110 ms    QT Interval 430 ms    QTC Calculation(Bazett) 457 ms    P Axis 38 degrees    R Axis 1 degrees    T Axis 55 degrees    QRS Count 11 beats    Q Onset 210 ms    P Onset 134 ms    P Offset 194 ms    T Offset 425 ms    QTC Fredericia 448 ms   POCT GLUCOSE   Result Value Ref Range    POCT Glucose 127 (H) 74 - 99 mg/dL   Assessment/Plan:     I am currently managing this critically ill patient for the following problems:     Neuro/Psych/Pain Ctrl/Sedation:  #Post-Op Pain  #Hx CVA  -Neuro checks, CAM Assessment, Delirium precautions  -PRN Dilaudid, oxycodone, Tylenol for pain control  -Avoid Toradol on POD 0, can consider POD 1  -Lidocaine patches     Respiratory/ENT:  #Post-Op Respiratory Insufficiency  -Wean supplemental oxygen for spO2 goal > 92%  -ABG as needed  -Daily CXR     Cardiovascular:  #Triple Vessel CAD s/p CABG x 3  Patient underwent CABG x 3 (LIMA-DIAG-LAD, SVG-OM2), Right Leg EVH and median sternotomy.   -V wires  "in place not being paced   -2 pleural, 1 mediastinal chest tube, maintain to wall suction, monitor output, notify if > 100cc/hr, plan to ambulate and remove today if no increase in output   -Volume resuscitation postoperatively with 250cc boluses LR as needed and albumin 5%  -Remove [place] today   -Daily BMP, keep K >4, Mg > 2  -Start aspirin and statin POD #0  -Will give 40 IV lasix, start metoprolol 25 mg BID and add colchicine for pericardial rub   -Roxana-op Ancef x 48 hours  -PT/OT consult and OOB POD #1  -CVS following and appreciate further management     GI:  -PPI     Renal/Volume Status (Intra & Extravascular):  No acute issues  No CKD at baseline   Monitor RFP    Endocrine  #Hyperglycemia  -No history of DM, likely stress induced  -Strict blood glucose control post-operatively  -Glucose goal   -Insulin gtt vs SSI per ICU protocol     Infectious Disease:  -Perioperative Ancef x 48 hours     Heme/Onc:  #Acute Blood Loss Anemia Post-Op  -Baseline Hgb 14.0  -Daily CBC  -Transfuse for Hgb goal > 7 or massive blood loss with hemodynamic instability     ORTHO/MSK:  -PT/OT, Cardiac Rehab POD #1     Ethics/Code Status:  FULL CODE     :  DVT Prophylaxis: Holding  GI Prophylaxis: PO PPI  Bowel Regimen: Docusate, Senna, Miralax  Diet: Clear Liquid Diet   CVC: RIJ CVC,   Modesta: Yes, (right radial )  Gonzalez: Remove   Restraints: yes  Dispo: ICU      [facility] Cardiothoracic Surgery/ICU Quality Check      New Onset Organ Failure (TIN, Shock, ALI etc): none  >2 Vasopressors/Inotropes (Levophed > 0.1mcg/kg/min + Vasopressin) for more than 8 hours: none  Ongoing Blood Product Transfusion: none  Sepsis/New Infection: none  Delirium: none  Readmission to ICU: n/a  Family/Patient Concerns: none    If any \"yes\" to the above, action plan as noted below:    None      Critical Care Time: 45 min    Plan Discussed with Dr. Osborn and CTS     Bia ANDRE, CNP  Critical Care Medicine   [facility]  "

## 2024-06-29 NOTE — PROGRESS NOTES
Kamar Garcia is a 53 y.o. male on day 1 of admission presenting with Coronary artery disease of native artery of native heart with stable angina pectoris (CMS-HCC).    Subjective   Mr. Garcia is a 53 year old male with PMHx of HTN, HLD, ICM (EF 35-40%), CAD, hypothyroidism, and recent right frontal and parietal cerebral infarct. Echocardiogram was obtained as part of stroke work up which revealed moderately reduced LV function with EF 35-40%. Pt subsequently underwent coronary angiogram revealing multivessel coronary artery disease and pt referred to cardiac surgery for consideration of surgical revascularization.     On June 28th, 2024 pt presented to [facility] and underwent CABG x3 with LIMA-Diag-LAD, SVG-OM2; endoscopic harvest of R great saphenous vein; mediansternotomy. CPB time 118 min. XC time 95 min. Received 2200 ml of crystalloid fluid, 777 mL Cell Saver. UOP during procedure 1200mls. R and left Pleural Chest Tubes and 1 Mediastinal Chest tubes each with sanguinous drainage. Pt did fibrillate coming off CPB requiring defib x4 with restoration of sinus rhythm. Pt arrived to SICU on EPI and Nitroglycerine drips, intubated and paralyzed, in critical but stable condition. Pt was weaned from ventilator and extubated to nasal canula a few hour after arriving to ICU. Nitoglycerine and EPI weaned off prior to extubation.     POD#1: Pt is afebrile. Maintaining sinus rhythm. Normotensive. Beta blockade resumed. Maintaining adequate oxygen saturation on room air at rest and with activity. CXR reviewed by NP; fair inspiratory volume with bibasilar atelectasis; gaseous distention also noted and Simethicone ordered. Pt reports pain to be controlled. Chest tubes with tapering serosanguinous output; plan to remove once output <30 mL/hr. He is tolerating CL diet without nausea and will advance to cardiac diet. Excellent urine output overnight. LOS FB +3.6L and will begin diuresis with Lasix 40mg IVP x1; further adjustments  "based on response. Pt ambulated with nurses and tolerated well; therapy consulted. There were no acute overnight events.         Objective     Physical Exam  Vitals and nursing note reviewed.   Constitutional:       General: He is not in acute distress.     Appearance: Normal appearance. He is obese.   HENT:      Head: Normocephalic and atraumatic.      Right Ear: External ear normal.      Left Ear: External ear normal.      Nose: Nose normal.      Mouth/Throat:      Mouth: Mucous membranes are dry.      Pharynx: Oropharynx is clear.   Eyes:      Extraocular Movements: Extraocular movements intact.      Pupils: Pupils are equal, round, and reactive to light.   Neck:      Comments: MICHAELLE saldaña and fanny  Cardiovascular:      Rate and Rhythm: Normal rate and regular rhythm.      Pulses: Normal pulses.      Heart sounds:      Friction rub present.   Pulmonary:      Comments: Spirometry volumes 500-750mL  Slightly diminished air exchange at bases with coarse crackles LLL  Mediastinal and bilateral pleural chest tubes with tapering output and no air leak  Island dressing to sternotomy CDI; sternum stable.  Abdominal:      General: There is no distension.      Tenderness: There is no abdominal tenderness.      Comments: Protuberant but soft. +BS throughout.    Genitourinary:     Comments: Gonzalez with clear straw colored urine  Musculoskeletal:      Cervical back: Normal range of motion.      Comments: Generalized post op weakness  1-2+ lower extremity edema    Skin:     General: Skin is warm and dry.   Neurological:      General: No focal deficit present.      Mental Status: He is alert and oriented to person, place, and time.   Psychiatric:         Mood and Affect: Mood normal.         Behavior: Behavior normal.         Last Recorded Vitals  Blood pressure 169/75, pulse 82, temperature 37.1 °C (98.8 °F), temperature source Core, resp. rate (!) 28, height 1.753 m (5' 9\"), weight 112 kg (246 lb 0.5 oz), SpO2 " 94%.  Intake/Output last 3 Shifts:  I/O last 3 completed shifts:  In: 8016.1 (71.8 mL/kg) [P.O.:1680; I.V.:3679.1 (33 mL/kg); Blood:777; IV Piggyback:1880]  Out: 4468 (40 mL/kg) [Urine:3575 (0.9 mL/kg/hr); Blood:500; Chest Tube:393]  Weight: 111.6 kg     Relevant Results  Scheduled medications  acetaminophen, 650 mg, oral, q6h  aspirin, 81 mg, oral, Daily  atorvastatin, 40 mg, oral, Nightly  ceFAZolin, 2 g, intravenous, q8h  colchicine, 0.6 mg, oral, Daily  docusate sodium, 100 mg, oral, TID  insulin lispro, 0-15 Units, subcutaneous, Before meals & nightly  levothyroxine, 50 mcg, oral, Daily  methocarbamol, 500 mg, oral, q8h CLAUDIA  metoprolol tartrate, 25 mg, oral, BID  mupirocin, 0.5 Application, Topical, BID  oxygen, , inhalation, Continuous - Inhalation  polyethylene glycol, 17 g, oral, BID      Continuous medications     PRN medications  PRN medications: bisacodyl, calcium chloride, calcium chloride, dextrose **OR** glucagon, HYDROmorphone, magnesium hydroxide, magnesium sulfate, magnesium sulfate, metoclopramide **OR** metoclopramide, naloxone, ondansetron ODT **OR** ondansetron, oxyCODONE, oxyCODONE, potassium chloride CR, potassium chloride CR    Results for orders placed or performed during the hospital encounter of 06/28/24 (from the past 24 hour(s))   Blood Gas Arterial Full Panel Unsolicited   Result Value Ref Range    POCT pH, Arterial 7.41 7.38 - 7.42 pH    POCT pCO2, Arterial 40 38 - 42 mm Hg    POCT pO2, Arterial 400 (H) 85 - 95 mm Hg    POCT SO2, Arterial 100 94 - 100 %    POCT Oxy Hemoglobin, Arterial 97.2 94.0 - 98.0 %    POCT Hematocrit Calculated, Arterial 42.0 41.0 - 52.0 %    POCT Sodium, Arterial 130 (L) 136 - 145 mmol/L    POCT Potassium, Arterial 5.1 3.5 - 5.3 mmol/L    POCT Chloride, Arterial 103 98 - 107 mmol/L    POCT Ionized Calcium, Arterial 1.15 1.10 - 1.33 mmol/L    POCT Glucose, Arterial 131 (H) 74 - 99 mg/dL    POCT Lactate, Arterial 1.5 0.4 - 2.0 mmol/L    POCT Base Excess, Arterial  0.7 -2.0 - 3.0 mmol/L    POCT HCO3 Calculated, Arterial 25.4 22.0 - 26.0 mmol/L    POCT Hemoglobin, Arterial 14.1 13.5 - 17.5 g/dL    POCT Anion Gap, Arterial 7 (L) 10 - 25 mmo/L    Patient Temperature      FiO2 90 %   Coox Panel, Arterial Unsolicited   Result Value Ref Range    POCT Hemoglobin, Arterial 14.1 13.5 - 17.5 g/dL    POCT Oxy Hemoglobin, Arterial 97.2 94.0 - 98.0 %    POCT Carboxyhemoglobin, Arterial 1.6 %    POCT Methemoglobin, Arterial 1.2 0.0 - 1.5 %    POCT Deoxy Hemoglobin, Arterial 0.0 0.0 - 5.0 %   Blood Gas Arterial Full Panel Unsolicited   Result Value Ref Range    POCT pH, Arterial 7.38 7.38 - 7.42 pH    POCT pCO2, Arterial 43 (H) 38 - 42 mm Hg    POCT pO2, Arterial 246 (H) 85 - 95 mm Hg    POCT SO2, Arterial 100 94 - 100 %    POCT Oxy Hemoglobin, Arterial 97.8 94.0 - 98.0 %    POCT Hematocrit Calculated, Arterial 43.0 41.0 - 52.0 %    POCT Sodium, Arterial 131 (L) 136 - 145 mmol/L    POCT Potassium, Arterial 4.4 3.5 - 5.3 mmol/L    POCT Chloride, Arterial 107 98 - 107 mmol/L    POCT Ionized Calcium, Arterial 1.42 (H) 1.10 - 1.33 mmol/L    POCT Glucose, Arterial 132 (H) 74 - 99 mg/dL    POCT Lactate, Arterial 1.9 0.4 - 2.0 mmol/L    POCT Base Excess, Arterial 0.0 -2.0 - 3.0 mmol/L    POCT HCO3 Calculated, Arterial 25.4 22.0 - 26.0 mmol/L    POCT Hemoglobin, Arterial 14.4 13.5 - 17.5 g/dL    POCT Anion Gap, Arterial 3 (L) 10 - 25 mmo/L    Patient Temperature      FiO2 100 %   Coox Panel, Arterial Unsolicited   Result Value Ref Range    POCT Hemoglobin, Arterial 14.4 13.5 - 17.5 g/dL    POCT Oxy Hemoglobin, Arterial 97.8 94.0 - 98.0 %    POCT Carboxyhemoglobin, Arterial 1.2 %    POCT Methemoglobin, Arterial 1.0 0.0 - 1.5 %    POCT Deoxy Hemoglobin, Arterial 0.0 0.0 - 5.0 %   Magnesium   Result Value Ref Range    Magnesium 3.93 (H) 1.60 - 2.40 mg/dL   Coagulation Screen   Result Value Ref Range    Protime 12.0 9.8 - 12.8 seconds    INR 1.1 0.9 - 1.1    aPTT 30 27 - 38 seconds   Fibrinogen   Result  Value Ref Range    Fibrinogen 243 200 - 400 mg/dL   CBC   Result Value Ref Range    WBC 22.4 (H) 4.4 - 11.3 x10*3/uL    nRBC 0.0 0.0 - 0.0 /100 WBCs    RBC 5.71 4.50 - 5.90 x10*6/uL    Hemoglobin 15.9 13.5 - 17.5 g/dL    Hematocrit 47.2 41.0 - 52.0 %    MCV 83 80 - 100 fL    MCH 27.8 26.0 - 34.0 pg    MCHC 33.7 32.0 - 36.0 g/dL    RDW 13.5 11.5 - 14.5 %    Platelets 168 150 - 450 x10*3/uL   Renal Function Panel   Result Value Ref Range    Glucose 121 (H) 74 - 99 mg/dL    Sodium 137 136 - 145 mmol/L    Potassium 4.6 3.5 - 5.3 mmol/L    Chloride 105 98 - 107 mmol/L    Bicarbonate 24 21 - 32 mmol/L    Anion Gap 13 10 - 20 mmol/L    Urea Nitrogen 21 6 - 23 mg/dL    Creatinine 1.08 0.50 - 1.30 mg/dL    eGFR 82 >60 mL/min/1.73m*2    Calcium 9.2 8.6 - 10.3 mg/dL    Phosphorus 4.7 2.5 - 4.9 mg/dL    Albumin 3.6 3.4 - 5.0 g/dL   Basic Metabolic Panel   Result Value Ref Range    Glucose 121 (H) 74 - 99 mg/dL    Sodium 137 136 - 145 mmol/L    Potassium 4.6 3.5 - 5.3 mmol/L    Chloride 105 98 - 107 mmol/L    Bicarbonate 24 21 - 32 mmol/L    Anion Gap 13 10 - 20 mmol/L    Urea Nitrogen 21 6 - 23 mg/dL    Creatinine 1.08 0.50 - 1.30 mg/dL    eGFR 82 >60 mL/min/1.73m*2    Calcium 9.2 8.6 - 10.3 mg/dL   POCT GLUCOSE   Result Value Ref Range    POCT Glucose 131 (H) 74 - 99 mg/dL   ECG 12 Lead   Result Value Ref Range    Ventricular Rate 78 BPM    Atrial Rate 78 BPM    LA Interval 194 ms    QRS Duration 112 ms    QT Interval 440 ms    QTC Calculation(Bazett) 501 ms    P Axis 55 degrees    R Axis 50 degrees    T Axis 38 degrees    QRS Count 13 beats    Q Onset 207 ms    P Onset 110 ms    P Offset 183 ms    T Offset 427 ms    QTC Fredericia 480 ms   Blood gas arterial   Result Value Ref Range    POCT pH, Arterial 7.26 (L) 7.38 - 7.42 pH    POCT pCO2, Arterial 53 (H) 38 - 42 mm Hg    POCT pO2, Arterial 216 (H) 85 - 95 mm Hg    POCT SO2, Arterial 100 94 - 100 %    POCT Oxy Hemoglobin, Arterial 97.8 94.0 - 98.0 %    POCT Base Excess,  Arterial -4.2 (L) -2.0 - 3.0 mmol/L    POCT HCO3 Calculated, Arterial 23.8 22.0 - 26.0 mmol/L    Patient Temperature 37.0 degrees Celsius    FiO2 100 %   Blood Gas Arterial Full Panel   Result Value Ref Range    POCT pH, Arterial 7.26 (L) 7.38 - 7.42 pH    POCT pCO2, Arterial 53 (H) 38 - 42 mm Hg    POCT pO2, Arterial 216 (H) 85 - 95 mm Hg    POCT SO2, Arterial 100 94 - 100 %    POCT Oxy Hemoglobin, Arterial 97.8 94.0 - 98.0 %    POCT Hematocrit Calculated, Arterial 50.0 41.0 - 52.0 %    POCT Sodium, Arterial 132 (L) 136 - 145 mmol/L    POCT Potassium, Arterial 4.8 3.5 - 5.3 mmol/L    POCT Chloride, Arterial 102 98 - 107 mmol/L    POCT Ionized Calcium, Arterial 1.24 1.10 - 1.33 mmol/L    POCT Glucose, Arterial 126 (H) 74 - 99 mg/dL    POCT Lactate, Arterial 1.9 0.4 - 2.0 mmol/L    POCT Base Excess, Arterial -4.2 (L) -2.0 - 3.0 mmol/L    POCT HCO3 Calculated, Arterial 23.8 22.0 - 26.0 mmol/L    POCT Hemoglobin, Arterial 16.7 13.5 - 17.5 g/dL    POCT Anion Gap, Arterial 11 10 - 25 mmo/L    Patient Temperature 37.0 degrees Celsius    FiO2 100 %   POCT GLUCOSE   Result Value Ref Range    POCT Glucose 159 (H) 74 - 99 mg/dL   POCT GLUCOSE   Result Value Ref Range    POCT Glucose 156 (H) 74 - 99 mg/dL   Electrocardiogram 12-lead PRN for arrhythmia   Result Value Ref Range    Ventricular Rate 74 BPM    Atrial Rate 74 BPM    MA Interval 158 ms    QRS Duration 98 ms    QT Interval 428 ms    QTC Calculation(Bazett) 475 ms    P Axis 41 degrees    R Axis 2 degrees    T Axis 26 degrees    QRS Count 12 beats    Q Onset 211 ms    P Onset 132 ms    P Offset 192 ms    T Offset 425 ms    QTC Fredericia 459 ms   Calcium, Ionized   Result Value Ref Range    POCT Calcium, Ionized 1.15 1.1 - 1.33 mmol/L   Magnesium   Result Value Ref Range    Magnesium 2.40 1.60 - 2.40 mg/dL   CBC   Result Value Ref Range    WBC 14.0 (H) 4.4 - 11.3 x10*3/uL    nRBC 0.0 0.0 - 0.0 /100 WBCs    RBC 5.15 4.50 - 5.90 x10*6/uL    Hemoglobin 14.3 13.5 - 17.5  g/dL    Hematocrit 42.8 41.0 - 52.0 %    MCV 83 80 - 100 fL    MCH 27.8 26.0 - 34.0 pg    MCHC 33.4 32.0 - 36.0 g/dL    RDW 13.6 11.5 - 14.5 %    Platelets 204 150 - 450 x10*3/uL   Renal Function Panel   Result Value Ref Range    Glucose 139 (H) 74 - 99 mg/dL    Sodium 134 (L) 136 - 145 mmol/L    Potassium 4.8 3.5 - 5.3 mmol/L    Chloride 103 98 - 107 mmol/L    Bicarbonate 23 21 - 32 mmol/L    Anion Gap 13 10 - 20 mmol/L    Urea Nitrogen 20 6 - 23 mg/dL    Creatinine 1.15 0.50 - 1.30 mg/dL    eGFR 76 >60 mL/min/1.73m*2    Calcium 8.8 8.6 - 10.3 mg/dL    Phosphorus 4.4 2.5 - 4.9 mg/dL    Albumin 3.7 3.4 - 5.0 g/dL   POCT GLUCOSE   Result Value Ref Range    POCT Glucose 144 (H) 74 - 99 mg/dL   ECG 12 Lead   Result Value Ref Range    Ventricular Rate 68 BPM    Atrial Rate 68 BPM    AZ Interval 152 ms    QRS Duration 110 ms    QT Interval 430 ms    QTC Calculation(Bazett) 457 ms    P Axis 38 degrees    R Axis 1 degrees    T Axis 55 degrees    QRS Count 11 beats    Q Onset 210 ms    P Onset 134 ms    P Offset 194 ms    T Offset 425 ms    QTC Fredericia 448 ms   POCT GLUCOSE   Result Value Ref Range    POCT Glucose 127 (H) 74 - 99 mg/dL     XR chest 1 view    Result Date: 6/29/2024  Interpreted By:  Jignesh Cortez, STUDY: XR CHEST 1 VIEW;  6/29/2024 10:53 am   INDICATION: Signs/Symptoms:s/p CABG.   COMPARISON: 06/28/2024.   ACCESSION NUMBER(S): [id number]   ORDERING CLINICIAN: KENN HERNANDEZ   FINDINGS: CARDIOMEDIASTINAL SILHOUETTE: Endotracheal tube and NG tube are no longer seen. A [place]-Mohamud catheter from right jugular approach remains in place and is visualized to the right atrium level. The distal segment of the Paxico-Mohamud catheter including the tip is not well visualized due to obscuration by overlying median sternotomy hardware. A cephalad directed probable mediastinal drain remains in place. Cardiomegaly, aortic prominence with calcification and postoperative changes of the mediastinum are again seen.   LUNGS:  Inspiratory volume is low. Left basilar chest tube remains in place. Left basilar atelectasis versus small infiltrate is present. No appreciable pneumothorax.   ABDOMEN: No remarkable upper abdominal findings.   BONES: Bones are stable.       1.  Postoperative changes of the mediastinum with residual life-support lines and tubes as detailed. Distal segment of the [place]-Mohamud catheter including the tip are not well visualized due to obscuration from the median sternotomy hardware. 2. Low inspiratory volume with left basilar atelectasis versus infiltrate.       MACRO: None.   Signed by: Jignesh Cortez 6/29/2024 10:59 AM Dictation workstation:   [id number]    Electrocardiogram 12-lead PRN for arrhythmia    Result Date: 6/29/2024  Normal sinus rhythm Possible Left atrial enlargement Inferior infarct , age undetermined Abnormal ECG When compared with ECG of 28-JUN-2024 14:04, No significant change was found    ECG 12 Lead    Result Date: 6/29/2024  Normal sinus rhythm Normal ECG When compared with ECG of 28-JUN-2024 23:29, (unconfirmed) No significant change was found    ECG 12 Lead    Result Date: 6/28/2024  Normal sinus rhythm Prolonged QT Abnormal ECG No previous ECGs available Confirmed by Alexa Jimenez ([id number]) on 6/28/2024 6:19:22 PM    XR chest 1 view    Result Date: 6/28/2024  Interpreted By:  Schoenberger, Joseph, STUDY: XR CHEST 1 VIEW;  6/28/2024 2:31 pm   INDICATION: Signs/Symptoms:s/p cabg.   COMPARISON: Exam performed at 7:46 a.m.   ACCESSION NUMBER(S): [id number]   ORDERING CLINICIAN: BEN JURADO   FINDINGS: In the interval since the prior, the patient has undergone a median sternotomy. The [place]-Mohamud catheter tip may have advanced somewhat and is likely in the proximal left pulmonary artery. There is an endotracheal tube with its tip near the level of thoracic inlet 4.8 cm above the francisca. The nasogastric tube is noted the with its distal tip below the gastroesophageal junction only slightly and  likely should be advanced. There is a midline chest drain and left basal chest tube without evidence for pneumothorax.       CARDIOMEDIASTINAL SILHOUETTE: Cardiac silhouette is enlarged possibly exaggerated due to hypoventilatory exam and AP projection. No venous congestion.   LUNGS: There is new subsegmental atelectasis in the left lung base retrocardiac region. A very small amount left pleural effusion is a consideration.   ABDOMEN: No remarkable upper abdominal findings.   BONES: No acute osseous changes.       1.  Expected postoperative findings after median sternotomy and open heart surgery. See detailed discussion above. 2. The nasogastric tube only projects slightly below the gastroesophageal junction and should be advanced.       MACRO: None   Signed by: Joseph Schoenberger 6/28/2024 2:46 PM Dictation workstation:   [id number]    Anesthesia Intraoperative Transesophageal Echocardiogram    Result Date: 6/28/2024          [place] [id number]  Tel [phone] Fax [phone] TRANSESOPHAGEAL ECHOCARDIOGRAM REPORT Patient Name:      ERWIN Gibbs Physician:    [id number] Anh Dhillon MD Study Date:        6/28/2024           Ordering Provider:    [id number] KENN HERNANDEZ MRN/PID:           [id number]            Fellow: Accession#:        [id number]        Nurse: Date of Birth/Age: 1970 / 53     Sonographer:          MD barclay Gender:            M                   Additional Staff: Height:            175.26 cm           Admit Date: Weight:            111.59 kg           Admission Status: BSA / BMI:         2.26 m2 / 36.33     Department Location:                    / Study Type:    ANESTHESIA INTRAOPERATIVE RENÉE Diagnosis/ICD: Coronary atherosclerosis due to calcified coronary lesion-I25.84  Study Detail: The following  Echo studies were performed: 2D, M-Mode, Doppler and               color flow.  PHYSICIAN INTERPRETATION: RENÉE Details: Color flow Doppler echo was performed to assess for the presence of a patent foramen ovale. RENÉE Medication: The patient was sedated by Anesthesia; please refer to anesthesia flow sheet for medications used. RENÉE Procedure: The probe was passed without difficulty. Left Ventricle: The left ventricular systolic function is mildly decreased, with a visually estimated ejection fraction of 40-45%. Wall motion is abnormal. The left ventricular cavity size is normal. Spectral Doppler shows an impaired relaxation pattern of left ventricular diastolic filling. Left Atrium: The left atrium is normal in size. There is no evidence of a patent foramen ovale. There is no mass visualized in the left atrial appendage and there is no thrombus visualized in the left atrial appendage. Right Ventricle: The right ventricle is normal in size. There is normal right ventricular global systolic function. Right Atrium: The right atrium is normal in size. Aortic Valve: The aortic valve appears structurally normal. There is no evidence of aortic valve regurgitation. The peak instantaneous gradient of the aortic valve is 7.4 mmHg. The mean gradient of the aortic valve is 3.0 mmHg. Mitral Valve: The mitral valve is normal in structure. There is trace mitral valve regurgitation. Tricuspid Valve: The tricuspid valve is structurally normal. There is trace tricuspid regurgitation. Pulmonic Valve: The pulmonic valve is structurally normal. There is trace pulmonic valve regurgitation. Pericardium: There is no pericardial effusion noted. Aorta: The aortic root is normal.  CONCLUSIONS:  1. The left ventricular systolic function is mildly decreased, with a visually estimated ejection fraction of 40-45%.  2. Spectral Doppler shows an impaired relaxation pattern of left ventricular diastolic filling.  3. There is normal right ventricular  global systolic function.  4. Impaired relaxation with reversal of the E/A ratio.  5. Initially EF value is between 40 to 50%, postoperatively more in the 50%.  6. Pre-bypass that was noticeable inferior hypokinesia, but that was improved post bypass. QUANTITATIVE DATA SUMMARY: M-MODE MEASUREMENTS:                 Normal Ranges: IVSd:   3.55 cm (0.6-1.1cm) LVIDd:  5.34 cm (3.9-5.9cm) LVIDs:  3.94 cm LV % FS 26.3 % LV SYSTOLIC FUNCTION BY 2D PLANIMETRY (MOD):                      Normal Ranges: EF-A4C View:    50 % (>=55%) EF-Visual:      43 % LV EF Reported: 43 % LV DIASTOLIC FUNCTION:                         Normal Ranges: MV e'         0.210 m/s (>8.0) MV lateral e' 0.27 m/s MV medial e'  0.15 m/s AORTIC VALVE:                       Normal Ranges: AoV Vmax:    1.36 m/s (<=1.7m/s) AoV Peak P.4 mmHg (<20mmHg) AoV Mean PG: 3.0 mmHg (1.7-11.5mmHg) AoV VTI:     29.80 cm (18-25cm)  [id number] Troy Dhillon MD Electronically signed on 2024 at 2:09:04 PM  ** Final **     XR chest 1 view    Result Date: 2024  Interpreted By:  Gui Hernandez, STUDY: XR CHEST 1 VIEW;  2024 7:51 am   INDICATION: Signs/Symptoms:post swan placement.   COMPARISON: CT chest without contrast 2024   ACCESSION NUMBER(S): [id number]   ORDERING CLINICIAN: RTOY DHILLON   TECHNIQUE: Single frontal view of the chest; Portable technique   FINDINGS: New right IJ approach pulmonary artery line tip is flipped projecting partially either anteriorly or posteriorly, overlies distal pulmonary outflow tract   No pneumothorax   No acute airspace disease   No large pleural effusion   The cardiomediastinal silhouette is unchanged       No pneumothorax with right IJ approach central line tip projecting over distal pulmonary outflow tract   MACRO: None   Signed by: Gui Hernandez 2024 8:19 AM Dictation workstation:   [id number]       This patient has a urinary catheter   Reason for the urinary catheter remaining today? Urine catheter  unnecessary, will be removed today               Assessment/Plan   Principal Problem:    Coronary artery disease of native artery of native heart with stable angina pectoris (CMS-HCC)    CAD; ICM  During work up for CVA, echo revealed moderately reduced LV function with EF 35-40%. Pt subsequently underwent LHC revealing multivessel CAD and was referred for surgical revascularization.  June 28th, 2024 pt presented to [facility] and underwent CABG x3 with LIMA-Diag-LAD, SVG-OM2; endoscopic harvest of R great saphenous vein; mediansternotomy  -Discussed with Dr Isaac Martin and Dr. Lovell as well as critical care team  -Remove swan and arterial line  -Remove chest tubes once output less than 30mL/hr. May initiate sub-q Heparin for DVT ppx once removed  -Epicardial wires to remain in place; may insulate as pt has not required pacing  -Remove Gonzalez this afternoon; continue strict I&O  -ASA and Statin daily  -Metoprolol 25mg BID; optimize as hemodynamics allow  -Lasix 40mg IVPx1; further adjustments based on response.  -Keep K>4 and Mag>2;  PRN replacement as ordered   -Multimodal pain regimen as ordered  -Bowel regimen of Miralax and Colace  -Cardiac diet. Protonix for GI ppx   -Encourage IS. Supplemental oxygen as need for SPO2>90%  -Increase activity as tolerated; ambulate TID; OOB for all meals. Therapy consulted  - for DC planning  -CBC, BMP, Mag, CXR, and EKG in AM  -will remain in ICU today for close monitoring. Hopeful to downgrade to telemetry tomorrow.    Pericarditis  Post operative pericarditis as evidenced by pericardial rub and mild leukocytosis.  -Colchicine BID  -follow EKG    Hx of CVA  Presented to ED in June c/o left sided weakness. MRI confirmed acute/subacute infarcts R frontal and parietal lobes.  -ASA and Statin daily  -therapy consulted    HTN; HLD  -Metoprolol 25mg BID; optimize as hemodynamics allow  -Statin daily   -appreciate assistance of Cardiology colleagues     Hypothyroidism  -Continue home  Synthroid     I spent 60 minutes in the professional and overall care of this patient.      Noelle Feng, APRN-CNP

## 2024-06-29 NOTE — PROGRESS NOTES
Physical Therapy    Physical Therapy Evaluation    Patient Name: Kamar Garcia  MRN: [id number]  Today's Date: 6/29/2024   Time Calculation  Start Time: 1036  Stop Time: 1050  Time Calculation (min): 14 min  11/11-A    Assessment/Plan   PT Assessment  PT Assessment Results: Decreased strength, Decreased endurance, Impaired balance, Decreased mobility, Decreased safety awareness, Pain  Rehab Prognosis: Good  End of Session Communication: Bedside nurse  Assessment Comment: Pt presents with decreased functional mobility secondary to weakness, decreased balance, decreased safety awareness, and decreased activity tolerance. Pt will benefit from skilled PT during hospital stay to address above deficits.  End of Session Patient Position: Up in chair, Alarm off, not on at start of session  IP OR SWING BED PT PLAN  Inpatient or Swing Bed: Inpatient  PT Plan  Treatment/Interventions: Bed mobility, Transfer training, Gait training, Balance training, Neuromuscular re-education, Strengthening, Endurance training, Range of motion, Therapeutic exercise, Therapeutic activity, Home exercise program  PT Plan: Ongoing PT  PT Frequency: 4 times per week  PT Discharge Recommendations: Low intensity level of continued care  PT Recommended Transfer Status: Assist x1, Assistive device  PT - OK to Discharge: Yes (PT eval complete, ok to d/c once deemed medically appropriate.)    Subjective     Current Problem:  1. Coronary artery disease of native artery of native heart with stable angina pectoris (CMS-HCC)  Anesthesia Intraoperative Transesophageal Echocardiogram    Anesthesia Intraoperative Transesophageal Echocardiogram    Prepare Platelets: 2 Units    Prepare Platelets: 2 Units    Prepare RBC: 4 Units    Prepare RBC: 4 Units      2. Coronary atherosclerosis due to calcified coronary lesion (CODE)  Anesthesia Intraoperative Transesophageal Echocardiogram        Patient Active Problem List   Diagnosis    HTN (hypertension)    Dyslipidemia     CVA (cerebral vascular accident) (Multi)    LINA (obstructive sleep apnea)    Coronary artery disease of native artery of native heart with stable angina pectoris (CMS-HCC)    Cerebrovascular accident (CVA) due to thrombosis of right middle cerebral artery (Multi)    Cardiomyopathy (Multi)    Heart failure (Multi)    Hypothyroidism       General Visit Information:  General  Reason for Referral: s/p CABG  Referred By: Lewis  Past Medical History Relevant to Rehab: LINA, HLD, HTN, CAD, CVA with residual L arm weakness and slurred speech, hypothyroidism  Family/Caregiver Present: No  Co-Treatment: OT  Prior to Session Communication: Bedside nurse  Patient Position Received: Up in chair, Alarm off, caregiver present  General Comment: Pt s/p elective CABG x3 6/28/24.    Home Living:  Home Living  Home Living Comments: Pt states he lives alone in a single story home with threshold entry. Basement laundry with handrail on steps. Tub shower, owns seat, no grab bars. Does not own any assistive devices. Reports his neice, sister, and daughter will all be available to give him 24 hour assist for 2 weeks upon his return home.    Prior Level of Function:  Prior Function Per Pt/Caregiver Report  Prior Function Comments: PTA pt reports indpendent with all ADLs and IADLs. Denies falls in last 3 months. Drives.    Precautions:  Precautions  Medical Precautions: Chest tube (tovar catheter, IV, [place]-shadia catheter, chest tubes x2, telemetry)  Post-Surgical Precautions: Move in the Tube    Vital Signs:  Vital Signs  Heart Rate:  (75 pre-eval, 87 post eval)  SpO2:  (92 pre eval, 95 post eval)  BP:  (152/74 pre eval, 140/74 post eval)  BP Method: Arterial line  Objective     Pain:  Pain Assessment  Pain Assessment: 0-10  0-10 (Numeric) Pain Score: 7  Pain Type: Surgical pain  Pain Location: Chest  Pain Orientation: Mid    Cognition:  Cognition  Overall Cognitive Status: Within Functional Limits  Orientation Level: Oriented X4    General  Assessments:      Activity Tolerance  Endurance: Decreased tolerance for upright activites     Strength  Strength Comments: BLE MMT 4/5 overall           Static Sitting Balance  Static Sitting-Comment/Number of Minutes: Good  Dynamic Sitting Balance  Dynamic Sitting-Comments: Good  Static Standing Balance  Static Standing-Comment/Number of Minutes: Fair  Dynamic Standing Balance  Dynamic Standing-Comments: Fair    Functional Assessments:     Bed Mobility  Bed Mobility: No  Transfers  Transfer: Yes  Transfer 1  Technique 1: Sit to stand, Stand to sit  Transfer Device 1:  (FWW)  Transfer Level of Assistance 1: Minimum assistance  Trials/Comments 1: Min A for lift and balance upon stand. x2 assist for line management.  Ambulation/Gait Training  Ambulation/Gait Training Performed: Yes  Ambulation/Gait Training 1  Surface 1: Level tile  Device 1: Rolling walker  Assistance 1: Minimum assistance  Quality of Gait 1: Forward flexed posture  Comments/Distance (ft) 1: Pt ambulates ~4' fwd and 4' retro at recliner chair. Min A for balance and line management.          Extremity/Trunk Assessments:        RLE   RLE : Within Functional Limits  LLE   LLE : Within Functional Limits    Outcome Measures:     [facility] Basic Mobility  Turning from your back to your side while in a flat bed without using bedrails: A lot  Moving from lying on your back to sitting on the side of a flat bed without using bedrails: A little  Moving to and from bed to chair (including a wheelchair): A little  Standing up from a chair using your arms (e.g. wheelchair or bedside chair): A little  To walk in hospital room: A little  Climbing 3-5 steps with railing: A little  Basic Mobility - Total Score: 17                                        Goals:  Encounter Problems       Encounter Problems (Active)       PT Problem       Pt will demonstrate sup > sit and sit > sup bed mobility mod I (Progressing)       Start:  06/29/24    Expected End:  07/13/24             Pt will demo sit > stand and stand > sit transfer with LRD and mod I vs independent  (Progressing)       Start:  06/29/24    Expected End:  07/13/24            Pt will ambulate 50' with LRD and mod I vs independent, without LOB  (Progressing)       Start:  06/29/24    Expected End:  07/13/24            Pt will verbalize and demonstrate understanding of MITT precautions throughout therapy treatment (Progressing)       Start:  06/29/24    Expected End:  07/13/24               Pain - Adult            Education Documentation  Precautions, taught by Tiffanie Rouse PT at 6/29/2024 11:51 AM.  Learner: Patient  Readiness: Acceptance  Method: Explanation  Response: Needs Reinforcement  Comment: Educated pt on PT POC, MITT precautions, and current recommendation for assistive device    Mobility Training, taught by Tiffanie Rouse PT at 6/29/2024 11:51 AM.  Learner: Patient  Readiness: Acceptance  Method: Explanation  Response: Needs Reinforcement  Comment: Educated pt on PT POC, MITT precautions, and current recommendation for assistive device

## 2024-06-29 NOTE — CONSULTS
Inpatient consult to Cardiology  Consult performed by: Viktor Gonzalez MD  Consult ordered by: Adrianna Saucedo, APRN-CNP      Cardiology Consult Note      Date:   6/29/2024  Patient name:  Kamar Garcia  Date of admission:  6/28/2024  5:12 AM  MRN:   [id number]  YOB: 1970  Time of Consult:  11:32 AM    Consulting Cardiologist: Dr. Viktor Gonzalez          Admission Diagnosis:     Coronary artery disease of native artery of native heart with stable angina pectoris (CMS-[facility])      History of Present Illness:      Kamar Garcia is a 53 y.o.  male patient 53-year-old gentleman with a history of hypertension, hypothyroidism, HFrEF with EF of 35 to 40%, dyslipidemia, prior CVA and coronary artery disease with a cardiac catheterization that shows severe triple-vessel disease s/p CABG x 3 on 6/28 with a LIMA to the diagonal-LAD, vein graft to OM 2.  Patient is postop day 1, sitting up in chair with intact chest tubes with minimal drainage.  Is currently off pressors and appears mildly volume overloaded, awaiting IV Lasix per ICU team.      Past Medical History:     Past Medical History:   Diagnosis Date    Coronary artery disease     COVID-19     NOT VACCINATED    Dyslipidemia     HTN (hypertension)     Hyperlipidemia     Hypothyroidism     Stroke (Multi)        Past Surgical History:     Past Surgical History:   Procedure Laterality Date    CARDIAC CATHETERIZATION      HERNIA REPAIR Right     inguinal       Family History:     Family History   Problem Relation Name Age of Onset    Pancreatic cancer Mother      COPD Father         Social History:     Social History     Tobacco Use    Smoking status: Former     Types: Cigarettes    Smokeless tobacco: Former     Types: Snuff   Vaping Use    Vaping status: Never Used   Substance Use Topics    Alcohol use: Never    Drug use: Never       Allergies:     No Known Allergies    CURRENT HOSPITAL MEDICATIONS    acetaminophen, 650 mg, oral, q6h  aspirin, 81 mg,  oral, Daily  atorvastatin, 40 mg, oral, Nightly  ceFAZolin, 2 g, intravenous, q8h  colchicine, 0.6 mg, oral, Daily  docusate sodium, 100 mg, oral, TID  insulin lispro, 0-15 Units, subcutaneous, Before meals & nightly  levothyroxine, 50 mcg, oral, Daily  methocarbamol, 500 mg, oral, q8h CLAUDIA  metoprolol tartrate, 25 mg, oral, BID  mupirocin, 0.5 Application, Topical, BID  oxygen, , inhalation, Continuous - Inhalation  [START ON 6/30/2024] pantoprazole, 40 mg, oral, Daily before breakfast   Or  [START ON 6/30/2024] pantoprazole, 40 mg, intravenous, Daily before breakfast  polyethylene glycol, 17 g, oral, BID  simethicone, 80 mg, oral, 4x daily         Current Outpatient Medications   Medication Instructions    aspirin 81 mg EC tablet 1 tablet, oral, Daily (0630)    atorvastatin (LIPITOR) 40 mg, oral, Daily (0630)    furosemide (LASIX) 40 mg, oral, Daily (0630)    levothyroxine (SYNTHROID, LEVOXYL) 50 mcg, oral, Daily (0630)    lisinopril 10 mg, oral, Daily (0630)    metoprolol succinate XL (TOPROL-XL) 50 mg, oral, Daily (0630)    mupirocin (Bactroban) 2 % ointment Topical, Apply to both nostrils twice daily 5 days before surgery        Review of Systems:      12 point review of systems was obtained in detail and is negative other than that detailed above.    Vital Signs:     Vitals:    06/29/24 0800 06/29/24 0900 06/29/24 0943 06/29/24 1000   BP:       Pulse: 74 74 78 82   Resp: 24 20 26 (!) 28   Temp: 37.1 °C (98.8 °F)      TempSrc: Core      SpO2: 99% 97% 96% 94%   Weight:       Height:           Intake/Output Summary (Last 24 hours) at 6/29/2024 1132  Last data filed at 6/29/2024 0800  Gross per 24 hour   Intake 6672.06 ml   Output 4012 ml   Net 2660.06 ml       Wt Readings from Last 4 Encounters:   06/29/24 112 kg (246 lb 0.5 oz)   06/11/24 112 kg (246 lb)       Physical Examination:     GENERAL APPEARANCE: Well developed, well nourished, in no acute distress.  CHEST: Symmetric and non-tender.  INTEGUMENT: Skin  warm and dry, without gross excoriationis or lesions.  HEENT: No gross abnormalities of conjunctiva, teeth, gums, oral mucosa  NECK: Supple, no JVD, no bruit. Thyroid not palpable. Carotid upstrokes normal.  NEURO/PSHCY: Alert and oriented x3; appropriate behavior and responses and responses, grossly normal cerebellar function with normal balance and coordination  LUNGS: Reduced breath sounds at the lung bases bilaterally.    HEART: Regular first and second sound without prominent pericardial rub across the precordium.  ABDOMEN: Soft, nontender, no palpable hepatosplenomegaly, no mases, no bruits. Abdominal aorta not noted to be enlarged.  MUSCULOSKELETAL: Ambulatory with normal tandem gait.  EXTREMITIES: Warm with good color, no clubbing or cyanois. There is no edema noted.  PERIPHERAL VASCULAR: Pulses present and equally palpable; 2+ throughout. No femoral bruits.      Lab:     CBC:   Lab Results   Component Value Date    WBC 14.0 (H) 06/29/2024    RBC 5.15 06/29/2024    HGB 14.3 06/29/2024    HCT 42.8 06/29/2024     06/29/2024        CMP:    Lab Results   Component Value Date     (L) 06/29/2024    K 4.8 06/29/2024     06/29/2024    CO2 23 06/29/2024    BUN 20 06/29/2024    CREATININE 1.15 06/29/2024    GLUCOSE 139 (H) 06/29/2024    CALCIUM 8.8 06/29/2024       Magnesium:    Lab Results   Component Value Date    MG 2.40 06/29/2024         PT/INR:    Lab Results   Component Value Date    PROTIME 12.0 06/28/2024    INR 1.1 06/28/2024       BMP:  Lab Results   Component Value Date     (L) 06/29/2024     06/28/2024     06/28/2024    K 4.8 06/29/2024    K 4.6 06/28/2024    K 4.6 06/28/2024     06/29/2024     06/28/2024     06/28/2024    CO2 23 06/29/2024    CO2 24 06/28/2024    CO2 24 06/28/2024    BUN 20 06/29/2024    BUN 21 06/28/2024    BUN 21 06/28/2024    CREATININE 1.15 06/29/2024    CREATININE 1.08 06/28/2024    CREATININE 1.08 06/28/2024       CBC:  Lab  Results   Component Value Date    WBC 14.0 (H) 06/29/2024    WBC 22.4 (H) 06/28/2024    RBC 5.15 06/29/2024    RBC 5.71 06/28/2024    HGB 14.3 06/29/2024    HGB 15.9 06/28/2024    HCT 42.8 06/29/2024    HCT 47.2 06/28/2024    MCV 83 06/29/2024    MCV 83 06/28/2024    MCH 27.8 06/29/2024    MCH 27.8 06/28/2024    MCHC 33.4 06/29/2024    MCHC 33.7 06/28/2024    RDW 13.6 06/29/2024    RDW 13.5 06/28/2024     06/29/2024     06/28/2024         Diagnostic Studies:       Result Date: 6/29/2024  Normal sinus rhythm Possible Left atrial enlargement Inferior infarct , age undetermined Abnormal ECG When compared with ECG of 28-JUN-2024 14:04, No significant change was found        Anesthesia Intraoperative Transesophageal Echocardiogram    Result Date: 6/28/2024          [place] [address]  Tel [phone] Fax [phone] TRANSESOPHAGEAL ECHOCARDIOGRAM REPORT Patient Name:      ERWIN Gibbs Physician:    [id number] Anh Dhillon MD Study Date:        6/28/2024           Ordering Provider:    [id number] KENN HERNANDEZ MRN/PID:           [id number]            Fellow: Accession#:        [id number]        Nurse: Date of Birth/Age: 1970 / 53     Sonographer:          MD                    years Gender:            M                   Additional Staff: Height:            175.26 cm           Admit Date: Weight:            111.59 kg           Admission Status: BSA / BMI:         2.26 m2 / 36.33     Department Location:                    kg/m2 Study Type:    ANESTHESIA INTRAOPERATIVE RENÉE Diagnosis/ICD: Coronary atherosclerosis due to calcified coronary lesion-I25.84  Study Detail: The following Echo studies were performed: 2D, M-Mode, Doppler and               color flow.  PHYSICIAN INTERPRETATION: RENÉE Details: Color flow Doppler echo was performed to  assess for the presence of a patent foramen ovale. RENÉE Medication: The patient was sedated by Anesthesia; please refer to anesthesia flow sheet for medications used. RENÉE Procedure: The probe was passed without difficulty. Left Ventricle: The left ventricular systolic function is mildly decreased, with a visually estimated ejection fraction of 40-45%. Wall motion is abnormal. The left ventricular cavity size is normal. Spectral Doppler shows an impaired relaxation pattern of left ventricular diastolic filling. Left Atrium: The left atrium is normal in size. There is no evidence of a patent foramen ovale. There is no mass visualized in the left atrial appendage and there is no thrombus visualized in the left atrial appendage. Right Ventricle: The right ventricle is normal in size. There is normal right ventricular global systolic function. Right Atrium: The right atrium is normal in size. Aortic Valve: The aortic valve appears structurally normal. There is no evidence of aortic valve regurgitation. The peak instantaneous gradient of the aortic valve is 7.4 mmHg. The mean gradient of the aortic valve is 3.0 mmHg. Mitral Valve: The mitral valve is normal in structure. There is trace mitral valve regurgitation. Tricuspid Valve: The tricuspid valve is structurally normal. There is trace tricuspid regurgitation. Pulmonic Valve: The pulmonic valve is structurally normal. There is trace pulmonic valve regurgitation. Pericardium: There is no pericardial effusion noted. Aorta: The aortic root is normal.      CONCLUSIONS:  1. The left ventricular systolic function is mildly decreased, with a visually estimated ejection fraction of 40-45%.  2. Spectral Doppler shows an impaired relaxation pattern of left ventricular diastolic filling.  3. There is normal right ventricular global systolic function.  4. Impaired relaxation with reversal of the E/A ratio.  5. Initially EF value is between 40 to 50%, postoperatively more in the 50%.   6. Pre-bypass that was noticeable inferior hypokinesia, but that was improved post bypass.     XR chest 1 view    Result Date: 6/28/2024  Interpreted By:  Gui Hernandez, STUDY: XR CHEST 1 VIEW;  6/28/2024 7:51 am   INDICATION: Signs/Symptoms:post swan placement.   COMPARISON: CT chest without contrast 19 June 2024   ACCESSION NUMBER(S): [id number]   ORDERING CLINICIAN: TROY ELLIS   TECHNIQUE: Single frontal view of the chest; Portable technique   FINDINGS: New right IJ approach pulmonary artery line tip is flipped projecting partially either anteriorly or posteriorly, overlies distal pulmonary outflow tract   No pneumothorax   No acute airspace disease   No large pleural effusion   The cardiomediastinal silhouette is unchanged       No pneumothorax with right IJ approach central line tip projecting over distal pulmonary outflow tract   MACRO: None   Signed by: Gui Hernandez 6/28/2024 8:19 AM Dictation workstation:   [id number]      Radiology:     XR chest 1 view   Final Result   1.  Postoperative changes of the mediastinum with residual   life-support lines and tubes as detailed. Distal segment of the   [place]amud catheter including the tip are not well visualized due to   obscuration from the median sternotomy hardware.   2. Low inspiratory volume with left basilar atelectasis versus   infiltrate.                  MACRO:   None.        Signed by: Jignesh Cortez 6/29/2024 10:59 AM   Dictation workstation:   [id number]      XR chest 1 view   Final Result   1.  Expected postoperative findings after median sternotomy and open   heart surgery. See detailed discussion above.   2. The nasogastric tube only projects slightly below the   gastroesophageal junction and should be advanced.                  MACRO:   None        Signed by: Joseph Schoenberger 6/28/2024 2:46 PM   Dictation workstation:   [id number]      XR chest 1 view   Final Result   No pneumothorax with right IJ approach central line tip projecting    over distal pulmonary outflow tract        MACRO:   None        Signed by: Gui Dickinsonjomar 6/28/2024 8:19 AM   Dictation workstation:   [id number]      Anesthesia Intraoperative Transesophageal Echocardiogram   Final Result      XR chest 1 view    (Results Pending)       Problem List:     Patient Active Problem List   Diagnosis    HTN (hypertension)    Dyslipidemia    CVA (cerebral vascular accident) (Multi)    LINA (obstructive sleep apnea)    Coronary artery disease of native artery of native heart with stable angina pectoris (CMS-[facility])    Cerebrovascular accident (CVA) due to thrombosis of right middle cerebral artery (Multi)    Cardiomyopathy (Multi)    Heart failure (Multi)    Hypothyroidism       ASSESSMENT & PLAN:   1.  Severe triple-vessel disease s/p CABG x 3 as noted above with LIMA to LAD-diagonal and vein graft to the OM 2 with preop EF estimated to be in the 40 to 45% range, which improved to 50% range postoperatively on IntraOp RENÉE.  Patient is doing well, currently off pressors with stable hemodynamics.  He is mildly volume overloaded and he is awaiting IV Lasix per ICU team.  He also has pericardial rub for which colchicine has been initiated.  Blood pressure is stable and will recommend starting beta-blockers at this time.  We will continue for the comanagement with the CVICU and CT surgical team.  2.  Hypertension: Blood pressure is fairly well-controlled on current medications.  3.  Dyslipidemia: Resume high intensity lipid-lowering therapy.  AKA        Thank you for the opportunity to participate in the care of your patient.  Please do not hesitate to call if you have any questions.    Electronically signed by Viktor Gonzalez MD, on 6/29/2024 at 11:32 AM

## 2024-06-30 ENCOUNTER — APPOINTMENT (OUTPATIENT)
Dept: RADIOLOGY | Facility: HOSPITAL | Age: 54
End: 2024-06-30
Payer: MEDICAID

## 2024-06-30 ENCOUNTER — APPOINTMENT (OUTPATIENT)
Dept: CARDIOLOGY | Facility: HOSPITAL | Age: 54
End: 2024-06-30
Payer: MEDICAID

## 2024-06-30 VITALS
HEIGHT: 69 IN | BODY MASS INDEX: 37.75 KG/M2 | RESPIRATION RATE: 21 BRPM | TEMPERATURE: 98.6 F | WEIGHT: 254.85 LBS | DIASTOLIC BLOOD PRESSURE: 79 MMHG | OXYGEN SATURATION: 94 % | HEART RATE: 82 BPM | SYSTOLIC BLOOD PRESSURE: 111 MMHG

## 2024-06-30 LAB
ALBUMIN SERPL BCP-MCNC: 3.8 G/DL (ref 3.4–5)
ANION GAP SERPL CALC-SCNC: 10 MMOL/L (ref 10–20)
ATRIAL RATE: 68 BPM
ATRIAL RATE: 74 BPM
ATRIAL RATE: 83 BPM
BUN SERPL-MCNC: 19 MG/DL (ref 6–23)
CA-I BLD-SCNC: 1.23 MMOL/L (ref 1.1–1.33)
CALCIUM SERPL-MCNC: 9.4 MG/DL (ref 8.6–10.3)
CHLORIDE SERPL-SCNC: 99 MMOL/L (ref 98–107)
CO2 SERPL-SCNC: 29 MMOL/L (ref 21–32)
CREAT SERPL-MCNC: 1.14 MG/DL (ref 0.5–1.3)
EGFRCR SERPLBLD CKD-EPI 2021: 77 ML/MIN/1.73M*2
ERYTHROCYTE [DISTWIDTH] IN BLOOD BY AUTOMATED COUNT: 13.6 % (ref 11.5–14.5)
GLUCOSE BLD MANUAL STRIP-MCNC: 114 MG/DL (ref 74–99)
GLUCOSE BLD MANUAL STRIP-MCNC: 118 MG/DL (ref 74–99)
GLUCOSE BLD MANUAL STRIP-MCNC: 132 MG/DL (ref 74–99)
GLUCOSE SERPL-MCNC: 107 MG/DL (ref 74–99)
HCT VFR BLD AUTO: 42.6 % (ref 41–52)
HGB BLD-MCNC: 14.1 G/DL (ref 13.5–17.5)
MAGNESIUM SERPL-MCNC: 1.92 MG/DL (ref 1.6–2.4)
MCH RBC QN AUTO: 28 PG (ref 26–34)
MCHC RBC AUTO-ENTMCNC: 33.1 G/DL (ref 32–36)
MCV RBC AUTO: 85 FL (ref 80–100)
NRBC BLD-RTO: 0 /100 WBCS (ref 0–0)
P AXIS: 38 DEGREES
P AXIS: 41 DEGREES
P AXIS: 41 DEGREES
P OFFSET: 192 MS
P OFFSET: 194 MS
P OFFSET: 197 MS
P ONSET: 132 MS
P ONSET: 134 MS
P ONSET: 137 MS
PHOSPHATE SERPL-MCNC: 3.9 MG/DL (ref 2.5–4.9)
PLATELET # BLD AUTO: 177 X10*3/UL (ref 150–450)
POTASSIUM SERPL-SCNC: 4.2 MMOL/L (ref 3.5–5.3)
PR INTERVAL: 148 MS
PR INTERVAL: 152 MS
PR INTERVAL: 158 MS
Q ONSET: 210 MS
Q ONSET: 211 MS
Q ONSET: 211 MS
QRS COUNT: 11 BEATS
QRS COUNT: 12 BEATS
QRS COUNT: 14 BEATS
QRS DURATION: 108 MS
QRS DURATION: 110 MS
QRS DURATION: 98 MS
QT INTERVAL: 396 MS
QT INTERVAL: 428 MS
QT INTERVAL: 430 MS
QTC CALCULATION(BAZETT): 457 MS
QTC CALCULATION(BAZETT): 465 MS
QTC CALCULATION(BAZETT): 475 MS
QTC FREDERICIA: 441 MS
QTC FREDERICIA: 448 MS
QTC FREDERICIA: 459 MS
R AXIS: -4 DEGREES
R AXIS: 1 DEGREES
R AXIS: 2 DEGREES
RBC # BLD AUTO: 5.04 X10*6/UL (ref 4.5–5.9)
SODIUM SERPL-SCNC: 134 MMOL/L (ref 136–145)
T AXIS: 26 DEGREES
T AXIS: 55 DEGREES
T AXIS: 99 DEGREES
T OFFSET: 409 MS
T OFFSET: 425 MS
T OFFSET: 425 MS
VENTRICULAR RATE: 68 BPM
VENTRICULAR RATE: 74 BPM
VENTRICULAR RATE: 83 BPM
WBC # BLD AUTO: 16.2 X10*3/UL (ref 4.4–11.3)

## 2024-06-30 PROCEDURE — 2500000004 HC RX 250 GENERAL PHARMACY W/ HCPCS (ALT 636 FOR OP/ED): Mod: JZ | Performed by: STUDENT IN AN ORGANIZED HEALTH CARE EDUCATION/TRAINING PROGRAM

## 2024-06-30 PROCEDURE — 2500000004 HC RX 250 GENERAL PHARMACY W/ HCPCS (ALT 636 FOR OP/ED)

## 2024-06-30 PROCEDURE — 82947 ASSAY GLUCOSE BLOOD QUANT: CPT

## 2024-06-30 PROCEDURE — 99233 SBSQ HOSP IP/OBS HIGH 50: CPT | Performed by: NURSE PRACTITIONER

## 2024-06-30 PROCEDURE — 2500000001 HC RX 250 WO HCPCS SELF ADMINISTERED DRUGS (ALT 637 FOR MEDICARE OP): Performed by: NURSE PRACTITIONER

## 2024-06-30 PROCEDURE — 93010 ELECTROCARDIOGRAM REPORT: CPT | Performed by: INTERNAL MEDICINE

## 2024-06-30 PROCEDURE — 99232 SBSQ HOSP IP/OBS MODERATE 35: CPT

## 2024-06-30 PROCEDURE — 2500000004 HC RX 250 GENERAL PHARMACY W/ HCPCS (ALT 636 FOR OP/ED): Performed by: NURSE PRACTITIONER

## 2024-06-30 PROCEDURE — 2500000001 HC RX 250 WO HCPCS SELF ADMINISTERED DRUGS (ALT 637 FOR MEDICARE OP)

## 2024-06-30 PROCEDURE — 85027 COMPLETE CBC AUTOMATED: CPT | Performed by: NURSE PRACTITIONER

## 2024-06-30 PROCEDURE — 2500000001 HC RX 250 WO HCPCS SELF ADMINISTERED DRUGS (ALT 637 FOR MEDICARE OP): Performed by: STUDENT IN AN ORGANIZED HEALTH CARE EDUCATION/TRAINING PROGRAM

## 2024-06-30 PROCEDURE — 82330 ASSAY OF CALCIUM: CPT | Performed by: NURSE PRACTITIONER

## 2024-06-30 PROCEDURE — 71045 X-RAY EXAM CHEST 1 VIEW: CPT | Performed by: RADIOLOGY

## 2024-06-30 PROCEDURE — 99233 SBSQ HOSP IP/OBS HIGH 50: CPT | Performed by: INTERNAL MEDICINE

## 2024-06-30 PROCEDURE — 37799 UNLISTED PX VASCULAR SURGERY: CPT | Performed by: NURSE PRACTITIONER

## 2024-06-30 PROCEDURE — 2020000001 HC ICU ROOM DAILY

## 2024-06-30 PROCEDURE — 93005 ELECTROCARDIOGRAM TRACING: CPT

## 2024-06-30 PROCEDURE — 80069 RENAL FUNCTION PANEL: CPT | Performed by: NURSE PRACTITIONER

## 2024-06-30 PROCEDURE — 71045 X-RAY EXAM CHEST 1 VIEW: CPT

## 2024-06-30 PROCEDURE — 83735 ASSAY OF MAGNESIUM: CPT | Performed by: NURSE PRACTITIONER

## 2024-06-30 PROCEDURE — 2500000005 HC RX 250 GENERAL PHARMACY W/O HCPCS

## 2024-06-30 RX ORDER — HEPARIN SODIUM 5000 [USP'U]/ML
5000 INJECTION, SOLUTION INTRAVENOUS; SUBCUTANEOUS EVERY 8 HOURS
Status: DISCONTINUED | OUTPATIENT
Start: 2024-06-30 | End: 2024-07-02

## 2024-06-30 RX ORDER — FUROSEMIDE 10 MG/ML
40 INJECTION INTRAMUSCULAR; INTRAVENOUS ONCE
Status: COMPLETED | OUTPATIENT
Start: 2024-06-30 | End: 2024-06-30

## 2024-06-30 ASSESSMENT — PAIN - FUNCTIONAL ASSESSMENT
PAIN_FUNCTIONAL_ASSESSMENT: 0-10

## 2024-06-30 ASSESSMENT — COGNITIVE AND FUNCTIONAL STATUS - GENERAL
WALKING IN HOSPITAL ROOM: A LITTLE
TURNING FROM BACK TO SIDE WHILE IN FLAT BAD: A LITTLE
TOILETING: A LITTLE
MOVING FROM LYING ON BACK TO SITTING ON SIDE OF FLAT BED WITH BEDRAILS: A LITTLE
CLIMB 3 TO 5 STEPS WITH RAILING: A LITTLE
HELP NEEDED FOR BATHING: A LOT
STANDING UP FROM CHAIR USING ARMS: A LITTLE
DAILY ACTIVITIY SCORE: 17
DRESSING REGULAR UPPER BODY CLOTHING: A LITTLE
DRESSING REGULAR LOWER BODY CLOTHING: A LOT
MOVING TO AND FROM BED TO CHAIR: A LITTLE
MOBILITY SCORE: 18
PERSONAL GROOMING: A LITTLE

## 2024-06-30 ASSESSMENT — PAIN SCALES - GENERAL
PAINLEVEL_OUTOF10: 2
PAINLEVEL_OUTOF10: 1
PAINLEVEL_OUTOF10: 5 - MODERATE PAIN
PAINLEVEL_OUTOF10: 3
PAINLEVEL_OUTOF10: 1

## 2024-06-30 ASSESSMENT — PAIN DESCRIPTION - LOCATION: LOCATION: CHEST

## 2024-06-30 NOTE — PROGRESS NOTES
[facility] Progress Note           Rounding Cardiologist:  Viktor Gonzalez MD    Date:  6/30/2024  Patient:  Kamar Garcia  YOB: 1970  MRN:  [id number]   Admit Date:  6/28/2024      SUBJECTIVE:    Kamar Garcia was seen and examined today at bedside.   He is postop day #2 s/p CABG x 3 with LIMA to diagonal-LAD, vein graft to OM 2    Vitals:    06/30/24 0800 06/30/24 0900 06/30/24 1000 06/30/24 1100   BP: 136/81 (!) 162/94 150/79 133/83   BP Location: Left arm  Left arm    Patient Position: Sitting  Sitting    Pulse: 86 90 80 78   Resp: (!) 31 (!) 33 19 (!) 28   Temp:       TempSrc:       SpO2: 91% 92% 93% 93%   Weight:   116 kg (254 lb 13.6 oz)    Height:           Intake/Output Summary (Last 24 hours) at 6/30/2024 1207  Last data filed at 6/30/2024 1000  Gross per 24 hour   Intake 2393 ml   Output 2540 ml   Net -147 ml       Wt Readings from Last 4 Encounters:   06/30/24 116 kg (254 lb 13.6 oz)   06/11/24 112 kg (246 lb)       CURRENT HOSPITAL MEDICATIONS:   acetaminophen, 650 mg, oral, q6h  aspirin, 81 mg, oral, Daily  atorvastatin, 40 mg, oral, Nightly  colchicine, 0.6 mg, oral, Daily  docusate sodium, 100 mg, oral, TID  heparin (porcine), 5,000 Units, subcutaneous, q8h  insulin lispro, 0-15 Units, subcutaneous, Before meals & nightly  levothyroxine, 50 mcg, oral, Daily  lidocaine, 1 patch, transdermal, Daily  methocarbamol, 500 mg, oral, q8h CLAUDIA  metoprolol tartrate, 25 mg, oral, BID  mupirocin, 0.5 Application, Topical, BID  oxygen, , inhalation, Continuous - Inhalation  pantoprazole, 40 mg, oral, Daily before breakfast   Or  pantoprazole, 40 mg, intravenous, Daily before breakfast  polyethylene glycol, 17 g, oral, BID  sacubitriL-valsartan, 1 tablet, oral, BID  simethicone, 80 mg, oral, 4x daily           PHYSICAL EXAMINATION:   GENERAL:  Well developed, well nourished, in no acute distress.  CHEST:  Symmetric and nontender.  NEURO/PSYCH:  Alert and oriented times three with approppriate behavior  "and responses.  NECK:  Supple, no JVD, no bruit.  LUNGS:  Clear to auscultation bilaterally, normal respiratory effort.  HEART:  Rate and rhythm regular with no evident murmur, no gallop appreciated.        There are no rubs, clicks or heaves.  EXTREMITIES:  Warm with good color, no clubbing or cyanosis.  There is no edema noted.  PERIPHERAL VASCULAR:  Pulses present and equally palpable; 2+ throughout.      LAB DATA:     CBC:   Results from last 7 days   Lab Units 06/30/24  0339   WBC AUTO x10*3/uL 16.2*   RBC AUTO x10*6/uL 5.04   HEMOGLOBIN g/dL 14.1   HEMATOCRIT % 42.6   PLATELETS AUTO x10*3/uL 177        CMP:    Results from last 7 days   Lab Units 06/30/24  0339   SODIUM mmol/L 134*   POTASSIUM mmol/L 4.2   CHLORIDE mmol/L 99   CO2 mmol/L 29   BUN mg/dL 19   CREATININE mg/dL 1.14   GLUCOSE mg/dL 107*   CALCIUM mg/dL 9.4       Cardiac Enzymes:  No results found for: \"TROPHS\"    Magnesium:    Lab Results   Component Value Date    MG 1.92 06/30/2024       Lipid Profile:  No results found for: \"CHLPL\", \"TRIG\", \"HDL\", \"LDLCALC\", \"LDLDIRECT\"    TSH:  No results found for: \"TSH\"    BNP: No results found for: \"BNP\"     PT/INR:    Lab Results   Component Value Date    PROTIME 12.0 06/28/2024    INR 1.1 06/28/2024       HgBA1c:  No results found for: \"HGBA1C\"    CBC:  Lab Results   Component Value Date    WBC 16.2 (H) 06/30/2024    WBC 14.0 (H) 06/29/2024    WBC 22.4 (H) 06/28/2024    RBC 5.04 06/30/2024    RBC 5.15 06/29/2024    RBC 5.71 06/28/2024    HGB 14.1 06/30/2024    HGB 14.3 06/29/2024    HGB 15.9 06/28/2024    HCT 42.6 06/30/2024    HCT 42.8 06/29/2024    HCT 47.2 06/28/2024    MCV 85 06/30/2024    MCV 83 06/29/2024    MCV 83 06/28/2024    MCH 28.0 06/30/2024    MCH 27.8 06/29/2024    MCH 27.8 06/28/2024    MCHC 33.1 06/30/2024    MCHC 33.4 06/29/2024    MCHC 33.7 06/28/2024    RDW 13.6 06/30/2024    RDW 13.6 06/29/2024    RDW 13.5 06/28/2024     06/30/2024     06/29/2024     06/28/2024 " "      BMP:  Lab Results   Component Value Date     (L) 06/30/2024     (L) 06/29/2024     06/28/2024     06/28/2024    K 4.2 06/30/2024    K 4.8 06/29/2024    K 4.6 06/28/2024    K 4.6 06/28/2024    CL 99 06/30/2024     06/29/2024     06/28/2024     06/28/2024    CO2 29 06/30/2024    CO2 23 06/29/2024    CO2 24 06/28/2024    CO2 24 06/28/2024    BUN 19 06/30/2024    BUN 20 06/29/2024    BUN 21 06/28/2024    BUN 21 06/28/2024    CREATININE 1.14 06/30/2024    CREATININE 1.15 06/29/2024    CREATININE 1.08 06/28/2024    CREATININE 1.08 06/28/2024       Hepatic Function Panel:  No results found for: \"ALKPHOS\", \"ALT\", \"AST\", \"PROT\", \"BILITOT\", \"BILIDIR\"    DIAGNOSTIC TESTING:     ECG 12 Lead    Result Date: 6/30/2024  Normal sinus rhythm Normal ECG When compared with ECG of 28-JUN-2024 23:29, (unconfirmed) No significant change was found Confirmed by Alexa Jimenez ([id number]) on 6/30/2024 11:52:13 AM    Electrocardiogram 12-lead PRN for arrhythmia    Result Date: 6/30/2024  Normal sinus rhythm Possible Left atrial enlargement Inferior infarct , age undetermined Abnormal ECG When compared with ECG of 28-JUN-2024 14:04, No significant change was found Confirmed by Alexa Jimenez ([id number]) on 6/30/2024 11:51:25 AM    XR chest 1 view    Result Date: 6/29/2024  Interpreted By:  Eleazar Coates, STUDY: XR CHEST 1 VIEW   INDICATION: Signs/Symptoms:Chest tube removal.   COMPARISON: June 29 earlier the same day   ACCESSION NUMBER(S): [id number]   ORDERING CLINICIAN: BEN JURADO   FINDINGS: Interval removal of the chest tubes. No consolidation, effusion, edema, or pneumothorax. Small left effusion and basilar atelectasis.         Interval chest tube removal without pneumothorax.   Signed by: Eleazar Coates 6/29/2024 4:37 PM Dictation workstation:   [id number]    XR chest 1 view    Result Date: 6/29/2024  Interpreted By:  Jignesh Cortez, STUDY: XR CHEST 1 VIEW;  6/29/2024 10:53 am   INDICATION: " Signs/Symptoms:s/p CABG.   COMPARISON: 06/28/2024.   ACCESSION NUMBER(S): [id number]   ORDERING CLINICIAN: KENN HERNANDEZ   FINDINGS: CARDIOMEDIASTINAL SILHOUETTE: Endotracheal tube and NG tube are no longer seen. A [place]-Mohamud catheter from right jugular approach remains in place and is visualized to the right atrium level. The distal segment of the [place]-Mohamud catheter including the tip is not well visualized due to obscuration by overlying median sternotomy hardware. A cephalad directed probable mediastinal drain remains in place. Cardiomegaly, aortic prominence with calcification and postoperative changes of the mediastinum are again seen.   LUNGS: Inspiratory volume is low. Left basilar chest tube remains in place. Left basilar atelectasis versus small infiltrate is present. No appreciable pneumothorax.   ABDOMEN: No remarkable upper abdominal findings.   BONES: Bones are stable.       1.  Postoperative changes of the mediastinum with residual life-support lines and tubes as detailed. Distal segment of the Rock[place]-Mohamud catheter including the tip are not well visualized due to obscuration from the median sternotomy hardware. 2. Low inspiratory volume with left basilar atelectasis versus infiltrate.       MACRO: None.   Signed by: Jignesh Cortez 6/29/2024 10:59 AM Dictation workstation:   [id number]    ECG 12 Lead    Result Date: 6/28/2024  Normal sinus rhythm Prolonged QT Abnormal ECG No previous ECGs available Confirmed by Alexa Jimenez ([id number]) on 6/28/2024 6:19:22 PM    XR chest 1 view    Result Date: 6/28/2024  Interpreted By:  Schoenberger, Joseph, STUDY: XR CHEST 1 VIEW;  6/28/2024 2:31 pm   INDICATION: Signs/Symptoms:s/p cabg.   COMPARISON: Exam performed at 7:46 a.m.   ACCESSION NUMBER(S): [id number]   ORDERING CLINICIAN: BEN JURADO   FINDINGS: In the interval since the prior, the patient has undergone a median sternotomy. The [place]-Mohamud catheter tip may have advanced somewhat and is likely in the  proximal left pulmonary artery. There is an endotracheal tube with its tip near the level of thoracic inlet 4.8 cm above the francisca. The nasogastric tube is noted the with its distal tip below the gastroesophageal junction only slightly and likely should be advanced. There is a midline chest drain and left basal chest tube without evidence for pneumothorax.       CARDIOMEDIASTINAL SILHOUETTE: Cardiac silhouette is enlarged possibly exaggerated due to hypoventilatory exam and AP projection. No venous congestion.   LUNGS: There is new subsegmental atelectasis in the left lung base retrocardiac region. A very small amount left pleural effusion is a consideration.   ABDOMEN: No remarkable upper abdominal findings.   BONES: No acute osseous changes.       1.  Expected postoperative findings after median sternotomy and open heart surgery. See detailed discussion above. 2. The nasogastric tube only projects slightly below the gastroesophageal junction and should be advanced.       MACRO: None   Signed by: Joseph Schoenberger 6/28/2024 2:46 PM Dictation workstation:   [id number]    Anesthesia Intraoperative Transesophageal Echocardiogram    Result Date: 6/28/2024          [place] [id number]  Tel [phone] Fax [phone] TRANSESOPHAGEAL ECHOCARDIOGRAM REPORT Patient Name:      ERWIN Gibbs Physician:    [id number] Anh Dhillon MD Study Date:        6/28/2024           Ordering Provider:    [id number] KENN HERNANDEZ MRN/PID:           [id number]            Fellow: Accession#:        [id number]        Nurse: Date of Birth/Age: 1970 / 53     Sonographer:          MD                    years Gender:            M                   Additional Staff: Height:            175.26 cm           Admit Date: Weight:            111.59 kg           Admission Status:  BSA / BMI:         2.26 m2 / 36.33     Department Location:                    kg/m2 Study Type:    ANESTHESIA INTRAOPERATIVE RENÉE Diagnosis/ICD: Coronary atherosclerosis due to calcified coronary lesion-I25.84  Study Detail: The following Echo studies were performed: 2D, M-Mode, Doppler and               color flow.  PHYSICIAN INTERPRETATION: RENÉE Details: Color flow Doppler echo was performed to assess for the presence of a patent foramen ovale. RENÉE Medication: The patient was sedated by Anesthesia; please refer to anesthesia flow sheet for medications used. RENÉE Procedure: The probe was passed without difficulty. Left Ventricle: The left ventricular systolic function is mildly decreased, with a visually estimated ejection fraction of 40-45%. Wall motion is abnormal. The left ventricular cavity size is normal. Spectral Doppler shows an impaired relaxation pattern of left ventricular diastolic filling. Left Atrium: The left atrium is normal in size. There is no evidence of a patent foramen ovale. There is no mass visualized in the left atrial appendage and there is no thrombus visualized in the left atrial appendage. Right Ventricle: The right ventricle is normal in size. There is normal right ventricular global systolic function. Right Atrium: The right atrium is normal in size. Aortic Valve: The aortic valve appears structurally normal. There is no evidence of aortic valve regurgitation. The peak instantaneous gradient of the aortic valve is 7.4 mmHg. The mean gradient of the aortic valve is 3.0 mmHg. Mitral Valve: The mitral valve is normal in structure. There is trace mitral valve regurgitation. Tricuspid Valve: The tricuspid valve is structurally normal. There is trace tricuspid regurgitation. Pulmonic Valve: The pulmonic valve is structurally normal. There is trace pulmonic valve regurgitation. Pericardium: There is no pericardial effusion noted. Aorta: The aortic root is normal.  CONCLUSIONS:  1. The left  ventricular systolic function is mildly decreased, with a visually estimated ejection fraction of 40-45%.  2. Spectral Doppler shows an impaired relaxation pattern of left ventricular diastolic filling.  3. There is normal right ventricular global systolic function.  4. Impaired relaxation with reversal of the E/A ratio.  5. Initially EF value is between 40 to 50%, postoperatively more in the 50%.  6. Pre-bypass that was noticeable inferior hypokinesia, but that was improved post bypass. QUANTITATIVE DATA SUMMARY: M-MODE MEASUREMENTS:                 Normal Ranges: IVSd:   3.55 cm (0.6-1.1cm) LVIDd:  5.34 cm (3.9-5.9cm) LVIDs:  3.94 cm LV % FS 26.3 % LV SYSTOLIC FUNCTION BY 2D PLANIMETRY (MOD):                      Normal Ranges: EF-A4C View:    50 % (>=55%) EF-Visual:      43 % LV EF Reported: 43 % LV DIASTOLIC FUNCTION:                         Normal Ranges: MV e'         0.210 m/s (>8.0) MV lateral e' 0.27 m/s MV medial e'  0.15 m/s AORTIC VALVE:                       Normal Ranges: AoV Vmax:    1.36 m/s (<=1.7m/s) AoV Peak P.4 mmHg (<20mmHg) AoV Mean PG: 3.0 mmHg (1.7-11.5mmHg) AoV VTI:     29.80 cm (18-25cm)  [id number] Troy Dhillon MD Electronically signed on 2024 at 2:09:04 PM  ** Final **     XR chest 1 view    Result Date: 2024  Interpreted By:  Gui Hernandez, STUDY: XR CHEST 1 VIEW;  2024 7:51 am   INDICATION: Signs/Symptoms:post swan placement.   COMPARISON: CT chest without contrast 2024   ACCESSION NUMBER(S): [id number]   ORDERING CLINICIAN: TROY DHILLON   TECHNIQUE: Single frontal view of the chest; Portable technique   FINDINGS: New right IJ approach pulmonary artery line tip is flipped projecting partially either anteriorly or posteriorly, overlies distal pulmonary outflow tract   No pneumothorax   No acute airspace disease   No large pleural effusion   The cardiomediastinal silhouette is unchanged       No pneumothorax with right IJ approach central line tip projecting over  distal pulmonary outflow tract   MACRO: None   Signed by: Gui Hernandez 6/28/2024 8:19 AM Dictation workstation:   [id number]       RADIOLOGY:     XR chest 1 view   Final Result   1.  Streaky atelectasis and or infiltrates of the left perihilar   region and base.                  MACRO:   None.        Signed by: Jignesh Cortez 6/30/2024 10:00 AM   Dictation workstation:   [id number]      XR chest 1 view   Final Result   Interval chest tube removal without pneumothorax.        Signed by: Eleazar Coates 6/29/2024 4:37 PM   Dictation workstation:   [id number]      XR chest 1 view   Final Result   1.  Postoperative changes of the mediastinum with residual   life-support lines and tubes as detailed. Distal segment of the   Burt-Mohamud catheter including the tip are not well visualized due to   obscuration from the median sternotomy hardware.   2. Low inspiratory volume with left basilar atelectasis versus   infiltrate.                  MACRO:   None.        Signed by: Jignesh Cortez 6/29/2024 10:59 AM   Dictation workstation:   [id number]      XR chest 1 view   Final Result   1.  Expected postoperative findings after median sternotomy and open   heart surgery. See detailed discussion above.   2. The nasogastric tube only projects slightly below the   gastroesophageal junction and should be advanced.                  MACRO:   None        Signed by: Joseph Schoenberger 6/28/2024 2:46 PM   Dictation workstation:   [id number]      XR chest 1 view   Final Result   No pneumothorax with right IJ approach central line tip projecting   over distal pulmonary outflow tract        MACRO:   None        Signed by: Gui Hernandez 6/28/2024 8:19 AM   Dictation workstation:   [id number]      Anesthesia Intraoperative Transesophageal Echocardiogram   Final Result      XR chest 1 view    (Results Pending)       PROBLEM LIST     Patient Active Problem List   Diagnosis    HTN (hypertension)    Dyslipidemia    CVA (cerebral vascular  accident) (Multi)    LINA (obstructive sleep apnea)    Coronary artery disease of native artery of native heart with stable angina pectoris (CMS-HCC)    Cerebrovascular accident (CVA) due to thrombosis of right middle cerebral artery (Multi)    Cardiomyopathy (Multi)    Heart failure (Multi)    Hypothyroidism       ASSESSMENT & PLAN:    Severe triple-vessel disease s/p CABG x 3 as noted above with LIMA to LAD-diagonal and vein graft to the OM 2 with preop EF estimated to be in the 40 to 45% range, which improved to 50% range postoperatively on IntraOp RENÉE.  However, I do not think this i true reflection of his EF as the patient was most likely on pressors and inotropes when this echo was done.  Will recommend a repeat echocardiogram prior to discharge for reevaluation of his LV function.  His blood pressure has been running high and in view of his preop LV dysfunction, agree with plans to initiate Entresto 24-26 mg twice daily and uptitrate as blood pressure tolerates.  Hypertension: Blood pressure is fairly well-controlled on current medications.  Dyslipidemia: Resume high intensity lipid-lowering therapy.  AKA        Please do not hesitate to call with questions.  Electronically signed by Viktor Gonzalez MD, on 6/30/2024 at 12:07 PM

## 2024-06-30 NOTE — CARE PLAN
The patient's goals for the shift include      The clinical goals for the shift include Patient will remain free of injury throughout shift    Patient is meeting and/or progressing at goals at this time. Will continue to manage pain and increase activity as tolerate. Safety maintained, POC discussed patient verbalized understanding.

## 2024-06-30 NOTE — PROGRESS NOTES
.[facility] Critical Care Medicine       Date:  6/30/2024  Patient:  Kamar Garcia  YOB: 1970  MRN:  [id number]   Admit Date:  6/28/2024  ========================================================================================================      History of Present Illness:  Kamar Garcia is a 53 y.o. year old male patient with Past Medical History of HTN, hypothyroidism, HFrEF (35-40%), HLD, prior CVA was evaluated in June by Dr. Lovell for surgical revascularization. He had stroke in June that prompted a LHC by echocardiogram. His LHC showed multivessel coronary artery disease including his LAD, D1, Circumflex, OM1, OM2 and RCA. In his best interest, patient opted for surgical revascularization.         Interval ICU Events:  6/28: Patient underwent CABG x 3 (LIMA-DIAG-LAD, SVG-OM2), Right Leg EVH and median sternotomy. Cardiopulmonary Bypass Time 118 min, XC time 95 min. Given 2200 ml of crystalloid fluid, 777 Cell Saver. UOP during procedure 1200mls. R and left Pleural Chest Tubes and 1 Mediastinal Chest tubes each with minimal sanguinous drainage. Required x 4 shocks s/p spb. Returned to ICU on nitroglycerin and epinephrine gtt due to low index    6/29: Off epinephrine gtt this am. Up to chair, minimal chest tube output overnight. Pain well controlled. Will plan to start BB this am, along with 40mg IV lasix, will add colchicine as well for pericardial rub. Plan to ambulate patient this am and remove chest tubes if no increase in output afterwards.     6/30: Continued off all vasoactive medications. Chest tubes removed, will removed cordis today and plan for transfer. Will start entresto today pending cardiology recommendations        Medical History:  Past Medical History:   Diagnosis Date    Coronary artery disease     COVID-19     NOT VACCINATED    Dyslipidemia     HTN (hypertension)     Hyperlipidemia     Hypothyroidism     Stroke (Multi)      Past Surgical History:   Procedure Laterality Date     CARDIAC CATHETERIZATION      HERNIA REPAIR Right     inguinal     Medications Prior to Admission   Medication Sig Dispense Refill Last Dose    aspirin 81 mg EC tablet Take 1 tablet (81 mg) by mouth early in the morning..   6/28/2024 at 0300    atorvastatin (Lipitor) 40 mg tablet Take 1 tablet (40 mg) by mouth early in the morning..   6/27/2024    furosemide (Lasix) 40 mg tablet Take 1 tablet (40 mg) by mouth early in the morning..   6/27/2024    levothyroxine (Synthroid, Levoxyl) 50 mcg tablet Take 1 tablet (50 mcg) by mouth early in the morning..   6/27/2024    lisinopril 10 mg tablet Take 1 tablet (10 mg) by mouth early in the morning..   Past Week    metoprolol succinate XL (Toprol-XL) 50 mg 24 hr tablet Take 1 tablet (50 mg) by mouth early in the morning..   6/28/2024 at 0300    mupirocin (Bactroban) 2 % ointment Apply topically. Apply to both nostrils twice daily 5 days before surgery   6/27/2024     Patient has no known allergies.  Social History     Tobacco Use    Smoking status: Former     Types: Cigarettes    Smokeless tobacco: Former     Types: Snuff   Vaping Use    Vaping status: Never Used   Substance Use Topics    Alcohol use: Never    Drug use: Never     Family History   Problem Relation Name Age of Onset    Pancreatic cancer Mother      COPD Father         Review of Systems:  14 point review of systems was completed and negative except for those specially mention in my HPI    Physical Exam:    Heart Rate:  [72-90]   Temp:  [36.2 °C (97.2 °F)-37.3 °C (99.1 °F)]   Resp:  [17-36]   BP: (112-163)/()   SpO2:  [88 %-100 %]     Physical Exam  Vitals reviewed.   Constitutional:       General: He is awake.      Appearance: He is overweight.   HENT:      Head: Normocephalic and atraumatic.   Cardiovascular:      Rate and Rhythm: Normal rate and regular rhythm.      Pulses: Normal pulses.      Heart sounds: Heart sounds not distant. No murmur heard.     Friction rub present.   Pulmonary:      Effort:  Pulmonary effort is normal.      Breath sounds: Examination of the right-upper field reveals rhonchi. Examination of the left-upper field reveals rhonchi. Rhonchi present.   Chest:      Comments: Medial Sternotomy Incision CDI   Abdominal:      General: Abdomen is flat. Bowel sounds are decreased.      Palpations: Abdomen is soft.      Tenderness: There is no abdominal tenderness.   Musculoskeletal:      Cervical back: Full passive range of motion without pain.      Right lower leg: No edema.      Left lower leg: No edema.   Skin:     General: Skin is warm.      Capillary Refill: Capillary refill takes less than 2 seconds.      Comments: EvH CDI    Neurological:      Mental Status: He is alert and oriented to person, place, and time.   Psychiatric:         Behavior: Behavior is cooperative.         Objective:    ECG 12 Lead    Result Date: 6/30/2024  Normal sinus rhythm Possible Left atrial enlargement Inferior infarct , age undetermined Abnormal ECG When compared with ECG of 29-JUN-2024 06:50, (unconfirmed) No significant change was found    XR chest 1 view    Result Date: 6/29/2024  Interpreted By:  Eleazar Coates, STUDY: XR CHEST 1 VIEW   INDICATION: Signs/Symptoms:Chest tube removal.   COMPARISON: June 29 earlier the same day   ACCESSION NUMBER(S): [id number]   ORDERING CLINICIAN: BEN JURADO   FINDINGS: Interval removal of the chest tubes. No consolidation, effusion, edema, or pneumothorax. Small left effusion and basilar atelectasis.         Interval chest tube removal without pneumothorax.   Signed by: Eleazar Coates 6/29/2024 4:37 PM Dictation workstation:   [id number]    XR chest 1 view    Result Date: 6/29/2024  Interpreted By:  Jignesh Cortez, STUDY: XR CHEST 1 VIEW;  6/29/2024 10:53 am   INDICATION: Signs/Symptoms:s/p CABG.   COMPARISON: 06/28/2024.   ACCESSION NUMBER(S): [id number]   ORDERING CLINICIAN: KENN HERNANDEZ   FINDINGS: CARDIOMEDIASTINAL SILHOUETTE: Endotracheal tube and NG tube are  no longer seen. A [place]-Mohamud catheter from right jugular approach remains in place and is visualized to the right atrium level. The distal segment of the [place]-Mohamud catheter including the tip is not well visualized due to obscuration by overlying median sternotomy hardware. A cephalad directed probable mediastinal drain remains in place. Cardiomegaly, aortic prominence with calcification and postoperative changes of the mediastinum are again seen.   LUNGS: Inspiratory volume is low. Left basilar chest tube remains in place. Left basilar atelectasis versus small infiltrate is present. No appreciable pneumothorax.   ABDOMEN: No remarkable upper abdominal findings.   BONES: Bones are stable.       1.  Postoperative changes of the mediastinum with residual life-support lines and tubes as detailed. Distal segment of the [place]-Mohamud catheter including the tip are not well visualized due to obscuration from the median sternotomy hardware. 2. Low inspiratory volume with left basilar atelectasis versus infiltrate.       MACRO: None.   Signed by: Jignesh Cortez 6/29/2024 10:59 AM Dictation workstation:   [id number]    Electrocardiogram 12-lead PRN for arrhythmia    Result Date: 6/29/2024  Normal sinus rhythm Possible Left atrial enlargement Inferior infarct , age undetermined Abnormal ECG When compared with ECG of 28-JUN-2024 14:04, No significant change was found    ECG 12 Lead    Result Date: 6/29/2024  Normal sinus rhythm Normal ECG When compared with ECG of 28-JUN-2024 23:29, (unconfirmed) No significant change was found    ECG 12 Lead    Result Date: 6/28/2024  Normal sinus rhythm Prolonged QT Abnormal ECG No previous ECGs available Confirmed by Alexa Jimenez ([id number]) on 6/28/2024 6:19:22 PM    XR chest 1 view    Result Date: 6/28/2024  Interpreted By:  Schoenberger, Joseph, STUDY: XR CHEST 1 VIEW;  6/28/2024 2:31 pm   INDICATION: Signs/Symptoms:s/p cabg.   COMPARISON: Exam performed at 7:46 a.m.   ACCESSION NUMBER(S):  [id number]   ORDERING CLINICIAN: BEN JURADO   FINDINGS: In the interval since the prior, the patient has undergone a median sternotomy. The [place]-Mohamud catheter tip may have advanced somewhat and is likely in the proximal left pulmonary artery. There is an endotracheal tube with its tip near the level of thoracic inlet 4.8 cm above the francisca. The nasogastric tube is noted the with its distal tip below the gastroesophageal junction only slightly and likely should be advanced. There is a midline chest drain and left basal chest tube without evidence for pneumothorax.       CARDIOMEDIASTINAL SILHOUETTE: Cardiac silhouette is enlarged possibly exaggerated due to hypoventilatory exam and AP projection. No venous congestion.   LUNGS: There is new subsegmental atelectasis in the left lung base retrocardiac region. A very small amount left pleural effusion is a consideration.   ABDOMEN: No remarkable upper abdominal findings.   BONES: No acute osseous changes.       1.  Expected postoperative findings after median sternotomy and open heart surgery. See detailed discussion above. 2. The nasogastric tube only projects slightly below the gastroesophageal junction and should be advanced.       MACRO: None   Signed by: Joseph Schoenberger 6/28/2024 2:46 PM Dictation workstation:   [id number]      Results for orders placed or performed during the hospital encounter of 06/28/24 (from the past 24 hour(s))   POCT GLUCOSE   Result Value Ref Range    POCT Glucose 156 (H) 74 - 99 mg/dL   POCT GLUCOSE   Result Value Ref Range    POCT Glucose 117 (H) 74 - 99 mg/dL   POCT GLUCOSE   Result Value Ref Range    POCT Glucose 153 (H) 74 - 99 mg/dL   POCT GLUCOSE   Result Value Ref Range    POCT Glucose 112 (H) 74 - 99 mg/dL   Calcium, Ionized   Result Value Ref Range    POCT Calcium, Ionized 1.23 1.1 - 1.33 mmol/L   Magnesium   Result Value Ref Range    Magnesium 1.92 1.60 - 2.40 mg/dL   CBC   Result Value Ref Range    WBC 16.2 (H)  4.4 - 11.3 x10*3/uL    nRBC 0.0 0.0 - 0.0 /100 WBCs    RBC 5.04 4.50 - 5.90 x10*6/uL    Hemoglobin 14.1 13.5 - 17.5 g/dL    Hematocrit 42.6 41.0 - 52.0 %    MCV 85 80 - 100 fL    MCH 28.0 26.0 - 34.0 pg    MCHC 33.1 32.0 - 36.0 g/dL    RDW 13.6 11.5 - 14.5 %    Platelets 177 150 - 450 x10*3/uL   Renal Function Panel   Result Value Ref Range    Glucose 107 (H) 74 - 99 mg/dL    Sodium 134 (L) 136 - 145 mmol/L    Potassium 4.2 3.5 - 5.3 mmol/L    Chloride 99 98 - 107 mmol/L    Bicarbonate 29 21 - 32 mmol/L    Anion Gap 10 10 - 20 mmol/L    Urea Nitrogen 19 6 - 23 mg/dL    Creatinine 1.14 0.50 - 1.30 mg/dL    eGFR 77 >60 mL/min/1.73m*2    Calcium 9.4 8.6 - 10.3 mg/dL    Phosphorus 3.9 2.5 - 4.9 mg/dL    Albumin 3.8 3.4 - 5.0 g/dL   ECG 12 Lead   Result Value Ref Range    Ventricular Rate 83 BPM    Atrial Rate 83 BPM    DC Interval 148 ms    QRS Duration 108 ms    QT Interval 396 ms    QTC Calculation(Bazett) 465 ms    P Axis 41 degrees    R Axis -4 degrees    T Axis 99 degrees    QRS Count 14 beats    Q Onset 211 ms    P Onset 137 ms    P Offset 197 ms    T Offset 409 ms    QTC Fredericia 441 ms   Assessment/Plan:     I am currently managing this critically ill patient for the following problems:     Neuro/Psych/Pain Ctrl/Sedation:  #Post-Op Pain  #Hx CVA  -Neuro checks, CAM Assessment, Delirium precautions  -PRN Dilaudid, oxycodone, Tylenol for pain control  -Avoid Toradol on POD 0, can consider POD 1  -Lidocaine patches     Respiratory/ENT:  #Post-Op Respiratory Insufficiency  -Wean supplemental oxygen for spO2 goal > 92%  -ABG as needed  -Daily CXR     Cardiovascular:  #Triple Vessel CAD s/p CABG x 3  Patient underwent CABG x 3 (LIMA-DIAG-LAD, SVG-OM2), Right Leg EVH and median sternotomy.   -V wires in place not being paced   -Daily BMP, keep K >4, Mg > 2  -Start aspirin and statin POD #0  -Will give additional 40 IV lasix today,  continue metoprolol 25 mg BID and add colchicine for pericardial rub   -Takes  "lisinopril at home however plan to start entresto. Will need to discontinue his home lisinopril at discharge    -Roxana-op Ancef x 48 hours  -PT/OT consult and OOB POD #1  -CVS following and appreciate further management     GI:  -PPI     Renal/Volume Status (Intra & Extravascular):  No acute issues  No CKD at baseline   Monitor RFP    Endocrine  #Hyperglycemia  -No history of DM, likely stress induced  -Strict blood glucose control post-operatively  -Glucose goal   -Insulin gtt vs SSI per ICU protocol     Infectious Disease:  -Perioperative Ancef x 48 hours     Heme/Onc:  #Acute Blood Loss Anemia Post-Op  -Baseline Hgb 14.0  -Daily CBC  -Transfuse for Hgb goal > 7 or massive blood loss with hemodynamic instability     ORTHO/MSK:  -PT/OT, Cardiac Rehab POD #1     Ethics/Code Status:  FULL CODE     :  DVT Prophylaxis: Holding  GI Prophylaxis: PO PPI  Bowel Regimen: Docusate, Senna, Miralax  Diet: Cardiac Diet   CVC: RIJ CVC  [place]: removed  Gonzalez: Remove   Restraints: yes  Dispo: plan to downgrade to telemetry floor today      [facility] Cardiothoracic Surgery/ICU Quality Check      New Onset Organ Failure (TIN, Shock, ALI etc): none  >2 Vasopressors/Inotropes (Levophed > 0.1mcg/kg/min + Vasopressin) for more than 8 hours: none  Ongoing Blood Product Transfusion: none  Sepsis/New Infection: none  Delirium: none  Readmission to ICU: n/a  Family/Patient Concerns: none    If any \"yes\" to the above, action plan as noted below:    None      Critical Care Time: 35    Plan Discussed with Dr. Osborn and CTS     Bia ANDRE, CNP  Critical Care Medicine   [facility]        "

## 2024-06-30 NOTE — PROGRESS NOTES
Kamar Garcia is a 53 y.o. male on day 2 of admission presenting with Coronary artery disease of native artery of native heart with stable angina pectoris (CMS-HCC).    Subjective   Mr. Garcia continues to do very well. He is afebrile and hemodynamically stable. Maintaining SR. Intermittently hypertensive with systolic BP into 160s. Discussed with Cardiology and Entresto initiated. Maintaining adequate SpO2 on RA. Pain well controlled. Tolerating diet without nausea. Has not moved bowels post-operatively. CXR with slight improvement in gastric distention. Abdominal exam negative with normal bowel sounds throughout; continue bowel regimen and simethicone; frequent ambulation encouraged. Pt responded well to diuresis yesterday with 3L out and net -792mL; will repeat Lasix 40mg IV today. Evaluated by therapy; AMPAC scores 17 (PT and OT). Will remain in ICU today due to staffing issues on telemetry unit. There were no acute overnight events.     Objective     Physical Exam  Vitals and nursing note reviewed.   Constitutional:       General: He is not in acute distress.     Appearance: Normal appearance. He is obese.   HENT:      Head: Normocephalic and atraumatic.      Right Ear: External ear normal.      Left Ear: External ear normal.      Nose: Nose normal.      Mouth/Throat:      Mouth: Mucous membranes are moist.      Pharynx: Oropharynx is clear.   Eyes:      Extraocular Movements: Extraocular movements intact.      Pupils: Pupils are equal, round, and reactive to light.   Cardiovascular:      Rate and Rhythm: Normal rate and regular rhythm.      Pulses: Normal pulses.      Heart sounds:      Friction rub present.      Comments: Pericardial rub faintly appreciated   Ventricular epicardial wires insulated; has not required pacing  Pulmonary:      Effort: Pulmonary effort is normal.      Comments: Spirometry volumes 750mL-1000mL  Slightly diminished at bases. No adventitious sounds appreciated   Sternotomy is well  "approximated without erythema or drainage. Small hematoma at manubrium. Sternum stable   Abdominal:      Comments: Protuberant but soft. Normoactive bowel sounds throughout. No tenderness.     Genitourinary:     Comments: Gonzalez removed 6/29; voiding freely   Musculoskeletal:         General: Normal range of motion.      Comments: Generalized post op weakness  1+ lower extremity edema    Skin:     General: Skin is warm and dry.   Neurological:      General: No focal deficit present.      Mental Status: He is alert and oriented to person, place, and time.   Psychiatric:         Mood and Affect: Mood normal.         Behavior: Behavior normal.         Last Recorded Vitals  Blood pressure 133/83, pulse 78, temperature 36.5 °C (97.7 °F), temperature source Temporal, resp. rate (!) 28, height 1.753 m (5' 9\"), weight 116 kg (254 lb 13.6 oz), SpO2 93%.  Intake/Output last 3 Shifts:  I/O last 3 completed shifts:  In: 5604 (50.2 mL/kg) [P.O.:3902; I.V.:1302 (11.7 mL/kg); IV Piggyback:400]  Out: 4182 (37.5 mL/kg) [Urine:3840 (1 mL/kg/hr); Chest Tube:342]  Weight: 111.6 kg     Relevant Results  Scheduled medications  acetaminophen, 650 mg, oral, q6h  aspirin, 81 mg, oral, Daily  atorvastatin, 40 mg, oral, Nightly  colchicine, 0.6 mg, oral, Daily  docusate sodium, 100 mg, oral, TID  insulin lispro, 0-15 Units, subcutaneous, Before meals & nightly  levothyroxine, 50 mcg, oral, Daily  lidocaine, 1 patch, transdermal, Daily  methocarbamol, 500 mg, oral, q8h CLAUDIA  metoprolol tartrate, 25 mg, oral, BID  mupirocin, 0.5 Application, Topical, BID  oxygen, , inhalation, Continuous - Inhalation  pantoprazole, 40 mg, oral, Daily before breakfast   Or  pantoprazole, 40 mg, intravenous, Daily before breakfast  polyethylene glycol, 17 g, oral, BID  sacubitriL-valsartan, 1 tablet, oral, BID  simethicone, 80 mg, oral, 4x daily      Continuous medications     PRN medications  PRN medications: bisacodyl, calcium chloride, calcium chloride, dextrose " **OR** glucagon, HYDROmorphone, magnesium hydroxide, magnesium sulfate, magnesium sulfate, metoclopramide **OR** metoclopramide, naloxone, ondansetron ODT **OR** ondansetron, oxyCODONE, oxyCODONE, potassium chloride CR, potassium chloride CR    Results for orders placed or performed during the hospital encounter of 06/28/24 (from the past 24 hour(s))   POCT GLUCOSE   Result Value Ref Range    POCT Glucose 117 (H) 74 - 99 mg/dL   POCT GLUCOSE   Result Value Ref Range    POCT Glucose 153 (H) 74 - 99 mg/dL   POCT GLUCOSE   Result Value Ref Range    POCT Glucose 112 (H) 74 - 99 mg/dL   Calcium, Ionized   Result Value Ref Range    POCT Calcium, Ionized 1.23 1.1 - 1.33 mmol/L   Magnesium   Result Value Ref Range    Magnesium 1.92 1.60 - 2.40 mg/dL   CBC   Result Value Ref Range    WBC 16.2 (H) 4.4 - 11.3 x10*3/uL    nRBC 0.0 0.0 - 0.0 /100 WBCs    RBC 5.04 4.50 - 5.90 x10*6/uL    Hemoglobin 14.1 13.5 - 17.5 g/dL    Hematocrit 42.6 41.0 - 52.0 %    MCV 85 80 - 100 fL    MCH 28.0 26.0 - 34.0 pg    MCHC 33.1 32.0 - 36.0 g/dL    RDW 13.6 11.5 - 14.5 %    Platelets 177 150 - 450 x10*3/uL   Renal Function Panel   Result Value Ref Range    Glucose 107 (H) 74 - 99 mg/dL    Sodium 134 (L) 136 - 145 mmol/L    Potassium 4.2 3.5 - 5.3 mmol/L    Chloride 99 98 - 107 mmol/L    Bicarbonate 29 21 - 32 mmol/L    Anion Gap 10 10 - 20 mmol/L    Urea Nitrogen 19 6 - 23 mg/dL    Creatinine 1.14 0.50 - 1.30 mg/dL    eGFR 77 >60 mL/min/1.73m*2    Calcium 9.4 8.6 - 10.3 mg/dL    Phosphorus 3.9 2.5 - 4.9 mg/dL    Albumin 3.8 3.4 - 5.0 g/dL   ECG 12 Lead   Result Value Ref Range    Ventricular Rate 83 BPM    Atrial Rate 83 BPM    KS Interval 148 ms    QRS Duration 108 ms    QT Interval 396 ms    QTC Calculation(Bazett) 465 ms    P Axis 41 degrees    R Axis -4 degrees    T Axis 99 degrees    QRS Count 14 beats    Q Onset 211 ms    P Onset 137 ms    P Offset 197 ms    T Offset 409 ms    QTC Fredericia 441 ms   POCT GLUCOSE   Result Value Ref Range     POCT Glucose 118 (H) 74 - 99 mg/dL     XR chest 1 view    Result Date: 6/30/2024  Interpreted By:  Jignesh Cortez, STUDY: XR CHEST 1 VIEW;  6/30/2024 5:30 am   INDICATION: Signs/Symptoms:s/p CABG.   COMPARISON: 06/29/2024.   ACCESSION NUMBER(S): [id number]   ORDERING CLINICIAN: KENN HERNANDEZ   FINDINGS: CARDIOMEDIASTINAL SILHOUETTE: Right jugular central line remains in place with tubing tip at the SVC level. Cardiomegaly, aortic prominence with calcification and postoperative changes of the mediastinum are similar to prior.   LUNGS: Inspiratory volume is low. Streaky atelectasis and/or infiltrate is seen in the left perihilar region and base. No right lung consolidation. No definite pleural effusion. No appreciable pneumothorax.   ABDOMEN: No remarkable upper abdominal findings.   BONES: Bones are stable.       1.  Streaky atelectasis and or infiltrates of the left perihilar region and base.       MACRO: None.   Signed by: Jignesh Cortez 6/30/2024 10:00 AM Dictation workstation:   [id number]    ECG 12 Lead    Result Date: 6/30/2024  Normal sinus rhythm Possible Left atrial enlargement Inferior infarct , age undetermined Abnormal ECG When compared with ECG of 29-JUN-2024 06:50, (unconfirmed) No significant change was found    XR chest 1 view    Result Date: 6/29/2024  Interpreted By:  Eleazar Coates, STUDY: XR CHEST 1 VIEW   INDICATION: Signs/Symptoms:Chest tube removal.   COMPARISON: June 29 earlier the same day   ACCESSION NUMBER(S): [id number]   ORDERING CLINICIAN: BEN JURADO   FINDINGS: Interval removal of the chest tubes. No consolidation, effusion, edema, or pneumothorax. Small left effusion and basilar atelectasis.         Interval chest tube removal without pneumothorax.   Signed by: Eleazar Coates 6/29/2024 4:37 PM Dictation workstation:   [id number]    XR chest 1 view    Result Date: 6/29/2024  Interpreted By:  Jignesh Cortez, STUDY: XR CHEST 1 VIEW;  6/29/2024 10:53 am   INDICATION:  Signs/Symptoms:s/p CABG.   COMPARISON: 06/28/2024.   ACCESSION NUMBER(S): [id number]   ORDERING CLINICIAN: KENN HERNANDEZ   FINDINGS: CARDIOMEDIASTINAL SILHOUETTE: Endotracheal tube and NG tube are no longer seen. A Glenham-Mohamud catheter from right jugular approach remains in place and is visualized to the right atrium level. The distal segment of the Glenham-Mohamud catheter including the tip is not well visualized due to obscuration by overlying median sternotomy hardware. A cephalad directed probable mediastinal drain remains in place. Cardiomegaly, aortic prominence with calcification and postoperative changes of the mediastinum are again seen.   LUNGS: Inspiratory volume is low. Left basilar chest tube remains in place. Left basilar atelectasis versus small infiltrate is present. No appreciable pneumothorax.   ABDOMEN: No remarkable upper abdominal findings.   BONES: Bones are stable.       1.  Postoperative changes of the mediastinum with residual life-support lines and tubes as detailed. Distal segment of the Glenham-Mohamud catheter including the tip are not well visualized due to obscuration from the median sternotomy hardware. 2. Low inspiratory volume with left basilar atelectasis versus infiltrate.       MACRO: None.   Signed by: Jignesh Cortez 6/29/2024 10:59 AM Dictation workstation:   [id number]    Electrocardiogram 12-lead PRN for arrhythmia    Result Date: 6/29/2024  Normal sinus rhythm Possible Left atrial enlargement Inferior infarct , age undetermined Abnormal ECG When compared with ECG of 28-JUN-2024 14:04, No significant change was found    ECG 12 Lead    Result Date: 6/29/2024  Normal sinus rhythm Normal ECG When compared with ECG of 28-JUN-2024 23:29, (unconfirmed) No significant change was found    ECG 12 Lead    Result Date: 6/28/2024  Normal sinus rhythm Prolonged QT Abnormal ECG No previous ECGs available Confirmed by Alexa Jimenez ([id number]) on 6/28/2024 6:19:22 PM    XR chest 1 view    Result Date:  6/28/2024  Interpreted By:  Schoenberger, Joseph, STUDY: XR CHEST 1 VIEW;  6/28/2024 2:31 pm   INDICATION: Signs/Symptoms:s/p cabg.   COMPARISON: Exam performed at 7:46 a.m.   ACCESSION NUMBER(S): [id number]   ORDERING CLINICIAN: BEN JURADO   FINDINGS: In the interval since the prior, the patient has undergone a median sternotomy. The Ozan-Mohamud catheter tip may have advanced somewhat and is likely in the proximal left pulmonary artery. There is an endotracheal tube with its tip near the level of thoracic inlet 4.8 cm above the francisca. The nasogastric tube is noted the with its distal tip below the gastroesophageal junction only slightly and likely should be advanced. There is a midline chest drain and left basal chest tube without evidence for pneumothorax.       CARDIOMEDIASTINAL SILHOUETTE: Cardiac silhouette is enlarged possibly exaggerated due to hypoventilatory exam and AP projection. No venous congestion.   LUNGS: There is new subsegmental atelectasis in the left lung base retrocardiac region. A very small amount left pleural effusion is a consideration.   ABDOMEN: No remarkable upper abdominal findings.   BONES: No acute osseous changes.       1.  Expected postoperative findings after median sternotomy and open heart surgery. See detailed discussion above. 2. The nasogastric tube only projects slightly below the gastroesophageal junction and should be advanced.       MACRO: None   Signed by: Joseph Schoenberger 6/28/2024 2:46 PM Dictation workstation:   [id number]         Assessment/Plan   Principal Problem:    Coronary artery disease of native artery of native heart with stable angina pectoris (CMS-HCC)    CAD; ICM  During work up for CVA, echo revealed moderately reduced LV function with EF 35-40%. Pt subsequently underwent LHC revealing multivessel CAD and was referred for surgical revascularization.  June 28th, 2024 pt presented to [facility] and underwent CABG x3 with LIMA-Diag-LAD, SVG-OM2;  endoscopic harvest of R great saphenous vein; median sternotomy.   -Discussed with Dr Isaac Martin and Dr. Lovell as well as critical care team  -Remove cordis   -Epicardial wires to remain in place; may insulate as pt has not required pacing  -ASA and Statin daily  -Metoprolol 25mg BID; optimize as hemodynamics allow  -Entresto 24-26mg BID   -Lasix 40mg IVPx1; further adjustments based on response.  -Keep K>4 and Mag>2;  PRN replacement as ordered   -Multimodal pain regimen as ordered  -Bowel regimen of Miralax and Colace. Simethicone QID   -Cardiac diet. Protonix for GI ppx   -sub-q Heparin for DVT ppx   -Encourage IS. Supplemental oxygen as need for SPO2>90%  -Increase activity as tolerated; ambulate TID; OOB for all meals  -Therapy following; [facility] scores 17 (PT and OT)   -SW for DC planning  -CBC, BMP, Mag, CXR, and EKG in AM  -will remain in ICU today due to staffing issues on telemetry unit      Pericarditis  Post operative pericarditis as evidenced by pericardial rub and mild leukocytosis.  POD#2: WBC 16.2 (remains afebrile without overt signs of infection on exam). Pericardial rub has nearly resolved   -Colchicine BID  -follow EKG     Hx of CVA  Presented to ED in June c/o left sided weakness. MRI confirmed acute/subacute infarcts R frontal and parietal lobes.  -ASA and Statin daily  -therapy following      HTN; HLD  -Metoprolol 25mg BID; optimize as hemodynamics allow  -Statin daily   -appreciate assistance of Cardiology colleagues      Hypothyroidism  -Continue home Synthroid    I spent 30 minutes in the professional and overall care of this patient.      JES Hanna-CNP

## 2024-06-30 NOTE — CARE PLAN
Problem: Pain - Adult  Goal: Verbalizes/displays adequate comfort level or baseline comfort level  Outcome: Progressing     Problem: Safety - Adult  Goal: Free from fall injury  Outcome: Progressing     Problem: Discharge Planning  Goal: Discharge to home or other facility with appropriate resources  Outcome: Progressing     Problem: Chronic Conditions and Co-morbidities  Goal: Patient's chronic conditions and co-morbidity symptoms are monitored and maintained or improved  Outcome: Progressing  Flowsheets (Taken 6/29/2024 0341)  Care Plan - Patient's Chronic Conditions and Co-Morbidity Symptoms are Monitored and Maintained or Improved:   Monitor and assess patient's chronic conditions and comorbid symptoms for stability, deterioration, or improvement   Collaborate with multidisciplinary team to address chronic and comorbid conditions and prevent exacerbation or deterioration   Update acute care plan with appropriate goals if chronic or comorbid symptoms are exacerbated and prevent overall improvement and discharge     Problem: Skin  Goal: Decreased wound size/increased tissue granulation at next dressing change  Outcome: Progressing  Goal: Participates in plan/prevention/treatment measures  Outcome: Progressing  Goal: Prevent/manage excess moisture  Outcome: Progressing  Goal: Prevent/minimize sheer/friction injuries  Outcome: Progressing  Goal: Promote/optimize nutrition  Outcome: Progressing  Goal: Promote skin healing  Outcome: Progressing     Problem: Pain  Goal: Takes deep breaths with improved pain control throughout the shift  Outcome: Progressing  Goal: Turns in bed with improved pain control throughout the shift  Outcome: Progressing  Goal: Walks with improved pain control throughout the shift  Outcome: Progressing  Goal: Performs ADL's with improved pain control throughout shift  Outcome: Progressing  Goal: Participates in PT with improved pain control throughout the shift  Outcome: Progressing  Goal:  Free from opioid side effects throughout the shift  Outcome: Progressing  Goal: Free from acute confusion related to pain meds throughout the shift  Outcome: Progressing     Problem: Resident is recovering from cardiovascular surgery  Goal: I will maintain and build strength.  Outcome: Progressing  Goal: I will decrease complications and risks after surgery  Outcome: Progressing     Problem: Indwelling Catheter Maintenance  Goal: I will have no complications from indwelling catheter  Outcome: Progressing     Problem: Respiratory  Goal: Clear secretions with interventions this shift  Outcome: Progressing  Goal: Minimize anxiety/maximize coping throughout shift  Outcome: Progressing  Goal: Minimal/no exertional discomfort or dyspnea this shift  Outcome: Progressing  Goal: No signs of respiratory distress (eg. Use of accessory muscles. Peds grunting)  Outcome: Progressing  Goal: Patent airway maintained this shift  Outcome: Progressing  Goal: Increase self care and/or family involvement in next 24 hours  Outcome: Progressing   The patient's goals for the shift include      The clinical goals for the shift include Patient will remain hemodynamically stable throughout shift    Over the shift, the patient did make progress toward goals. Continue plan of care

## 2024-07-01 ENCOUNTER — APPOINTMENT (OUTPATIENT)
Dept: CARDIOLOGY | Facility: HOSPITAL | Age: 54
End: 2024-07-01
Payer: MEDICAID

## 2024-07-01 ENCOUNTER — APPOINTMENT (OUTPATIENT)
Dept: RADIOLOGY | Facility: HOSPITAL | Age: 54
End: 2024-07-01
Payer: MEDICAID

## 2024-07-01 DIAGNOSIS — Z95.1 S/P CABG (CORONARY ARTERY BYPASS GRAFT): ICD-10-CM

## 2024-07-01 LAB
ALBUMIN SERPL BCP-MCNC: 3.8 G/DL (ref 3.4–5)
ANION GAP SERPL CALC-SCNC: 14 MMOL/L (ref 10–20)
ATRIAL RATE: 92 BPM
BUN SERPL-MCNC: 23 MG/DL (ref 6–23)
CA-I BLD-SCNC: 1.15 MMOL/L (ref 1.1–1.33)
CALCIUM SERPL-MCNC: 9.5 MG/DL (ref 8.6–10.3)
CHLORIDE SERPL-SCNC: 99 MMOL/L (ref 98–107)
CO2 SERPL-SCNC: 25 MMOL/L (ref 21–32)
CREAT SERPL-MCNC: 0.98 MG/DL (ref 0.5–1.3)
EGFRCR SERPLBLD CKD-EPI 2021: >90 ML/MIN/1.73M*2
EJECTION FRACTION APICAL 4 CHAMBER: 46.3
EJECTION FRACTION: 43 %
ERYTHROCYTE [DISTWIDTH] IN BLOOD BY AUTOMATED COUNT: 13.6 % (ref 11.5–14.5)
GLUCOSE BLD MANUAL STRIP-MCNC: 116 MG/DL (ref 74–99)
GLUCOSE BLD MANUAL STRIP-MCNC: 117 MG/DL (ref 74–99)
GLUCOSE BLD MANUAL STRIP-MCNC: 125 MG/DL (ref 74–99)
GLUCOSE BLD MANUAL STRIP-MCNC: 126 MG/DL (ref 74–99)
GLUCOSE BLD MANUAL STRIP-MCNC: 145 MG/DL (ref 74–99)
GLUCOSE SERPL-MCNC: 134 MG/DL (ref 74–99)
HCT VFR BLD AUTO: 44.8 % (ref 41–52)
HGB BLD-MCNC: 15.1 G/DL (ref 13.5–17.5)
LV EJECTION FRACTION BIPLANE: 44 %
MAGNESIUM SERPL-MCNC: 2 MG/DL (ref 1.6–2.4)
MCH RBC QN AUTO: 28.2 PG (ref 26–34)
MCHC RBC AUTO-ENTMCNC: 33.7 G/DL (ref 32–36)
MCV RBC AUTO: 84 FL (ref 80–100)
NRBC BLD-RTO: 0 /100 WBCS (ref 0–0)
P AXIS: 46 DEGREES
P OFFSET: 201 MS
P ONSET: 138 MS
PHOSPHATE SERPL-MCNC: 4.8 MG/DL (ref 2.5–4.9)
PLATELET # BLD AUTO: 196 X10*3/UL (ref 150–450)
POTASSIUM SERPL-SCNC: 3.7 MMOL/L (ref 3.5–5.3)
PR INTERVAL: 146 MS
Q ONSET: 211 MS
QRS COUNT: 16 BEATS
QRS DURATION: 106 MS
QT INTERVAL: 390 MS
QTC CALCULATION(BAZETT): 482 MS
QTC FREDERICIA: 449 MS
R AXIS: -4 DEGREES
RBC # BLD AUTO: 5.36 X10*6/UL (ref 4.5–5.9)
SODIUM SERPL-SCNC: 134 MMOL/L (ref 136–145)
T AXIS: 122 DEGREES
T OFFSET: 406 MS
VENTRICULAR RATE: 92 BPM
WBC # BLD AUTO: 15.8 X10*3/UL (ref 4.4–11.3)

## 2024-07-01 PROCEDURE — 1100000001 HC PRIVATE ROOM DAILY

## 2024-07-01 PROCEDURE — 2500000001 HC RX 250 WO HCPCS SELF ADMINISTERED DRUGS (ALT 637 FOR MEDICARE OP)

## 2024-07-01 PROCEDURE — 2500000004 HC RX 250 GENERAL PHARMACY W/ HCPCS (ALT 636 FOR OP/ED): Performed by: NURSE PRACTITIONER

## 2024-07-01 PROCEDURE — 2500000005 HC RX 250 GENERAL PHARMACY W/O HCPCS

## 2024-07-01 PROCEDURE — 2500000002 HC RX 250 W HCPCS SELF ADMINISTERED DRUGS (ALT 637 FOR MEDICARE OP, ALT 636 FOR OP/ED): Performed by: NURSE PRACTITIONER

## 2024-07-01 PROCEDURE — 2500000005 HC RX 250 GENERAL PHARMACY W/O HCPCS: Performed by: STUDENT IN AN ORGANIZED HEALTH CARE EDUCATION/TRAINING PROGRAM

## 2024-07-01 PROCEDURE — 2500000005 HC RX 250 GENERAL PHARMACY W/O HCPCS: Performed by: NURSE PRACTITIONER

## 2024-07-01 PROCEDURE — 97535 SELF CARE MNGMENT TRAINING: CPT | Mod: GO,CO

## 2024-07-01 PROCEDURE — 82330 ASSAY OF CALCIUM: CPT | Performed by: NURSE PRACTITIONER

## 2024-07-01 PROCEDURE — 2500000001 HC RX 250 WO HCPCS SELF ADMINISTERED DRUGS (ALT 637 FOR MEDICARE OP): Performed by: NURSE PRACTITIONER

## 2024-07-01 PROCEDURE — 71045 X-RAY EXAM CHEST 1 VIEW: CPT | Performed by: RADIOLOGY

## 2024-07-01 PROCEDURE — 99233 SBSQ HOSP IP/OBS HIGH 50: CPT | Performed by: INTERNAL MEDICINE

## 2024-07-01 PROCEDURE — 83735 ASSAY OF MAGNESIUM: CPT | Performed by: NURSE PRACTITIONER

## 2024-07-01 PROCEDURE — 99233 SBSQ HOSP IP/OBS HIGH 50: CPT | Performed by: NURSE PRACTITIONER

## 2024-07-01 PROCEDURE — 71045 X-RAY EXAM CHEST 1 VIEW: CPT

## 2024-07-01 PROCEDURE — 93308 TTE F-UP OR LMTD: CPT | Performed by: INTERNAL MEDICINE

## 2024-07-01 PROCEDURE — 97530 THERAPEUTIC ACTIVITIES: CPT | Mod: GP,CQ

## 2024-07-01 PROCEDURE — 99232 SBSQ HOSP IP/OBS MODERATE 35: CPT

## 2024-07-01 PROCEDURE — 82947 ASSAY GLUCOSE BLOOD QUANT: CPT

## 2024-07-01 PROCEDURE — 36415 COLL VENOUS BLD VENIPUNCTURE: CPT | Performed by: NURSE PRACTITIONER

## 2024-07-01 PROCEDURE — 2500000001 HC RX 250 WO HCPCS SELF ADMINISTERED DRUGS (ALT 637 FOR MEDICARE OP): Performed by: STUDENT IN AN ORGANIZED HEALTH CARE EDUCATION/TRAINING PROGRAM

## 2024-07-01 PROCEDURE — 85027 COMPLETE CBC AUTOMATED: CPT | Performed by: NURSE PRACTITIONER

## 2024-07-01 PROCEDURE — 97530 THERAPEUTIC ACTIVITIES: CPT | Mod: GO,CO

## 2024-07-01 PROCEDURE — 93010 ELECTROCARDIOGRAM REPORT: CPT | Performed by: INTERNAL MEDICINE

## 2024-07-01 PROCEDURE — 80069 RENAL FUNCTION PANEL: CPT | Performed by: NURSE PRACTITIONER

## 2024-07-01 PROCEDURE — 93308 TTE F-UP OR LMTD: CPT

## 2024-07-01 PROCEDURE — 93005 ELECTROCARDIOGRAM TRACING: CPT

## 2024-07-01 RX ORDER — POTASSIUM CHLORIDE 20 MEQ/1
20 TABLET, EXTENDED RELEASE ORAL ONCE
Status: COMPLETED | OUTPATIENT
Start: 2024-07-01 | End: 2024-07-01

## 2024-07-01 RX ORDER — FUROSEMIDE 10 MG/ML
40 INJECTION INTRAMUSCULAR; INTRAVENOUS ONCE
Status: COMPLETED | OUTPATIENT
Start: 2024-07-01 | End: 2024-07-01

## 2024-07-01 ASSESSMENT — COGNITIVE AND FUNCTIONAL STATUS - GENERAL
HELP NEEDED FOR BATHING: A LITTLE
MOVING TO AND FROM BED TO CHAIR: A LITTLE
MOBILITY SCORE: 17
DRESSING REGULAR UPPER BODY CLOTHING: A LITTLE
DRESSING REGULAR UPPER BODY CLOTHING: A LITTLE
DAILY ACTIVITIY SCORE: 17
CLIMB 3 TO 5 STEPS WITH RAILING: A LITTLE
HELP NEEDED FOR BATHING: A LITTLE
TOILETING: A LITTLE
DRESSING REGULAR LOWER BODY CLOTHING: A LITTLE
PERSONAL GROOMING: A LITTLE
EATING MEALS: A LITTLE
TURNING FROM BACK TO SIDE WHILE IN FLAT BAD: A LOT
DAILY ACTIVITIY SCORE: 19
TOILETING: A LITTLE
CLIMB 3 TO 5 STEPS WITH RAILING: A LITTLE
DRESSING REGULAR LOWER BODY CLOTHING: A LOT
WALKING IN HOSPITAL ROOM: A LITTLE
PERSONAL GROOMING: A LITTLE
TURNING FROM BACK TO SIDE WHILE IN FLAT BAD: A LITTLE
MOVING TO AND FROM BED TO CHAIR: A LITTLE
STANDING UP FROM CHAIR USING ARMS: A LITTLE
MOVING FROM LYING ON BACK TO SITTING ON SIDE OF FLAT BED WITH BEDRAILS: A LITTLE
STANDING UP FROM CHAIR USING ARMS: A LITTLE
WALKING IN HOSPITAL ROOM: A LITTLE
MOBILITY SCORE: 19

## 2024-07-01 ASSESSMENT — PAIN - FUNCTIONAL ASSESSMENT
PAIN_FUNCTIONAL_ASSESSMENT: 0-10

## 2024-07-01 ASSESSMENT — PAIN SCALES - GENERAL
PAINLEVEL_OUTOF10: 0 - NO PAIN

## 2024-07-01 ASSESSMENT — ACTIVITIES OF DAILY LIVING (ADL)
LACK_OF_TRANSPORTATION: NO
HOME_MANAGEMENT_TIME_ENTRY: 15

## 2024-07-01 NOTE — PROGRESS NOTES
.[facility] Critical Care Medicine       Date:  7/1/2024  Patient:  Kamar Garcia  YOB: 1970  MRN:  [id number]   Admit Date:  6/28/2024  ========================================================================================================      History of Present Illness:  Kamar Garcia is a 53 y.o. year old male patient with Past Medical History of HTN, hypothyroidism, HFrEF (35-40%), HLD, prior CVA was evaluated in June by Dr. Lovell for surgical revascularization. He had stroke in June that prompted a LHC by echocardiogram. His LHC showed multivessel coronary artery disease including his LAD, D1, Circumflex, OM1, OM2 and RCA. In his best interest, patient opted for surgical revascularization.         Interval ICU Events:  6/28: Patient underwent CABG x 3 (LIMA-DIAG-LAD, SVG-OM2), Right Leg EVH and median sternotomy. Cardiopulmonary Bypass Time 118 min, XC time 95 min. Given 2200 ml of crystalloid fluid, 777 Cell Saver. UOP during procedure 1200mls. R and left Pleural Chest Tubes and 1 Mediastinal Chest tubes each with minimal sanguinous drainage. Required x 4 shocks s/p spb. Returned to ICU on nitroglycerin and epinephrine gtt due to low index    6/29: Off epinephrine gtt this am. Up to chair, minimal chest tube output overnight. Pain well controlled. Will plan to start BB this am, along with 40mg IV lasix, will add colchicine as well for pericardial rub. Plan to ambulate patient this am and remove chest tubes if no increase in output afterwards.     6/30: Continued off all vasoactive medications. Chest tubes removed, will removed cordis today and plan for transfer. Will start entresto today pending cardiology recommendations     7/1: Started entresto yesterday, diuresing well UOP 2.4L yesterday. Will plan to diurese again today. Cordis removed, V wires may insulate. Transfer out of ICU today        Medical History:  Past Medical History:   Diagnosis Date    Coronary artery disease     COVID-19      NOT VACCINATED    Dyslipidemia     HTN (hypertension)     Hyperlipidemia     Hypothyroidism     Stroke (Multi)      Past Surgical History:   Procedure Laterality Date    CARDIAC CATHETERIZATION      HERNIA REPAIR Right     inguinal     Medications Prior to Admission   Medication Sig Dispense Refill Last Dose    aspirin 81 mg EC tablet Take 1 tablet (81 mg) by mouth early in the morning..   6/28/2024 at 0300    atorvastatin (Lipitor) 40 mg tablet Take 1 tablet (40 mg) by mouth early in the morning..   6/27/2024    furosemide (Lasix) 40 mg tablet Take 1 tablet (40 mg) by mouth early in the morning..   6/27/2024    levothyroxine (Synthroid, Levoxyl) 50 mcg tablet Take 1 tablet (50 mcg) by mouth early in the morning..   6/27/2024    lisinopril 10 mg tablet Take 1 tablet (10 mg) by mouth early in the morning..   Past Week    metoprolol succinate XL (Toprol-XL) 50 mg 24 hr tablet Take 1 tablet (50 mg) by mouth early in the morning..   6/28/2024 at 0300    mupirocin (Bactroban) 2 % ointment Apply topically. Apply to both nostrils twice daily 5 days before surgery   6/27/2024     Patient has no known allergies.  Social History     Tobacco Use    Smoking status: Former     Types: Cigarettes    Smokeless tobacco: Former     Types: Snuff   Vaping Use    Vaping status: Never Used   Substance Use Topics    Alcohol use: Never    Drug use: Never     Family History   Problem Relation Name Age of Onset    Pancreatic cancer Mother      COPD Father         Review of Systems:  14 point review of systems was completed and negative except for those specially mention in my HPI    Physical Exam:    Heart Rate:  []   Temp:  [37 °C (98.6 °F)-37.4 °C (99.3 °F)]   Resp:  [18-34]   BP: (111-167)/(32-98)   Weight:  [116 kg (254 lb 13.6 oz)]   SpO2:  [91 %-96 %]     Physical Exam  Vitals reviewed.   Constitutional:       General: He is awake.      Appearance: He is overweight.   HENT:      Head: Normocephalic and atraumatic.    Cardiovascular:      Rate and Rhythm: Normal rate and regular rhythm.      Pulses: Normal pulses.      Heart sounds: Heart sounds not distant. No murmur heard.     Friction rub present.   Pulmonary:      Effort: Pulmonary effort is normal.      Breath sounds: Examination of the right-upper field reveals rhonchi. Examination of the left-upper field reveals rhonchi. Rhonchi present.   Chest:      Comments: Medial Sternotomy Incision CDI   Abdominal:      General: Abdomen is flat. Bowel sounds are decreased.      Palpations: Abdomen is soft.      Tenderness: There is no abdominal tenderness.   Musculoskeletal:      Cervical back: Full passive range of motion without pain.      Right lower leg: No edema.      Left lower leg: No edema.   Skin:     General: Skin is warm.      Capillary Refill: Capillary refill takes less than 2 seconds.      Comments: Ev CDI    Neurological:      Mental Status: He is alert and oriented to person, place, and time.   Psychiatric:         Behavior: Behavior is cooperative.         Objective:    XR chest 1 view    Result Date: 7/1/2024  Interpreted By:  Jignesh Cortez, STUDY: XR CHEST 1 VIEW;  7/1/2024 5:07 am   INDICATION: Signs/Symptoms:s/p CABG.   COMPARISON: 06/30/2024.   ACCESSION NUMBER(S): [id number]   ORDERING CLINICIAN: KENN HERNANDEZ   FINDINGS: CARDIOMEDIASTINAL SILHOUETTE: Cardiomegaly, aortic prominence with calcification and postoperative changes of the mediastinum are similar to prior.   LUNGS: Left basilar atelectasis versus small infiltrate persists. Right infrahilar mild atelectasis also seen. No definite pleural effusion. No appreciable pneumothorax.   ABDOMEN: No remarkable upper abdominal findings.   BONES: Bones are stable.       1.  Left basilar atelectasis versus small infiltrate. 2. Right infrahilar mild atelectasis.       MACRO: None.   Signed by: Jignesh Cortez 7/1/2024 8:03 AM Dictation workstation:   [id number]    ECG 12 Lead    Result Date:  7/1/2024  Normal sinus rhythm Possible Left atrial enlargement Inferior infarct (cited on or before 30-JUN-2024) T wave abnormality, consider lateral ischemia ** ** ACUTE MI / STEMI ** ** Consider right ventricular involvement in acute inferior infarct Abnormal ECG When compared with ECG of 30-JUN-2024 06:46, Serial changes of Inferior infarct Present    ECG 12 Lead    Result Date: 6/30/2024  Normal sinus rhythm Possible Left atrial enlargement Inferior infarct , age undetermined Abnormal ECG When compared with ECG of 29-JUN-2024 06:50, (unconfirmed) No significant change was found Confirmed by Alexa Jimenez ([id number]) on 6/30/2024 11:53:07 AM    XR chest 1 view    Result Date: 6/30/2024  Interpreted By:  Jignesh Cortez, STUDY: XR CHEST 1 VIEW;  6/30/2024 5:30 am   INDICATION: Signs/Symptoms:s/p CABG.   COMPARISON: 06/29/2024.   ACCESSION NUMBER(S): [id number]   ORDERING CLINICIAN: KENN HERNANDEZ   FINDINGS: CARDIOMEDIASTINAL SILHOUETTE: Right jugular central line remains in place with tubing tip at the SVC level. Cardiomegaly, aortic prominence with calcification and postoperative changes of the mediastinum are similar to prior.   LUNGS: Inspiratory volume is low. Streaky atelectasis and/or infiltrate is seen in the left perihilar region and base. No right lung consolidation. No definite pleural effusion. No appreciable pneumothorax.   ABDOMEN: No remarkable upper abdominal findings.   BONES: Bones are stable.       1.  Streaky atelectasis and or infiltrates of the left perihilar region and base.       MACRO: None.   Signed by: Jignesh Cortez 6/30/2024 10:00 AM Dictation workstation:   [id number]    XR chest 1 view    Result Date: 6/29/2024  Interpreted By:  Eleazar Coates, STUDY: XR CHEST 1 VIEW   INDICATION: Signs/Symptoms:Chest tube removal.   COMPARISON: June 29 earlier the same day   ACCESSION NUMBER(S): [id number]   ORDERING CLINICIAN: BEN JURADO   FINDINGS: Interval removal of the chest tubes. No  consolidation, effusion, edema, or pneumothorax. Small left effusion and basilar atelectasis.         Interval chest tube removal without pneumothorax.   Signed by: Eleazar Coates 6/29/2024 4:37 PM Dictation workstation:   [id number]    XR chest 1 view    Result Date: 6/29/2024  Interpreted By:  Jignesh Cortez, STUDY: XR CHEST 1 VIEW;  6/29/2024 10:53 am   INDICATION: Signs/Symptoms:s/p CABG.   COMPARISON: 06/28/2024.   ACCESSION NUMBER(S): [id number]   ORDERING CLINICIAN: KENN HERNANDEZ   FINDINGS: CARDIOMEDIASTINAL SILHOUETTE: Endotracheal tube and NG tube are no longer seen. A [place]-Mohamud catheter from right jugular approach remains in place and is visualized to the right atrium level. The distal segment of the [place]-Mohamud catheter including the tip is not well visualized due to obscuration by overlying median sternotomy hardware. A cephalad directed probable mediastinal drain remains in place. Cardiomegaly, aortic prominence with calcification and postoperative changes of the mediastinum are again seen.   LUNGS: Inspiratory volume is low. Left basilar chest tube remains in place. Left basilar atelectasis versus small infiltrate is present. No appreciable pneumothorax.   ABDOMEN: No remarkable upper abdominal findings.   BONES: Bones are stable.       1.  Postoperative changes of the mediastinum with residual life-support lines and tubes as detailed. Distal segment of the [place]-Mohamud catheter including the tip are not well visualized due to obscuration from the median sternotomy hardware. 2. Low inspiratory volume with left basilar atelectasis versus infiltrate.       MACRO: None.   Signed by: Jignesh Cortez 6/29/2024 10:59 AM Dictation workstation:   [id number]      Results for orders placed or performed during the hospital encounter of 06/28/24 (from the past 24 hour(s))   POCT GLUCOSE   Result Value Ref Range    POCT Glucose 118 (H) 74 - 99 mg/dL   POCT GLUCOSE   Result Value Ref Range    POCT Glucose 132 (H)  74 - 99 mg/dL   POCT GLUCOSE   Result Value Ref Range    POCT Glucose 114 (H) 74 - 99 mg/dL   Calcium, Ionized   Result Value Ref Range    POCT Calcium, Ionized 1.15 1.1 - 1.33 mmol/L   Magnesium   Result Value Ref Range    Magnesium 2.00 1.60 - 2.40 mg/dL   CBC   Result Value Ref Range    WBC 15.8 (H) 4.4 - 11.3 x10*3/uL    nRBC 0.0 0.0 - 0.0 /100 WBCs    RBC 5.36 4.50 - 5.90 x10*6/uL    Hemoglobin 15.1 13.5 - 17.5 g/dL    Hematocrit 44.8 41.0 - 52.0 %    MCV 84 80 - 100 fL    MCH 28.2 26.0 - 34.0 pg    MCHC 33.7 32.0 - 36.0 g/dL    RDW 13.6 11.5 - 14.5 %    Platelets 196 150 - 450 x10*3/uL   Renal Function Panel   Result Value Ref Range    Glucose 134 (H) 74 - 99 mg/dL    Sodium 134 (L) 136 - 145 mmol/L    Potassium 3.7 3.5 - 5.3 mmol/L    Chloride 99 98 - 107 mmol/L    Bicarbonate 25 21 - 32 mmol/L    Anion Gap 14 10 - 20 mmol/L    Urea Nitrogen 23 6 - 23 mg/dL    Creatinine 0.98 0.50 - 1.30 mg/dL    eGFR >90 >60 mL/min/1.73m*2    Calcium 9.5 8.6 - 10.3 mg/dL    Phosphorus 4.8 2.5 - 4.9 mg/dL    Albumin 3.8 3.4 - 5.0 g/dL   POCT GLUCOSE   Result Value Ref Range    POCT Glucose 145 (H) 74 - 99 mg/dL   ECG 12 Lead   Result Value Ref Range    Ventricular Rate 92 BPM    Atrial Rate 92 BPM    ID Interval 146 ms    QRS Duration 106 ms    QT Interval 390 ms    QTC Calculation(Bazett) 482 ms    P Axis 46 degrees    R Axis -4 degrees    T Axis 122 degrees    QRS Count 16 beats    Q Onset 211 ms    P Onset 138 ms    P Offset 201 ms    T Offset 406 ms    QTC Fredericia 449 ms   POCT GLUCOSE   Result Value Ref Range    POCT Glucose 125 (H) 74 - 99 mg/dL   Assessment/Plan:     I am currently managing this critically ill patient for the following problems:     Neuro/Psych/Pain Ctrl/Sedation:  #Post-Op Pain  #Hx CVA  -Neuro checks, CAM Assessment, Delirium precautions  -PRN Dilaudid, oxycodone, Tylenol for pain control  -Avoid Toradol on POD 0, can consider POD 1  -Lidocaine patches     Respiratory/ENT:  #Post-Op Respiratory  "Insufficiency  -Wean supplemental oxygen for spO2 goal > 92%  -ABG as needed  -Daily CXR     Cardiovascular:  #Triple Vessel CAD s/p CABG x 3  Patient underwent CABG x 3 (LIMA-DIAG-LAD, SVG-OM2), Right Leg EVH and median sternotomy.   -V wires in place not being paced   -Daily BMP, keep K >4, Mg > 2  -Start aspirin and statin POD #0  -Will give additional 40 IV lasix today,  continue metoprolol 25 mg BID, Continue colchcine   -Started entresto 24-26  -Takes lisinopril at home however plan to start entresto. Will need to discontinue his home lisinopril at discharge    -Roxana-op Ancef x 48 hours  -PT/OT consult and OOB POD #1  -CVS following and appreciate further management     GI:  -PPI     Renal/Volume Status (Intra & Extravascular):  No acute issues  No CKD at baseline   Monitor RFP    Endocrine  #Hyperglycemia  -No history of DM, likely stress induced  -Strict blood glucose control post-operatively  -Glucose goal   -Insulin gtt vs SSI per ICU protocol     Infectious Disease:  -Perioperative Ancef x 48 hours     Heme/Onc:  #Acute Blood Loss Anemia Post-Op  -Baseline Hgb 14.0  -Daily CBC  -Transfuse for Hgb goal > 7 or massive blood loss with hemodynamic instability     ORTHO/MSK:  -PT/OT, Cardiac Rehab POD #1     Ethics/Code Status:  FULL CODE     :  DVT Prophylaxis: Heparin subcu   GI Prophylaxis: PO PPI  Bowel Regimen: Docusate, Senna, Miralax  Diet: Cardiac Diet   CVC: removed   [place]: removed  [place]: Remove   Restraints: yes  Dispo: plan to downgrade to telemetry floor today      [facility] Cardiothoracic Surgery/ICU Quality Check      New Onset Organ Failure (TIN, Shock, ALI etc): none  >2 Vasopressors/Inotropes (Levophed > 0.1mcg/kg/min + Vasopressin) for more than 8 hours: none  Ongoing Blood Product Transfusion: none  Sepsis/New Infection: none  Delirium: none  Readmission to ICU: n/a  Family/Patient Concerns: none    If any \"yes\" to the above, action plan as noted below:    None  "     Critical Care Time: 35    Plan Discussed with Dr. Osborn and CTS     Bia ANDRE, CNP  Critical Care Medicine   [facility]

## 2024-07-01 NOTE — PROGRESS NOTES
[facility] Progress Note           Rounding Cardiologist:  Dr. Gonzalez/Kamar Buchanan, APRN-CNP    Date:  7/1/2024  Patient:  Kamar Garcia  YOB: 1970  MRN:  [id number]   Admit Date:  6/28/2024      SUBJECTIVE:    Kamar Garcia was seen and examined today at bedside.   He is postop day #3 s/p CABG x 3 with LIMA to diagonal-LAD, vein graft to OM 2  Denies any chest pain or shortness of breath    Vitals:    07/01/24 0700 07/01/24 0800 07/01/24 0900 07/01/24 1000   BP: 141/81 141/90  111/62   BP Location:       Patient Position:       Pulse: 94 98 (!) 116 78   Resp: 23 (!) 32 (!) 41 25   Temp:       TempSrc:       SpO2: 93% 95% 92% 95%   Weight:       Height:           Intake/Output Summary (Last 24 hours) at 7/1/2024 1040  Last data filed at 7/1/2024 0700  Gross per 24 hour   Intake 760 ml   Output 1900 ml   Net -1140 ml       Wt Readings from Last 4 Encounters:   06/30/24 116 kg (254 lb 13.6 oz)   06/11/24 112 kg (246 lb)       CURRENT HOSPITAL MEDICATIONS:   acetaminophen, 650 mg, oral, q6h  aspirin, 81 mg, oral, Daily  atorvastatin, 40 mg, oral, Nightly  colchicine, 0.6 mg, oral, Daily  docusate sodium, 100 mg, oral, TID  furosemide, 40 mg, intravenous, Once  heparin (porcine), 5,000 Units, subcutaneous, q8h  insulin lispro, 0-15 Units, subcutaneous, Before meals & nightly  levothyroxine, 50 mcg, oral, Daily  lidocaine, 1 patch, transdermal, Daily  methocarbamol, 500 mg, oral, q8h CLAUDIA  metoprolol tartrate, 25 mg, oral, BID  mupirocin, 0.5 Application, Topical, BID  oxygen, , inhalation, Continuous - Inhalation  pantoprazole, 40 mg, oral, Daily before breakfast   Or  pantoprazole, 40 mg, intravenous, Daily before breakfast  polyethylene glycol, 17 g, oral, BID  potassium chloride CR, 20 mEq, oral, Once  sacubitriL-valsartan, 1 tablet, oral, BID  simethicone, 80 mg, oral, 4x daily           PHYSICAL EXAMINATION:   GENERAL:  Well developed, well nourished, in no acute distress.  CHEST:  Symmetric and  "nontender.  NEURO/PSYCH:  Alert and oriented times three with approppriate behavior and responses.  NECK:  Supple, no JVD, no bruit.  LUNGS:  Clear to auscultation bilaterally, normal respiratory effort.  HEART:  Rate and rhythm regular with no evident murmur, no gallop appreciated.        There are no rubs, clicks or heaves.  EXTREMITIES:  Warm with good color, no clubbing or cyanosis.  There is no edema noted.  PERIPHERAL VASCULAR:  Pulses present and equally palpable; 2+ throughout.      LAB DATA:     CBC:   Results from last 7 days   Lab Units 07/01/24  0429   WBC AUTO x10*3/uL 15.8*   RBC AUTO x10*6/uL 5.36   HEMOGLOBIN g/dL 15.1   HEMATOCRIT % 44.8   PLATELETS AUTO x10*3/uL 196        CMP:    Results from last 7 days   Lab Units 07/01/24  0429   SODIUM mmol/L 134*   POTASSIUM mmol/L 3.7   CHLORIDE mmol/L 99   CO2 mmol/L 25   BUN mg/dL 23   CREATININE mg/dL 0.98   GLUCOSE mg/dL 134*   CALCIUM mg/dL 9.5       Cardiac Enzymes:  No results found for: \"TROPHS\"    Magnesium:    Lab Results   Component Value Date    MG 2.00 07/01/2024       Lipid Profile:  No results found for: \"CHLPL\", \"TRIG\", \"HDL\", \"LDLCALC\", \"LDLDIRECT\"    TSH:  No results found for: \"TSH\"    BNP: No results found for: \"BNP\"     PT/INR:    Lab Results   Component Value Date    PROTIME 12.0 06/28/2024    INR 1.1 06/28/2024       HgBA1c:  No results found for: \"HGBA1C\"    CBC:  Lab Results   Component Value Date    WBC 15.8 (H) 07/01/2024    WBC 16.2 (H) 06/30/2024    WBC 14.0 (H) 06/29/2024    RBC 5.36 07/01/2024    RBC 5.04 06/30/2024    RBC 5.15 06/29/2024    HGB 15.1 07/01/2024    HGB 14.1 06/30/2024    HGB 14.3 06/29/2024    HCT 44.8 07/01/2024    HCT 42.6 06/30/2024    HCT 42.8 06/29/2024    MCV 84 07/01/2024    MCV 85 06/30/2024    MCV 83 06/29/2024    MCH 28.2 07/01/2024    MCH 28.0 06/30/2024    MCH 27.8 06/29/2024    MCHC 33.7 07/01/2024    MCHC 33.1 06/30/2024    MCHC 33.4 06/29/2024    RDW 13.6 07/01/2024    RDW 13.6 06/30/2024    RDW " "13.6 06/29/2024     07/01/2024     06/30/2024     06/29/2024       BMP:  Lab Results   Component Value Date     (L) 07/01/2024     (L) 06/30/2024     (L) 06/29/2024    K 3.7 07/01/2024    K 4.2 06/30/2024    K 4.8 06/29/2024    CL 99 07/01/2024    CL 99 06/30/2024     06/29/2024    CO2 25 07/01/2024    CO2 29 06/30/2024    CO2 23 06/29/2024    BUN 23 07/01/2024    BUN 19 06/30/2024    BUN 20 06/29/2024    CREATININE 0.98 07/01/2024    CREATININE 1.14 06/30/2024    CREATININE 1.15 06/29/2024       Hepatic Function Panel:  No results found for: \"ALKPHOS\", \"ALT\", \"AST\", \"PROT\", \"BILITOT\", \"BILIDIR\"    DIAGNOSTIC TESTING:     ECG 12 Lead    Result Date: 6/30/2024  Normal sinus rhythm Normal ECG When compared with ECG of 28-JUN-2024 23:29, (unconfirmed) No significant change was found Confirmed by Alexa Jimenez [id number]) on 6/30/2024 11:52:13 AM    Electrocardiogram 12-lead PRN for arrhythmia    Result Date: 6/30/2024  Normal sinus rhythm Possible Left atrial enlargement Inferior infarct , age undetermined Abnormal ECG When compared with ECG of 28-JUN-2024 14:04, No significant change was found Confirmed by Alexa Jimenez [id number]) on 6/30/2024 11:51:25 AM    XR chest 1 view    Result Date: 6/29/2024  Interpreted By:  Eleazar Coates, STUDY: XR CHEST 1 VIEW   INDICATION: Signs/Symptoms:Chest tube removal.   COMPARISON: June 29 earlier the same day   ACCESSION NUMBER(S): [id number]   ORDERING CLINICIAN: BEN JURADO   FINDINGS: Interval removal of the chest tubes. No consolidation, effusion, edema, or pneumothorax. Small left effusion and basilar atelectasis.         Interval chest tube removal without pneumothorax.   Signed by: Eleazar Coates 6/29/2024 4:37 PM Dictation workstation:   [id number]    XR chest 1 view    Result Date: 6/29/2024  Interpreted By:  Jignesh Cortez, STUDY: XR CHEST 1 VIEW;  6/29/2024 10:53 am   INDICATION: Signs/Symptoms:s/p CABG.   COMPARISON: 06/28/2024.   " ACCESSION NUMBER(S): [id number]   ORDERING CLINICIAN: KENN HERNANDEZ   FINDINGS: CARDIOMEDIASTINAL SILHOUETTE: Endotracheal tube and NG tube are no longer seen. A Gilroy-Mohamud catheter from right jugular approach remains in place and is visualized to the right atrium level. The distal segment of the Gilroy-Mohamud catheter including the tip is not well visualized due to obscuration by overlying median sternotomy hardware. A cephalad directed probable mediastinal drain remains in place. Cardiomegaly, aortic prominence with calcification and postoperative changes of the mediastinum are again seen.   LUNGS: Inspiratory volume is low. Left basilar chest tube remains in place. Left basilar atelectasis versus small infiltrate is present. No appreciable pneumothorax.   ABDOMEN: No remarkable upper abdominal findings.   BONES: Bones are stable.       1.  Postoperative changes of the mediastinum with residual life-support lines and tubes as detailed. Distal segment of the Gilroy-Mohamud catheter including the tip are not well visualized due to obscuration from the median sternotomy hardware. 2. Low inspiratory volume with left basilar atelectasis versus infiltrate.       MACRO: None.   Signed by: Jignesh Cortez 6/29/2024 10:59 AM Dictation workstation:   [id number]    ECG 12 Lead    Result Date: 6/28/2024  Normal sinus rhythm Prolonged QT Abnormal ECG No previous ECGs available Confirmed by Alexa Jimenez ([id number]) on 6/28/2024 6:19:22 PM    XR chest 1 view    Result Date: 6/28/2024  Interpreted By:  Schoenberger, Joseph, STUDY: XR CHEST 1 VIEW;  6/28/2024 2:31 pm   INDICATION: Signs/Symptoms:s/p cabg.   COMPARISON: Exam performed at 7:46 a.m.   ACCESSION NUMBER(S): [id number]   ORDERING CLINICIAN: BEN JURADO   FINDINGS: In the interval since the prior, the patient has undergone a median sternotomy. The Gilroy-Mohamud catheter tip may have advanced somewhat and is likely in the proximal left pulmonary artery. There is an endotracheal  tube with its tip near the level of thoracic inlet 4.8 cm above the francisca. The nasogastric tube is noted the with its distal tip below the gastroesophageal junction only slightly and likely should be advanced. There is a midline chest drain and left basal chest tube without evidence for pneumothorax.       CARDIOMEDIASTINAL SILHOUETTE: Cardiac silhouette is enlarged possibly exaggerated due to hypoventilatory exam and AP projection. No venous congestion.   LUNGS: There is new subsegmental atelectasis in the left lung base retrocardiac region. A very small amount left pleural effusion is a consideration.   ABDOMEN: No remarkable upper abdominal findings.   BONES: No acute osseous changes.       1.  Expected postoperative findings after median sternotomy and open heart surgery. See detailed discussion above. 2. The nasogastric tube only projects slightly below the gastroesophageal junction and should be advanced.       MACRO: None   Signed by: Joseph Schoenberger 6/28/2024 2:46 PM Dictation workstation:   [id number]    Anesthesia Intraoperative Transesophageal Echocardiogram    Result Date: 6/28/2024          [address] [address]  Tel [phone] Fax [phone] TRANSESOPHAGEAL ECHOCARDIOGRAM REPORT Patient Name:      ERWIN Gibbs Physician:    [id number] Anh Dhillon MD Study Date:        6/28/2024           Ordering Provider:    [id number] KENN HERNANDEZ MRN/PID:           [id number]            Fellow: Accession#:        [id number]        Nurse: Date of Birth/Age: 1970 / 53     Sonographer:          MD barclay Gender:            M                   Additional Staff: Height:            175.26 cm           Admit Date: Weight:            111.59 kg           Admission Status: BSA / BMI:         2.26 m2 / 36.33     Department  Location:                    kg/m2 Study Type:    ANESTHESIA INTRAOPERATIVE RENÉE Diagnosis/ICD: Coronary atherosclerosis due to calcified coronary lesion-I25.84  Study Detail: The following Echo studies were performed: 2D, M-Mode, Doppler and               color flow.  PHYSICIAN INTERPRETATION: RENÉE Details: Color flow Doppler echo was performed to assess for the presence of a patent foramen ovale. RENÉE Medication: The patient was sedated by Anesthesia; please refer to anesthesia flow sheet for medications used. RENÉE Procedure: The probe was passed without difficulty. Left Ventricle: The left ventricular systolic function is mildly decreased, with a visually estimated ejection fraction of 40-45%. Wall motion is abnormal. The left ventricular cavity size is normal. Spectral Doppler shows an impaired relaxation pattern of left ventricular diastolic filling. Left Atrium: The left atrium is normal in size. There is no evidence of a patent foramen ovale. There is no mass visualized in the left atrial appendage and there is no thrombus visualized in the left atrial appendage. Right Ventricle: The right ventricle is normal in size. There is normal right ventricular global systolic function. Right Atrium: The right atrium is normal in size. Aortic Valve: The aortic valve appears structurally normal. There is no evidence of aortic valve regurgitation. The peak instantaneous gradient of the aortic valve is 7.4 mmHg. The mean gradient of the aortic valve is 3.0 mmHg. Mitral Valve: The mitral valve is normal in structure. There is trace mitral valve regurgitation. Tricuspid Valve: The tricuspid valve is structurally normal. There is trace tricuspid regurgitation. Pulmonic Valve: The pulmonic valve is structurally normal. There is trace pulmonic valve regurgitation. Pericardium: There is no pericardial effusion noted. Aorta: The aortic root is normal.  CONCLUSIONS:  1. The left ventricular systolic function is mildly decreased, with  a visually estimated ejection fraction of 40-45%.  2. Spectral Doppler shows an impaired relaxation pattern of left ventricular diastolic filling.  3. There is normal right ventricular global systolic function.  4. Impaired relaxation with reversal of the E/A ratio.  5. Initially EF value is between 40 to 50%, postoperatively more in the 50%.  6. Pre-bypass that was noticeable inferior hypokinesia, but that was improved post bypass. QUANTITATIVE DATA SUMMARY: M-MODE MEASUREMENTS:                 Normal Ranges: IVSd:   3.55 cm (0.6-1.1cm) LVIDd:  5.34 cm (3.9-5.9cm) LVIDs:  3.94 cm LV % FS 26.3 % LV SYSTOLIC FUNCTION BY 2D PLANIMETRY (MOD):                      Normal Ranges: EF-A4C View:    50 % (>=55%) EF-Visual:      43 % LV EF Reported: 43 % LV DIASTOLIC FUNCTION:                         Normal Ranges: MV e'         0.210 m/s (>8.0) MV lateral e' 0.27 m/s MV medial e'  0.15 m/s AORTIC VALVE:                       Normal Ranges: AoV Vmax:    1.36 m/s (<=1.7m/s) AoV Peak P.4 mmHg (<20mmHg) AoV Mean PG: 3.0 mmHg (1.7-11.5mmHg) AoV VTI:     29.80 cm (18-25cm)  [id number] Troy Dhillon MD Electronically signed on 2024 at 2:09:04 PM  ** Final **     XR chest 1 view    Result Date: 2024  Interpreted By:  Gui Hernandez, STUDY: XR CHEST 1 VIEW;  2024 7:51 am   INDICATION: Signs/Symptoms:post swan placement.   COMPARISON: CT chest without contrast 2024   ACCESSION NUMBER(S): [id number]   ORDERING CLINICIAN: TROY DHILLON   TECHNIQUE: Single frontal view of the chest; Portable technique   FINDINGS: New right IJ approach pulmonary artery line tip is flipped projecting partially either anteriorly or posteriorly, overlies distal pulmonary outflow tract   No pneumothorax   No acute airspace disease   No large pleural effusion   The cardiomediastinal silhouette is unchanged       No pneumothorax with right IJ approach central line tip projecting over distal pulmonary outflow tract   MACRO: None   Signed by:  Gui Hernandez 6/28/2024 8:19 AM Dictation workstation:   [id number]       RADIOLOGY:     XR chest 1 view   Final Result   1.  Left basilar atelectasis versus small infiltrate.   2. Right infrahilar mild atelectasis.                  MACRO:   None.        Signed by: Jignesh Cortez 7/1/2024 8:03 AM   Dictation workstation:   [id number]      XR chest 1 view   Final Result   1.  Streaky atelectasis and or infiltrates of the left perihilar   region and base.                  MACRO:   None.        Signed by: Jignesh Cortez 6/30/2024 10:00 AM   Dictation workstation:   [id number]      XR chest 1 view   Final Result   Interval chest tube removal without pneumothorax.        Signed by: Eleazar Coates 6/29/2024 4:37 PM   Dictation workstation:   [id number]      XR chest 1 view   Final Result   1.  Postoperative changes of the mediastinum with residual   life-support lines and tubes as detailed. Distal segment of the   Corea-Mohamud catheter including the tip are not well visualized due to   obscuration from the median sternotomy hardware.   2. Low inspiratory volume with left basilar atelectasis versus   infiltrate.                  MACRO:   None.        Signed by: Jignesh Cortez 6/29/2024 10:59 AM   Dictation workstation:   [id number]      XR chest 1 view   Final Result   1.  Expected postoperative findings after median sternotomy and open   heart surgery. See detailed discussion above.   2. The nasogastric tube only projects slightly below the   gastroesophageal junction and should be advanced.                  MACRO:   None        Signed by: Joseph Schoenberger 6/28/2024 2:46 PM   Dictation workstation:   [id number]      XR chest 1 view   Final Result   No pneumothorax with right IJ approach central line tip projecting   over distal pulmonary outflow tract        MACRO:   None        Signed by: Gui Hernandez 6/28/2024 8:19 AM   Dictation workstation:   [id number]      Anesthesia Intraoperative Transesophageal  Echocardiogram   Final Result          PROBLEM LIST     Patient Active Problem List   Diagnosis    HTN (hypertension)    Dyslipidemia    CVA (cerebral vascular accident) (Multi)    LINA (obstructive sleep apnea)    Coronary artery disease of native artery of native heart with stable angina pectoris (CMS-HCC)    Cerebrovascular accident (CVA) due to thrombosis of right middle cerebral artery (Multi)    Cardiomyopathy (Multi)    Heart failure (Multi)    Hypothyroidism       ASSESSMENT & PLAN:    Severe triple-vessel disease s/p CABG x 3 as noted above with LIMA to LAD-diagonal and vein graft to the OM 2 with preop EF estimated to be in the 40 to 45% range, which improved to 50% range postoperatively on IntraOp RENÉE.  However, I do not think this i true reflection of his EF as the patient was most likely on pressors and inotropes when this echo was done.  Will recommend a repeat echocardiogram prior to discharge for reevaluation of his LV function.  His blood pressure has been running high and in view of his preop LV dysfunction, agree with plans to initiate Entresto 24-26 mg twice daily and uptitrate as blood pressure tolerates.  Hypertension: Blood pressure is fairly well-controlled on current medications.  Dyslipidemia: Resume high intensity lipid-lowering therapy.  AKA    7/1/24  Alert and oriented x 3.  Mentation appropriate  Remains on room air with good oxygen saturation  Chest x-ray stable with mild cardiomegaly  Continue diuresis, monitor strict I's and O's and daily weights.  Fluid balance -1.3 L  Postop pain well-controlled  Aggressive PT/OT  Remains in normal sinus rhythm with no ectopy  Okay for telemetry from general cardiology perspective  Repeat limited echocardiogram to evaluate LV function  Anticipate discharge in next 24 hours  Follow-up made for Dr. Cabrera from [facility]  General cardiology to sign off, please reach out for any further needs    Kamar Buchanan [facility]  Adult Gerontology Acute  Care Nurse Practitioner  [facility] [place]   [facility]  [phone]          Please do not hesitate to call with questions.  Electronically signed by JES Padilla-CNP, on 7/1/2024 at 10:40 AM     Patient seen and examined in conjunction with JES Mendoza/CNP and agree with the evaluation as noted above. Patient continues to do well, postop day #3 s/p CABG  With above maintaining sinus rhythm with no episodes of atrial fibrillation.  Blood pressure is much improved after initiation of Entresto.  Limited echo today shows mild LV dysfunction with EF in the 45% range.  We will continue with her current medications and possible discharge tomorrow per [facility] team.  AKA

## 2024-07-01 NOTE — PROGRESS NOTES
Kamar Garcia is a 53 y.o. male on day 3 of admission presenting with Coronary artery disease of native artery of native heart with stable angina pectoris (CMS-HCC).    Subjective   No acute overnight events.  This morning is afebrile, maintaining sinus rhythm, normotensive with adequate saturations on room air.  Chest x-ray is stable with mild left basilar atelectasis and cardiomegaly.  Responded well to diuresis with -1.3 overnight fluid balance, length of stay fluid balance +1.5 L.  Continue diuresis today with Lasix 40 mg IV x 1.  Epicardial wires have been removed.  Postoperative pain is well-controlled with tramadol.  Tolerating diet, passing flatus, has yet to move bowels, will give milk of mag and bisacodyl.  Continues to work with PT/OT,  consulted for home health care, downgrade to telemetry and anticipate discharge to home tomorrow if stable.       Objective     Physical Exam  Constitutional:       General: He is not in acute distress.  HENT:      Head: Normocephalic.      Nose: Nose normal.      Mouth/Throat:      Mouth: Mucous membranes are moist.   Eyes:      Pupils: Pupils are equal, round, and reactive to light.   Cardiovascular:      Rate and Rhythm: Normal rate and regular rhythm.      Pulses: Normal pulses.      Comments: Epicardial wires removed  Pericardial rub resolved  Pulmonary:      Comments: Improving inspiratory effort, spirometry volumes 1200 mL  Sternum stable, small hematoma at manubrium otherwise sternotomy is well-approximated without erythema  No drainage from chest tube insertion site sutures intact  Bilateral breath sounds clear to auscultation  Abdominal:      General: Bowel sounds are normal.      Comments: LBM preoperatively   Genitourinary:     Comments: Voiding clear yellow  Musculoskeletal:         General: Normal range of motion.      Cervical back: Normal range of motion.      Comments: Trace bilateral lower extremity edema   Skin:     General: Skin is warm  "and dry.      Comments: Right lower extremity EVH incision well-approximated without erythema   Neurological:      General: No focal deficit present.      Mental Status: He is alert and oriented to person, place, and time.   Psychiatric:         Mood and Affect: Mood normal.         Last Recorded Vitals  Blood pressure 141/81, pulse 94, temperature 37 °C (98.6 °F), temperature source Temporal, resp. rate 23, height 1.753 m (5' 9\"), weight 116 kg (254 lb 13.6 oz), SpO2 93%.  Intake/Output last 3 Shifts:  I/O last 3 completed shifts:  In: 2235 (19.3 mL/kg) [P.O.:2035; IV Piggyback:200]  Out: 3425 (29.6 mL/kg) [Urine:3425 (0.8 mL/kg/hr)]  Weight: 115.6 kg     Relevant Results  Scheduled medications  acetaminophen, 650 mg, oral, q6h  aspirin, 81 mg, oral, Daily  atorvastatin, 40 mg, oral, Nightly  colchicine, 0.6 mg, oral, Daily  docusate sodium, 100 mg, oral, TID  furosemide, 40 mg, intravenous, Once  heparin (porcine), 5,000 Units, subcutaneous, q8h  insulin lispro, 0-15 Units, subcutaneous, Before meals & nightly  levothyroxine, 50 mcg, oral, Daily  lidocaine, 1 patch, transdermal, Daily  methocarbamol, 500 mg, oral, q8h CLAUDIA  metoprolol tartrate, 25 mg, oral, BID  mupirocin, 0.5 Application, Topical, BID  oxygen, , inhalation, Continuous - Inhalation  pantoprazole, 40 mg, oral, Daily before breakfast   Or  pantoprazole, 40 mg, intravenous, Daily before breakfast  polyethylene glycol, 17 g, oral, BID  potassium chloride CR, 20 mEq, oral, Once  sacubitriL-valsartan, 1 tablet, oral, BID  simethicone, 80 mg, oral, 4x daily      Continuous medications     PRN medications  PRN medications: bisacodyl, calcium chloride, calcium chloride, dextrose **OR** glucagon, HYDROmorphone, magnesium hydroxide, magnesium sulfate, magnesium sulfate, metoclopramide **OR** metoclopramide, naloxone, ondansetron ODT **OR** ondansetron, oxyCODONE, oxyCODONE, potassium chloride CR, potassium chloride CR     Results for orders placed or " performed during the hospital encounter of 06/28/24 (from the past 24 hour(s))   POCT GLUCOSE   Result Value Ref Range    POCT Glucose 118 (H) 74 - 99 mg/dL   POCT GLUCOSE   Result Value Ref Range    POCT Glucose 132 (H) 74 - 99 mg/dL   POCT GLUCOSE   Result Value Ref Range    POCT Glucose 114 (H) 74 - 99 mg/dL   Calcium, Ionized   Result Value Ref Range    POCT Calcium, Ionized 1.15 1.1 - 1.33 mmol/L   Magnesium   Result Value Ref Range    Magnesium 2.00 1.60 - 2.40 mg/dL   CBC   Result Value Ref Range    WBC 15.8 (H) 4.4 - 11.3 x10*3/uL    nRBC 0.0 0.0 - 0.0 /100 WBCs    RBC 5.36 4.50 - 5.90 x10*6/uL    Hemoglobin 15.1 13.5 - 17.5 g/dL    Hematocrit 44.8 41.0 - 52.0 %    MCV 84 80 - 100 fL    MCH 28.2 26.0 - 34.0 pg    MCHC 33.7 32.0 - 36.0 g/dL    RDW 13.6 11.5 - 14.5 %    Platelets 196 150 - 450 x10*3/uL   Renal Function Panel   Result Value Ref Range    Glucose 134 (H) 74 - 99 mg/dL    Sodium 134 (L) 136 - 145 mmol/L    Potassium 3.7 3.5 - 5.3 mmol/L    Chloride 99 98 - 107 mmol/L    Bicarbonate 25 21 - 32 mmol/L    Anion Gap 14 10 - 20 mmol/L    Urea Nitrogen 23 6 - 23 mg/dL    Creatinine 0.98 0.50 - 1.30 mg/dL    eGFR >90 >60 mL/min/1.73m*2    Calcium 9.5 8.6 - 10.3 mg/dL    Phosphorus 4.8 2.5 - 4.9 mg/dL    Albumin 3.8 3.4 - 5.0 g/dL   POCT GLUCOSE   Result Value Ref Range    POCT Glucose 145 (H) 74 - 99 mg/dL   ECG 12 Lead   Result Value Ref Range    Ventricular Rate 92 BPM    Atrial Rate 92 BPM    ME Interval 146 ms    QRS Duration 106 ms    QT Interval 390 ms    QTC Calculation(Bazett) 482 ms    P Axis 46 degrees    R Axis -4 degrees    T Axis 122 degrees    QRS Count 16 beats    Q Onset 211 ms    P Onset 138 ms    P Offset 201 ms    T Offset 406 ms    QTC Fredericia 449 ms   POCT GLUCOSE   Result Value Ref Range    POCT Glucose 125 (H) 74 - 99 mg/dL      ECG 12 Lead    Result Date: 7/1/2024  Normal sinus rhythm Possible Left atrial enlargement Inferior infarct (cited on or before 30-JUN-2024) T wave  abnormality, consider lateral ischemia ** ** ACUTE MI / STEMI ** ** Consider right ventricular involvement in acute inferior infarct Abnormal ECG When compared with ECG of 30-JUN-2024 06:46, Serial changes of Inferior infarct Present Confirmed by Alexa Jimenez ([id number]) on 7/1/2024 9:40:53 AM    XR chest 1 view    Result Date: 7/1/2024  Interpreted By:  Jignesh Cortez, STUDY: XR CHEST 1 VIEW;  7/1/2024 5:07 am   INDICATION: Signs/Symptoms:s/p CABG.   COMPARISON: 06/30/2024.   ACCESSION NUMBER(S): [id number]   ORDERING CLINICIAN: KENN HERNANDEZ   FINDINGS: CARDIOMEDIASTINAL SILHOUETTE: Cardiomegaly, aortic prominence with calcification and postoperative changes of the mediastinum are similar to prior.   LUNGS: Left basilar atelectasis versus small infiltrate persists. Right infrahilar mild atelectasis also seen. No definite pleural effusion. No appreciable pneumothorax.   ABDOMEN: No remarkable upper abdominal findings.   BONES: Bones are stable.       1.  Left basilar atelectasis versus small infiltrate. 2. Right infrahilar mild atelectasis.       MACRO: None.   Signed by: Jignesh Cortez 7/1/2024 8:03 AM Dictation workstation:   [id number]    ECG 12 Lead    Result Date: 6/30/2024  Normal sinus rhythm Possible Left atrial enlargement Inferior infarct , age undetermined Abnormal ECG When compared with ECG of 29-JUN-2024 06:50, (unconfirmed) No significant change was found Confirmed by Alexa Jimenez ([id number]) on 6/30/2024 11:53:07 AM    XR chest 1 view    Result Date: 6/30/2024  Interpreted By:  Jignesh Cortez, STUDY: XR CHEST 1 VIEW;  6/30/2024 5:30 am   INDICATION: Signs/Symptoms:s/p CABG.   COMPARISON: 06/29/2024.   ACCESSION NUMBER(S): [id number]   ORDERING CLINICIAN: KENN HERNANDEZ   FINDINGS: CARDIOMEDIASTINAL SILHOUETTE: Right jugular central line remains in place with tubing tip at the SVC level. Cardiomegaly, aortic prominence with calcification and postoperative changes of the mediastinum are similar to prior.    LUNGS: Inspiratory volume is low. Streaky atelectasis and/or infiltrate is seen in the left perihilar region and base. No right lung consolidation. No definite pleural effusion. No appreciable pneumothorax.   ABDOMEN: No remarkable upper abdominal findings.   BONES: Bones are stable.       1.  Streaky atelectasis and or infiltrates of the left perihilar region and base.       MACRO: None.   Signed by: Jignesh Cortez 6/30/2024 10:00 AM Dictation workstation:   [id number]    XR chest 1 view    Result Date: 6/29/2024  Interpreted By:  Eleazar Coates, STUDY: XR CHEST 1 VIEW   INDICATION: Signs/Symptoms:Chest tube removal.   COMPARISON: June 29 earlier the same day   ACCESSION NUMBER(S): [id number]   ORDERING CLINICIAN: BEN JURADO   FINDINGS: Interval removal of the chest tubes. No consolidation, effusion, edema, or pneumothorax. Small left effusion and basilar atelectasis.         Interval chest tube removal without pneumothorax.   Signed by: Eleazar Coates 6/29/2024 4:37 PM Dictation workstation:   [id number]    XR chest 1 view    Result Date: 6/29/2024  Interpreted By:  Jignesh Cortez, STUDY: XR CHEST 1 VIEW;  6/29/2024 10:53 am   INDICATION: Signs/Symptoms:s/p CABG.   COMPARISON: 06/28/2024.   ACCESSION NUMBER(S): [id number]   ORDERING CLINICIAN: KENN HERNANDEZ   FINDINGS: CARDIOMEDIASTINAL SILHOUETTE: Endotracheal tube and NG tube are no longer seen. A Colbert-Mohamud catheter from right jugular approach remains in place and is visualized to the right atrium level. The distal segment of the Colbert-Mohamud catheter including the tip is not well visualized due to obscuration by overlying median sternotomy hardware. A cephalad directed probable mediastinal drain remains in place. Cardiomegaly, aortic prominence with calcification and postoperative changes of the mediastinum are again seen.   LUNGS: Inspiratory volume is low. Left basilar chest tube remains in place. Left basilar atelectasis versus small infiltrate is  present. No appreciable pneumothorax.   ABDOMEN: No remarkable upper abdominal findings.   BONES: Bones are stable.       1.  Postoperative changes of the mediastinum with residual life-support lines and tubes as detailed. Distal segment of the [place]-Mohamud catheter including the tip are not well visualized due to obscuration from the median sternotomy hardware. 2. Low inspiratory volume with left basilar atelectasis versus infiltrate.       MACRO: None.   Signed by: Jignesh Cortez 6/29/2024 10:59 AM Dictation workstation:   [id number]                         Assessment/Plan   Principal Problem:    Coronary artery disease of native artery of native heart with stable angina pectoris (CMS-HCC)    CAD; ICM  During work up for CVA, echo revealed moderately reduced LV function with EF 35-40%. Pt subsequently underwent LHC revealing multivessel CAD and was referred for surgical revascularization.  June 28th, 2024 pt presented to [facility] and underwent CABG x3 with LIMA-Diag-LAD, SVG-OM2; endoscopic harvest of R great saphenous vein; median sternotomy.   -Downgrade to telemetry  -Epicardial wires removed  -ASA and Statin daily  -Metoprolol 25mg BID; optimize as hemodynamics allow  -Entresto 24-26mg BID   -Lasix 40mg IVPx1; further adjustments based on response.  -Keep K>4 and Mag>2;  PRN replacement as ordered   -Multimodal pain regimen as ordered  -Bowel regimen of Miralax and Colace. Simethicone QID   -Cardiac diet. Protonix for GI ppx   -sub-q Heparin for DVT ppx   -Encourage IS. Supplemental oxygen as need for SPO2>90%  -Increase activity as tolerated; ambulate TID; OOB for all meals  -Therapy following; [facility] scores 17 (PT and OT)   -SW for DC planning  -CBC, BMP, Mag, CXR, and EKG in AM  -Possible discharge in a.m. if stable       Pericarditis  Post operative pericarditis as evidenced by pericardial rub and mild leukocytosis.  POD#2: WBC 16.2 (remains afebrile without overt signs of infection on exam). Pericardial rub  has nearly resolved   POD #3: WBCs trending down to 15.8, pericardial rub resolved  -Colchicine BID  -follow EKG     Hx of CVA  Presented to ED in June c/o left sided weakness. MRI confirmed acute/subacute infarcts R frontal and parietal lobes.  -ASA and Statin daily  -therapy following      HTN; HLD  -Metoprolol 25mg BID; optimize as hemodynamics allow  -Statin daily   -appreciate assistance of Cardiology colleagues      Hypothyroidism  -Continue home Synthroid       I spent 35 minutes in the professional and overall care of this patient.      JES Glaser-CNP

## 2024-07-01 NOTE — PROGRESS NOTES
"Social Work Note Rcvd referral for SW consult due to pt being self pay.  Per HRS report, pt is on their list for screening.  Met with pt who states that he met with HRS today and \"filled out their paperwork.\"  Pt confirms self pay status.  Provided pt with information on [facility] including application for sliding fee scale and and phone number.  Also discussed/explained Good Rx.  Pt states no further needs.   MARCELLO Gambino  "

## 2024-07-01 NOTE — CARE PLAN
Problem: Pain - Adult  Goal: Verbalizes/displays adequate comfort level or baseline comfort level  Outcome: Progressing     Problem: Safety - Adult  Goal: Free from fall injury  Outcome: Progressing     Problem: Discharge Planning  Goal: Discharge to home or other facility with appropriate resources  Outcome: Progressing     Problem: Chronic Conditions and Co-morbidities  Goal: Patient's chronic conditions and co-morbidity symptoms are monitored and maintained or improved  Outcome: Progressing     Problem: Skin  Goal: Decreased wound size/increased tissue granulation at next dressing change  Outcome: Progressing  Goal: Participates in plan/prevention/treatment measures  Outcome: Progressing  Goal: Prevent/manage excess moisture  Outcome: Progressing  Goal: Prevent/minimize sheer/friction injuries  Outcome: Progressing  Goal: Promote/optimize nutrition  Outcome: Progressing  Goal: Promote skin healing  Outcome: Progressing     Problem: Pain  Goal: Takes deep breaths with improved pain control throughout the shift  Outcome: Progressing  Goal: Turns in bed with improved pain control throughout the shift  Outcome: Progressing  Goal: Walks with improved pain control throughout the shift  Outcome: Progressing  Goal: Performs ADL's with improved pain control throughout shift  Outcome: Progressing  Goal: Participates in PT with improved pain control throughout the shift  Outcome: Progressing  Goal: Free from opioid side effects throughout the shift  Outcome: Progressing  Goal: Free from acute confusion related to pain meds throughout the shift  Outcome: Progressing     Problem: Resident is recovering from cardiovascular surgery  Goal: I will maintain and build strength.  Outcome: Progressing  Goal: I will decrease complications and risks after surgery  Outcome: Progressing     Problem: Indwelling Catheter Maintenance  Goal: I will have no complications from indwelling catheter  Outcome: Progressing     Problem:  Respiratory  Goal: Clear secretions with interventions this shift  Outcome: Progressing  Goal: Minimize anxiety/maximize coping throughout shift  Outcome: Progressing  Goal: Minimal/no exertional discomfort or dyspnea this shift  Outcome: Progressing  Goal: No signs of respiratory distress (eg. Use of accessory muscles. Peds grunting)  Outcome: Progressing  Goal: Patent airway maintained this shift  Outcome: Progressing  Goal: Increase self care and/or family involvement in next 24 hours  Outcome: Progressing   The patient's goals for the shift include      The clinical goals for the shift include Patient will remain hemodynamically stable throughout shift    Over the shift, the patient did  make progress toward the following goals. Continue pain management and increase activity, Continue incentive spirometry

## 2024-07-01 NOTE — PROGRESS NOTES
Occupational Therapy    OT Treatment    Patient Name: Kamar Garcia  MRN: [id number]  Today's Date: 7/1/2024  Time Calculation  Start Time: 0853  Stop Time: 0920  Time Calculation (min): 27 min        Assessment:  OT Assessment: Patient limited by impaired balance, decreased endurance, decreased strength, and decreased independence with adl's.  End of Session Communication: Bedside nurse  End of Session Patient Position: Up in chair, Alarm off, not on at start of session (All needs met, call light within reach.)     Plan:  Treatment Interventions: ADL retraining, Functional transfer training, Endurance training, Equipment evaluation/education, Compensatory technique education  OT Frequency: 3 times per week  OT Discharge Recommendations: Low intensity level of continued care  OT Recommended Transfer Status: Minimal assist, Assist of 2  Treatment Interventions: ADL retraining, Functional transfer training, Endurance training, Equipment evaluation/education, Compensatory technique education    Subjective   Previous Visit Info:  OT Last Visit  OT Received On: 07/01/24  General:  General  Reason for Referral: s/p CABG  Referred By: Lewis  Past Medical History Relevant to Rehab: LINA, HLD, HTN, CAD, CVA with residual L arm weakness and slurred speech, hypothyroidism  Prior to Session Communication: Bedside nurse (cleared for participation in OT by RN)  Patient Position Received: Up in chair, Alarm off, caregiver present  General Comment: Pt pleasant and cooperative, agreeable to OT  Precautions:  Medical Precautions: Fall precautions  Post-Surgical Precautions: Move in the Tube  Precautions Comment: tele, monitor lines  Vital Signs:  Vital Signs  Heart Rate: 96  SpO2:  (92-94% with activity on RA)  Pain:  Pain Assessment  Pain Assessment: 0-10  0-10 (Numeric) Pain Score: 0 - No pain    Objective    Cognition:  Cognition  Overall Cognitive Status: Within Functional Limits (Flat affect)  Orientation Level: Oriented  X4  Following Commands: Follows one step commands with increased time  Insight: Mild  Impulsive: Mildly       Activities of Daily Living: Grooming  Grooming Comments: Performed hand hygiene in stance at sink with fair balance.  Education and cues provided for aherence to MITT precautions with completion of standing G/H tasks with item retrieval.    LE Bathing  LE Bathing Comments:  (Pt performed lenora-hygiene in stance.  Education and cues for technique and adherence to MITT precautions.)    UE Dressing  UE Dressing Comments: Educated on UB dressing techniques for donning overhead shirts.  Min assist and mod verbal/visual cues for compensatory techniques.    LE Dressing  LE Dressing: Yes  Pants Level of Assistance: Minimum assistance  LE Dressing Where Assessed: Chair  LE Dressing Comments: Min assist to thread LE's; assist to steady once in stance. Pt educated on compensatory dressing techniques while adhering to cardiac precautions.  Instructed pt on Move in the Tube (MITT) precautions and proper UE placement while pulling pants over hips.  Pt also provided verbal education on safe UE placement while performing posterior hygiene.  Pt verbalized understanding and provided return demonstration with fair carryover post education.       Bed Mobility/Transfers: Transfers  Transfer: Yes  Transfer 1  Technique 1: Sit to stand, Stand to sit  Transfer Device 1:  (fww)  Transfer Level of Assistance 1: Contact guard  Trials/Comments 1: x 3 sit to stand transfers from chair/bsc surface.  Verbal cues for safety and techniques.  Pt educated on sit<>stand transfer techniques with use of heart pillow and placing hands on knees for support.  Pt performed both techniques with CGA.  Transfers 2  Transfer From 2: Chair with arms to  Transfer to 2: Commode-standard  Transfer Device 2:  (fww)  Transfer Level of Assistance 2: Contact guard      Functional Mobility:  Functional Mobility  Functional Mobility Performed:  (Functional  mobility household distances with fww with CGA.  Education and cues for safety and technique.)  Sitting Balance:  Static Sitting Balance  Static Sitting-Comment/Number of Minutes: Good  Dynamic Sitting Balance  Dynamic Sitting-Comments: Good  Standing Balance:  Static Standing Balance  Static Standing-Comment/Number of Minutes: Fair+  Dynamic Standing Balance  Dynamic Standing-Comments: Fair       Therapy/Activity: Therapeutic Activity  Therapeutic Activity Performed:  (Sustained activity tolerance x 5-6 min x 3 trials.)    Balance/Neuromuscular Re-Education  Balance/Neuromuscular Re-Education Activity Performed:  (Stand tolerance x 4-6 min with 0-2 UE support.)    Outcome Measures:[facility] Daily Activity  Putting on and taking off regular lower body clothing: A lot  Bathing (including washing, rinsing, drying): A little  Putting on and taking off regular upper body clothing: A little  Toileting, which includes using toilet, bedpan or urinal: A little  Taking care of personal grooming such as brushing teeth: A little  Eating Meals: A little  Daily Activity - Total Score: 17        Education Documentation  Body Mechanics, taught by Jennifer L Felty, OTA at 7/1/2024  1:32 PM.  Learner: Patient  Readiness: Acceptance  Method: Explanation, Demonstration  Response: Verbalizes Understanding, Needs Reinforcement    Precautions, taught by Jennifer L Felty, OTA at 7/1/2024  1:32 PM.  Learner: Patient  Readiness: Acceptance  Method: Explanation, Demonstration  Response: Verbalizes Understanding, Needs Reinforcement    ADL Training, taught by Jennifer L Felty, OTA at 7/1/2024  1:32 PM.  Learner: Patient  Readiness: Acceptance  Method: Explanation, Demonstration  Response: Verbalizes Understanding, Needs Reinforcement    Education Comments  No comments found.        OP EDUCATION:       Goals:  Encounter Problems       Encounter Problems (Active)       OT Goals       Pt will complete LB dressing with Peyman for use of AE.    (Progressing)       Start:  06/29/24    Expected End:  07/13/24            Patient will transfer to bed/chair/toilet with SBA.  (Progressing)       Start:  06/29/24    Expected End:  07/13/24            Pt will tolerate 15 minutes of activity with 1 rest break to improve endurance with I/ADL tasks.  (Progressing)       Start:  06/29/24    Expected End:  07/13/24            Pt will ambulate functional household distance with Peyman of use of FWW to complete I/ADL task.   (Progressing)       Start:  06/29/24    Expected End:  07/13/24

## 2024-07-01 NOTE — PROGRESS NOTES
Physical Therapy    Physical Therapy Treatment    Patient Name: Kamar Garcia  MRN: [id number]  Today's Date: 7/1/2024  Time Calculation  Start Time: 1118  Stop Time: 1133  Time Calculation (min): 15 min     11/11-A    Assessment/Plan   PT Assessment  PT Assessment Results: Decreased strength, Decreased endurance, Impaired balance, Decreased mobility, Decreased safety awareness, Pain  Rehab Prognosis: Good  Treatment Tolerance: Patient tolerated treatment well  Medical Staff Made Aware: Yes  End of Session Communication: Bedside nurse, PCT/NA/CTA  Assessment Comment: Patient showing progress with transfers/amb. Patient will benefit from additional PT to address deficits and improve mobility.  End of Session Patient Position: Up in chair (Call light, phone, and tray table within reach.)  PT Plan  Inpatient/Swing Bed or Outpatient: Inpatient  Treatment/Interventions: Bed mobility, Transfer training, Gait training, Balance training, Neuromuscular re-education, Strengthening, Endurance training, Range of motion, Therapeutic exercise, Therapeutic activity, Home exercise program  PT Plan: Ongoing PT  PT Frequency: 4 times per week  PT Discharge Recommendations: Low intensity level of continued care    PT Recommended Transfer Status: Assist x1, Assistive device    General Visit Information:   PT  Visit  PT Received On: 07/01/24  General  Family/Caregiver Present: No  Prior to Session Communication: Bedside nurse  Patient Position Received: Up in chair  General Comment: Pleasant and cooperative.    General Observations:   General Observation: Tele; Multiple monitor lines.    Subjective     Precautions:  Precautions  Medical Precautions: Fall precautions  Post-Surgical Precautions: Move in the Tube    Vital Signs:  Vital Signs  Heart Rate:  (82-88bpm)  SpO2:  (Patient on RA. SPO2 94-96% with activities. No c/o SOB.)  BP:  (/67mmHg before session. /79mmHg after amb.)    Objective     Pain:  Pain Assessment  Pain  Assessment: 0-10  0-10 (Numeric) Pain Score: 0 - No pain    Cognition:  Cognition  Overall Cognitive Status: Within Functional Limits    Balance:   Static Sitting Balance  Static Sitting-Comment/Number of Minutes: G  Dynamic Sitting Balance  Dynamic Sitting-Comments: G  Static Standing Balance  Static Standing-Comment/Number of Minutes: F+ with ww  Dynamic Standing Balance  Dynamic Standing-Comments: F with ww    Treatments:   Bed Mobility  Bed Mobility:  (No bed mobility performed 2° patient states he does not sleep in a bed here or at home; he sleeps in a recliner.)  Ambulation/Gait Training  Ambulation/Gait Training Performed: Yes  Ambulation/Gait Training 1  Surface 1: Level tile  Device 1: Rolling walker  Assistance 1:  (SBA)  Comments/Distance (ft) 1: ~75ft x2. NBOS. Slow loreto. Step through gait pattern. Decreased step height/length B. Safe maneuvering of ww with 90/180° turns. No LOB.  Transfers  Transfer: Yes  Transfer 1  Technique 1: Sit to stand, Stand to sit  Transfer Device 1:  (ww)  Transfer Level of Assistance 1:  (SBA/CGA)  Trials/Comments 1: (3x). v/c for safe hand placement and technique while keeping aware of MITT precautions. Slow transition of hands to/from ww. Benefits from elevated surfaces.          Outcome Measures:  [facility] Basic Mobility  Turning from your back to your side while in a flat bed without using bedrails: A little  Moving from lying on your back to sitting on the side of a flat bed without using bedrails: A lot  Moving to and from bed to chair (including a wheelchair): A little  Standing up from a chair using your arms (e.g. wheelchair or bedside chair): A little  To walk in hospital room: A little  Climbing 3-5 steps with railing: A little  Basic Mobility - Total Score: 17    Education Documentation  Precautions, taught by Angela Vides PTA at 7/1/2024  1:15 PM.  Learner: Patient  Readiness: Acceptance  Method: Explanation, Demonstration  Response: Verbalizes Understanding,  Needs Reinforcement  Comment: See therapy note.    Mobility Training, taught by Angela Vides PTA at 7/1/2024  1:15 PM.  Learner: Patient  Readiness: Acceptance  Method: Explanation, Demonstration  Response: Verbalizes Understanding, Needs Reinforcement  Comment: See therapy note.    EDUCATION:  Individual(s) Educated: Patient  Education Provided: Body Mechanics, Fall Risk, Home Safety, POC, Post-Op Precautions (Balance; Safety with transfers/amb.)  Equipment:  (Patient wants a ww issued before discharge home.)  Patient Response to Education: Patient/Caregiver Verbalized Understanding of Information, Patient/Caregiver Performed Return Demonstration of Exercises/Activities, Patient/Caregiver Asked Appropriate Questions    Encounter Problems       Encounter Problems (Active)       PT Problem       Pt will demonstrate sup > sit and sit > sup bed mobility mod I (Not Progressing)       Start:  06/29/24    Expected End:  07/13/24            Pt will demo sit > stand and stand > sit transfer with LRD and mod I vs independent  (Progressing)       Start:  06/29/24    Expected End:  07/13/24            Pt will ambulate 50' with LRD and mod I vs independent, without LOB  (Progressing)       Start:  06/29/24    Expected End:  07/13/24            Pt will verbalize and demonstrate understanding of MITT precautions throughout therapy treatment (Progressing)       Start:  06/29/24    Expected End:  07/13/24

## 2024-07-01 NOTE — OP NOTE
Creation Bypass Graft Coronary ArteryX3(LIMA-DIAG-LAD, SVG-OM2), EVH, RENÉE Operative Note     Date: 2024  OR Location: [facility] OR    Name: Kamar Garcia YOB: 1970, Age: 53 y.o., MRN: [id number], Sex: male    Diagnosis  Pre-op Diagnosis     * Coronary artery disease of native artery of native heart with stable angina pectoris (CMS-HCC) [I25.118] Post-op Diagnosis     * Coronary artery disease of native artery of native heart with stable angina pectoris (CMS-HCC) [I25.118]     Procedures  Creation Bypass Graft Coronary ArteryX3(LIMA-DIAG-LAD, SVG-OM2), EVH, RENÉE  [id number] - [place] CORONARY ARTERY BYP W/VEIN & ARTERY GRAFT 4 VEIN    Median Sternotomy  Central Cannulation  Right Leg EVH  CABGx3: LIMA-Dg-LAD, SVG-OM2  Sternal closure: wires + platingx3      Surgeons      * Hubert Lovell - Primary    Resident/Fellow/Other Assistant:  Surgeons and Role:     * Cady Mccrary MD - Assisting  Dr Stoney Mccrary was present and assisting during the whole case as there is were no available qualified residents  He also performed the sternotomy, LIMA harvesting, cannulation and homostasis    Procedure Summary  Anesthesia: General  ASA: IV  Anesthesia Staff: Anesthesiologist: Bo Rangel DO; Anh Dhillon MD; Eleazar Felipe MD  CRNA: JES Salazar-CRNA  Perfusionist: Sammi Yoo  Estimated Blood Loss: 250mL  Intra-op Medications:   Administrations occurring from 0730 to 1345 on 24:   Medication Name Total Dose   papaverine injection 60 mg   sodium chloride 0.9 % irrigation solution 5,000 mL   vancomycin (Vancocin) vial for injection 4 g   heparin 10,000 Units in sodium chloride 0.9 % 1,000 mL irrigation 2,000 mL   ceFAZolin in dextrose (iso-os) (Ancef) IVPB 2 g 4 g   heparin 1,000 unit/mL injection 45,000 Units 45,000 Units   lactated Ringer's bolus 500 mL Cannot be calculated   vancomycin (Vancocin) in % 5 dextrose 500 mL IV 1,750 mg 1,680 mg   aminocaproic acid (Amicar) infusion 11.04 g   insulin  regular 100 unit/100 mL (1 unit/mL) in 0.9 % NaCl infusion Cannot be calculated   lactated Ringer's infusion 289.5 mL   lactated Ringer's infusion Cannot be calculated   mupirocin (Bactroban) 2 % ointment Cannot be calculated   niCARdipine (Cardene) 40 mg in sodium chloride 200 mL (0.2 mg/mL) infusion (premix) Cannot be calculated   norepinephrine (Levophed) 8 mg in dextrose 5% 250 mL (0.032 mg/mL) infusion (premix) Cannot be calculated         Intraprocedure I/O Totals       None           Specimen: No specimens collected     Staff:   Circulator: Lois  Scrub Person: Nora         Drains and/or Catheters:   [REMOVED] Chest Tube 1 Mediastinal 32 Fr (Removed)   Function -20 cm H2O 06/29/24 1200   Chest Tube Air Leak No 06/29/24 1200   Patency Intervention Tip/tilt 06/29/24 0800   Drainage Description Serosanguineous 06/29/24 1200   Dressing Status Clean;Dry;Occlusive 06/29/24 1200   Site Assessment Clean;Dry;Intact 06/29/24 1200   Surrounding Skin Dry;Intact 06/29/24 1200   Output (mL) 20 mL 06/29/24 1427       [REMOVED] Chest Tube Left Pleural 28 Fr (Removed)   Function -20 cm H2O 06/29/24 1200   Chest Tube Air Leak No 06/29/24 1200   Patency Intervention Tip/tilt 06/29/24 0800   Drainage Description Serosanguineous 06/29/24 1200   Dressing Status Clean;Dry;Occlusive 06/29/24 1200   Site Assessment Clean;Dry;Intact 06/29/24 1200   Surrounding Skin Dry;Intact 06/29/24 1200   Output (mL) 10 mL 06/29/24 1427       [REMOVED] Urethral Catheter Non-latex;Temperature probe 16 Fr. (Removed)   Site Assessment Clean;Skin intact 06/29/24 1200   Collection Container Urometer 06/29/24 1200   Securement Method Securing device (Describe) 06/29/24 1200   Reason for Continuing Urinary Catheterization accurate hourly measurement of urine volume in a critically ill patient that cannot be assessed by other volumes and urine collection strategies 06/29/24 0800   Output (mL) 75 mL 06/29/24 1500       Tourniquet Times:          Implants:  Implants       Type Name Action Serial No.       86MM GIDEON SOFTJAW CLAMP INSERT SET Implanted                 Indications: Kamar Garcia is an 53 y.o. male who is having surgery for multivessel coronary artery disease    The patient was seen in the preoperative area. The risks, benefits, complications, treatment options, non-operative alternatives, expected recovery and outcomes were discussed with the patient. The possibilities of reaction to medication, pulmonary aspiration, injury to surrounding structures, bleeding, recurrent infection, the need for additional procedures, failure to diagnose a condition, and creating a complication requiring transfusion or operation were discussed with the patient. The patient concurred with the proposed plan, giving informed consent.  The site of surgery was properly noted/marked if necessary per policy. The patient has been actively warmed in preoperative area. Preoperative antibiotics have been ordered and given within 1 hours of incision. Venous thrombosis prophylaxis are not indicated.    Procedure Details:     OPERATIVE FINDINGS:  There were no pericardial adhesions or effusions.  The ascending aorta was soft without evidence of atherosclerosis.  The left ventricular function was 40-45%.  The patient had severe diffuse coronary artery disease, however we were able to find locations on the LAD,D1 and OM2 that were suitable for grafting.    OPERATIVE PROCEDURE:  The patient was brought to the operating room, placed on the operating table in supine position.  General endotracheal anesthesia and hemodynamic monitoring were established.  The patient was prepped and draped in standard fashion. A median sternotomy was performed.  The LIMA was harvested in a skeletonized manner. The Saphenous vein was harvested endoscopically from the right lower limb.  The thymus was divided and the pericardium was opened.  The ascending aorta was palpated and it was without  evidence of atherosclerosis.  The patient was heparinized.  The ascending aorta and right atrium were cannulated for cardiopulmonary bypass and antegrade cardioplegia cannula was placed.  The patient was placed on cardiopulmonary bypass, the ascending aorta was crossclamped, and the heart was arrested and protected with cold blood cardioplegia.  During the remainder of the cross-clamp time, cold blood cardioplegia was administered every 15 to 20 minutes.      GRAFTS: CABGx3  LIMA-D1-LAD  SVG-OM2    Graft 1, OM2:  This vessel was opened proximally, which was 1.5 mm in diameter, free from disease at the point of entry.  A length of reverse saphenous vein was anastomosed end-to-side using continuous 7-0 Prolene.     Graft 2, D1:    This vessel was opened in its mid course which was 1.5 mm in diameter, free from disease at the point of entry. The LIMA was anastomosed side-to-side using continuous 8-0 Prolene.     Graft 3, LAD:    This vessel was opened in its mid to distal third, which was 2.0 mm in diameter, free from disease at the point of entry. The LIMA was anastomosed end-to-side using continuous 8-0 Prolene.     The proximal end of the vein graft was anastomosed to punch hole into the ascending aorta using continuous 6-0 Prolene sutures.     Assessment of graft flows demonstrated good flows in all the grafts.    Cardiopulmonary bypass was weaned uneventfully.  Two right atrial pacing wires were applied. Protamine was administered to reverse systemic anticoagulation. Hemostasis was secured.     Two drains were inserted, 1 in the left pleural space and 1 in the mediastinum.  The sternum was closed with interrupted stainless steel wires and subcutaneous layers were closed using Vicryl and skin was closed using subcuticular Vicryl.  The patient remained stable, was transferred to the Cardiothoracic Intensive Care Unit in stable cardiorespiratory condition.  I was available for all aspects  of procedure preoperatively  and postoperatively    Complications:  None; patient tolerated the procedure well.    Disposition: ICU - intubated and hemodynamically stable.  Condition: stable     Additional Details:     Cardio Pulmonary Bypass Time:  118 min  Cross-clamp Time: 95 min    Attending Attestation: I was present and scrubbed for the entire procedure.    Hubert Lovell  Phone Number: [phone]

## 2024-07-01 NOTE — PROGRESS NOTES
07/01/24 1143   Discharge Planning   Living Arrangements Alone   Support Systems Children;Family members  (Daughter, sister and niece available to assist)   Assistance Needed none, PTA ind ADLS and IADLS no AD, drives, denies falls   Type of Residence Private residence  (1 level home + basement (laundry))   Number of Stairs to Enter Residence 1   Number of Stairs Within Residence 12   Do you have animals or pets at home? No   Home or Post Acute Services In home services   Type of Home Care Services Home nursing visits   Patient expects to be discharged to: Home with [facility] 2 Early DC nursing visits   Does the patient need discharge transport arranged? No   Financial Resource Strain   How hard is it for you to pay for the very basics like food, housing, medical care, and heating? Not hard   Housing Stability   In the last 12 months, was there a time when you were not able to pay the mortgage or rent on time? N   In the last 12 months, how many places have you lived? 1   In the last 12 months, was there a time when you did not have a steady place to sleep or slept in a shelter (including now)? N   Transportation Needs   In the past 12 months, has lack of transportation kept you from medical appointments or from getting medications? no   In the past 12 months, has lack of transportation kept you from meetings, work, or from getting things needed for daily living? No   Patient Choice   Provider Choice list and CMS website (https://medicare.gov/care-compare#search) for post-acute Quality and Resource Measure Data were provided and reviewed with: Patient   Patient / Family choosing to utilize agency / facility established prior to hospitalization No     Per rounding report with Interdisciplinary team, pt is s/p POD #3 CABG x 3, transfer to [facility] today. Pt states PTA ind ADLS and IADLS no DME, pt drives, denies falls. Pt lives alone in 1 level home with basement (laundry), pts daughter, niece and sister to stay and assist  as needed x 2 weeks when returns home. Pt has tub shower with seat, but does not own any DME. Pt confirms that is he does not have insurance. CNP states pt needs home care. [facility] scores are PT (17) OT (17) and recommend continued therapy at low level/intensity. CT  has approved 2 RN early DC visits, CNP is ok with 2 Nursing visits and entered HCO in Epic. Pt Demographics verified. [facility]  notified of this information and approval email forwarded to same . ADOD 7/2. CT team will continue monitoring case for progression and DC planning.

## 2024-07-02 ENCOUNTER — APPOINTMENT (OUTPATIENT)
Dept: CARDIOLOGY | Facility: HOSPITAL | Age: 54
End: 2024-07-02
Payer: MEDICAID

## 2024-07-02 ENCOUNTER — HOSPITAL ENCOUNTER (INPATIENT)
Dept: CARDIOLOGY | Facility: HOSPITAL | Age: 54
Discharge: HOME | End: 2024-07-02
Payer: MEDICAID

## 2024-07-02 ENCOUNTER — HOME HEALTH ADMISSION (OUTPATIENT)
Dept: HOME HEALTH SERVICES | Facility: HOME HEALTH | Age: 54
End: 2024-07-02
Payer: MEDICAID

## 2024-07-02 LAB
ANION GAP SERPL CALC-SCNC: 13 MMOL/L (ref 10–20)
ATRIAL RATE: 107 BPM
ATRIAL RATE: 308 BPM
BLOOD EXPIRATION DATE: NORMAL
BLOOD EXPIRATION DATE: NORMAL
BUN SERPL-MCNC: 24 MG/DL (ref 6–23)
CALCIUM SERPL-MCNC: 9.1 MG/DL (ref 8.6–10.3)
CHLORIDE SERPL-SCNC: 98 MMOL/L (ref 98–107)
CO2 SERPL-SCNC: 28 MMOL/L (ref 21–32)
CREAT SERPL-MCNC: 1.04 MG/DL (ref 0.5–1.3)
DISPENSE STATUS: NORMAL
DISPENSE STATUS: NORMAL
EGFRCR SERPLBLD CKD-EPI 2021: 86 ML/MIN/1.73M*2
ERYTHROCYTE [DISTWIDTH] IN BLOOD BY AUTOMATED COUNT: 13.5 % (ref 11.5–14.5)
GLUCOSE BLD MANUAL STRIP-MCNC: 116 MG/DL (ref 74–99)
GLUCOSE BLD MANUAL STRIP-MCNC: 122 MG/DL (ref 74–99)
GLUCOSE BLD MANUAL STRIP-MCNC: 130 MG/DL (ref 74–99)
GLUCOSE BLD MANUAL STRIP-MCNC: 162 MG/DL (ref 74–99)
GLUCOSE SERPL-MCNC: 115 MG/DL (ref 74–99)
HCT VFR BLD AUTO: 42.4 % (ref 41–52)
HGB BLD-MCNC: 14.3 G/DL (ref 13.5–17.5)
MAGNESIUM SERPL-MCNC: 2.05 MG/DL (ref 1.6–2.4)
MCH RBC QN AUTO: 28.1 PG (ref 26–34)
MCHC RBC AUTO-ENTMCNC: 33.7 G/DL (ref 32–36)
MCV RBC AUTO: 84 FL (ref 80–100)
NRBC BLD-RTO: 0 /100 WBCS (ref 0–0)
P AXIS: -62 DEGREES
P AXIS: 41 DEGREES
P OFFSET: 199 MS
P OFFSET: 203 MS
P ONSET: 140 MS
P ONSET: 155 MS
PLATELET # BLD AUTO: 242 X10*3/UL (ref 150–450)
POTASSIUM SERPL-SCNC: 4 MMOL/L (ref 3.5–5.3)
PR INTERVAL: 142 MS
PRODUCT BLOOD TYPE: 5100
PRODUCT BLOOD TYPE: 5100
PRODUCT CODE: NORMAL
PRODUCT CODE: NORMAL
Q ONSET: 211 MS
Q ONSET: 211 MS
QRS COUNT: 18 BEATS
QRS COUNT: 25 BEATS
QRS DURATION: 106 MS
QRS DURATION: 90 MS
QT INTERVAL: 262 MS
QT INTERVAL: 364 MS
QTC CALCULATION(BAZETT): 419 MS
QTC CALCULATION(BAZETT): 485 MS
QTC FREDERICIA: 358 MS
QTC FREDERICIA: 441 MS
R AXIS: -25 DEGREES
R AXIS: -27 DEGREES
RBC # BLD AUTO: 5.08 X10*6/UL (ref 4.5–5.9)
SODIUM SERPL-SCNC: 135 MMOL/L (ref 136–145)
T AXIS: 117 DEGREES
T AXIS: 147 DEGREES
T OFFSET: 342 MS
T OFFSET: 393 MS
UNIT ABO: NORMAL
UNIT ABO: NORMAL
UNIT NUMBER: NORMAL
UNIT NUMBER: NORMAL
UNIT RH: NORMAL
UNIT RH: NORMAL
UNIT VOLUME: 350
UNIT VOLUME: 350
VENTRICULAR RATE: 107 BPM
VENTRICULAR RATE: 154 BPM
WBC # BLD AUTO: 13.8 X10*3/UL (ref 4.4–11.3)
XM INTEP: NORMAL
XM INTEP: NORMAL

## 2024-07-02 PROCEDURE — 2500000005 HC RX 250 GENERAL PHARMACY W/O HCPCS: Performed by: NURSE PRACTITIONER

## 2024-07-02 PROCEDURE — 82947 ASSAY GLUCOSE BLOOD QUANT: CPT

## 2024-07-02 PROCEDURE — 2500000001 HC RX 250 WO HCPCS SELF ADMINISTERED DRUGS (ALT 637 FOR MEDICARE OP): Performed by: NURSE PRACTITIONER

## 2024-07-02 PROCEDURE — 93005 ELECTROCARDIOGRAM TRACING: CPT

## 2024-07-02 PROCEDURE — 99223 1ST HOSP IP/OBS HIGH 75: CPT | Performed by: NURSE PRACTITIONER

## 2024-07-02 PROCEDURE — 2500000004 HC RX 250 GENERAL PHARMACY W/ HCPCS (ALT 636 FOR OP/ED)

## 2024-07-02 PROCEDURE — 93010 ELECTROCARDIOGRAM REPORT: CPT | Performed by: INTERNAL MEDICINE

## 2024-07-02 PROCEDURE — 2500000004 HC RX 250 GENERAL PHARMACY W/ HCPCS (ALT 636 FOR OP/ED): Performed by: NURSE PRACTITIONER

## 2024-07-02 PROCEDURE — 99291 CRITICAL CARE FIRST HOUR: CPT

## 2024-07-02 PROCEDURE — 36415 COLL VENOUS BLD VENIPUNCTURE: CPT | Performed by: NURSE PRACTITIONER

## 2024-07-02 PROCEDURE — 2500000002 HC RX 250 W HCPCS SELF ADMINISTERED DRUGS (ALT 637 FOR MEDICARE OP, ALT 636 FOR OP/ED): Performed by: NURSE PRACTITIONER

## 2024-07-02 PROCEDURE — RXMED WILLOW AMBULATORY MEDICATION CHARGE

## 2024-07-02 PROCEDURE — 85027 COMPLETE CBC AUTOMATED: CPT | Performed by: NURSE PRACTITIONER

## 2024-07-02 PROCEDURE — 99223 1ST HOSP IP/OBS HIGH 75: CPT | Performed by: INTERNAL MEDICINE

## 2024-07-02 PROCEDURE — 82565 ASSAY OF CREATININE: CPT | Performed by: NURSE PRACTITIONER

## 2024-07-02 PROCEDURE — 2500000002 HC RX 250 W HCPCS SELF ADMINISTERED DRUGS (ALT 637 FOR MEDICARE OP, ALT 636 FOR OP/ED)

## 2024-07-02 PROCEDURE — 94640 AIRWAY INHALATION TREATMENT: CPT

## 2024-07-02 PROCEDURE — 83735 ASSAY OF MAGNESIUM: CPT | Performed by: NURSE PRACTITIONER

## 2024-07-02 PROCEDURE — 2020000001 HC ICU ROOM DAILY

## 2024-07-02 PROCEDURE — 99239 HOSP IP/OBS DSCHRG MGMT >30: CPT | Performed by: NURSE PRACTITIONER

## 2024-07-02 RX ORDER — ACETAMINOPHEN 325 MG/1
650 TABLET ORAL EVERY 6 HOURS PRN
Status: DISCONTINUED | OUTPATIENT
Start: 2024-07-02 | End: 2024-07-04 | Stop reason: HOSPADM

## 2024-07-02 RX ORDER — POTASSIUM CHLORIDE 20 MEQ/1
20 TABLET, EXTENDED RELEASE ORAL DAILY
Qty: 30 TABLET | Refills: 1 | Status: SHIPPED | OUTPATIENT
Start: 2024-07-02

## 2024-07-02 RX ORDER — DOCUSATE SODIUM 100 MG/1
100 CAPSULE, LIQUID FILLED ORAL 3 TIMES DAILY
Qty: 90 CAPSULE | Refills: 0 | Status: SHIPPED | OUTPATIENT
Start: 2024-07-02

## 2024-07-02 RX ORDER — FUROSEMIDE 40 MG/1
40 TABLET ORAL DAILY
Status: DISCONTINUED | OUTPATIENT
Start: 2024-07-02 | End: 2024-07-04 | Stop reason: HOSPADM

## 2024-07-02 RX ORDER — IPRATROPIUM BROMIDE AND ALBUTEROL SULFATE 2.5; .5 MG/3ML; MG/3ML
3 SOLUTION RESPIRATORY (INHALATION) EVERY 2 HOUR PRN
Status: DISCONTINUED | OUTPATIENT
Start: 2024-07-02 | End: 2024-07-04 | Stop reason: HOSPADM

## 2024-07-02 RX ORDER — POTASSIUM CHLORIDE 20 MEQ/1
20 TABLET, EXTENDED RELEASE ORAL DAILY
Status: DISCONTINUED | OUTPATIENT
Start: 2024-07-02 | End: 2024-07-04 | Stop reason: HOSPADM

## 2024-07-02 RX ORDER — TRAMADOL HYDROCHLORIDE 50 MG/1
50 TABLET ORAL EVERY 6 HOURS PRN
Qty: 28 TABLET | Refills: 0 | Status: SHIPPED | OUTPATIENT
Start: 2024-07-02

## 2024-07-02 RX ORDER — METOPROLOL TARTRATE 25 MG/1
25 TABLET, FILM COATED ORAL 2 TIMES DAILY
Qty: 60 TABLET | Refills: 1 | Status: SHIPPED | OUTPATIENT
Start: 2024-07-02

## 2024-07-02 RX ORDER — FUROSEMIDE 40 MG/1
40 TABLET ORAL DAILY
Qty: 30 TABLET | Refills: 1 | Status: SHIPPED | OUTPATIENT
Start: 2024-07-02

## 2024-07-02 RX ORDER — NALOXONE HYDROCHLORIDE 1 MG/ML
0.2 INJECTION INTRAMUSCULAR; INTRAVENOUS; SUBCUTANEOUS EVERY 5 MIN PRN
Status: DISCONTINUED | OUTPATIENT
Start: 2024-07-02 | End: 2024-07-04 | Stop reason: HOSPADM

## 2024-07-02 RX ORDER — COLCHICINE 0.6 MG/1
0.6 TABLET ORAL DAILY
Qty: 4 TABLET | Refills: 0 | Status: SHIPPED | OUTPATIENT
Start: 2024-07-02 | End: 2024-07-09

## 2024-07-02 RX ORDER — IPRATROPIUM BROMIDE AND ALBUTEROL SULFATE 2.5; .5 MG/3ML; MG/3ML
3 SOLUTION RESPIRATORY (INHALATION)
Status: DISCONTINUED | OUTPATIENT
Start: 2024-07-02 | End: 2024-07-02

## 2024-07-02 RX ORDER — TRAMADOL HYDROCHLORIDE 50 MG/1
50 TABLET ORAL EVERY 6 HOURS PRN
Status: DISCONTINUED | OUTPATIENT
Start: 2024-07-02 | End: 2024-07-04 | Stop reason: HOSPADM

## 2024-07-02 RX ORDER — IPRATROPIUM BROMIDE AND ALBUTEROL SULFATE 2.5; .5 MG/3ML; MG/3ML
3 SOLUTION RESPIRATORY (INHALATION) 3 TIMES DAILY
Status: DISCONTINUED | OUTPATIENT
Start: 2024-07-03 | End: 2024-07-04 | Stop reason: HOSPADM

## 2024-07-02 RX ORDER — HEPARIN SODIUM 5000 [USP'U]/ML
5000 INJECTION, SOLUTION INTRAVENOUS; SUBCUTANEOUS EVERY 8 HOURS
Status: DISCONTINUED | OUTPATIENT
Start: 2024-07-02 | End: 2024-07-04 | Stop reason: HOSPADM

## 2024-07-02 ASSESSMENT — PAIN SCALES - GENERAL
PAINLEVEL_OUTOF10: 6
PAINLEVEL_OUTOF10: 0 - NO PAIN
PAINLEVEL_OUTOF10: 3
PAINLEVEL_OUTOF10: 4
PAINLEVEL_OUTOF10: 0 - NO PAIN
PAINLEVEL_OUTOF10: 4

## 2024-07-02 ASSESSMENT — PAIN DESCRIPTION - LOCATION
LOCATION: BACK
LOCATION: CHEST

## 2024-07-02 ASSESSMENT — PAIN DESCRIPTION - DESCRIPTORS
DESCRIPTORS: SORE
DESCRIPTORS: SORE

## 2024-07-02 ASSESSMENT — COGNITIVE AND FUNCTIONAL STATUS - GENERAL
MOBILITY SCORE: 20
DAILY ACTIVITIY SCORE: 24
WALKING IN HOSPITAL ROOM: A LITTLE
STANDING UP FROM CHAIR USING ARMS: A LITTLE
MOVING TO AND FROM BED TO CHAIR: A LITTLE
CLIMB 3 TO 5 STEPS WITH RAILING: A LITTLE

## 2024-07-02 ASSESSMENT — PAIN - FUNCTIONAL ASSESSMENT
PAIN_FUNCTIONAL_ASSESSMENT: 0-10

## 2024-07-02 ASSESSMENT — ENCOUNTER SYMPTOMS
FATIGUE: 1
ALLERGIC/IMMUNOLOGIC NEGATIVE: 1
GASTROINTESTINAL NEGATIVE: 1
PALPITATIONS: 1
CHEST TIGHTNESS: 0
LIGHT-HEADEDNESS: 0
WEAKNESS: 1
PSYCHIATRIC NEGATIVE: 1
DIZZINESS: 0
ENDOCRINE NEGATIVE: 1
EYES NEGATIVE: 1
SHORTNESS OF BREATH: 1

## 2024-07-02 NOTE — PROGRESS NOTES
.[facility] Critical Care Medicine       Date:  7/2/2024  Patient:  Kamar Garcia  YOB: 1970  MRN:  [id number]   Admit Date:  6/28/2024  ========================================================================================================      History of Present Illness:  Kamar Garcia is a 53 y.o. year old male patient with Past Medical History of HTN, hypothyroidism, HFrEF (35-40%), HLD, prior CVA was evaluated in June by Dr. Lovell for surgical revascularization. He had stroke in June that prompted a LHC by echocardiogram. His LHC showed multivessel coronary artery disease including his LAD, D1, Circumflex, OM1, OM2 and RCA. In his best interest, patient opted for surgical revascularization.         Interval ICU Events:  6/28: Patient underwent CABG x 3 (LIMA-DIAG-LAD, SVG-OM2), Right Leg EVH and median sternotomy. Cardiopulmonary Bypass Time 118 min, XC time 95 min. Given 2200 ml of crystalloid fluid, 777 Cell Saver. UOP during procedure 1200mls. R and left Pleural Chest Tubes and 1 Mediastinal Chest tubes each with minimal sanguinous drainage. Required x 4 shocks s/p spb. Returned to ICU on nitroglycerin and epinephrine gtt due to low index    6/29: Off epinephrine gtt this am. Up to chair, minimal chest tube output overnight. Pain well controlled. Will plan to start BB this am, along with 40mg IV lasix, will add colchicine as well for pericardial rub. Plan to ambulate patient this am and remove chest tubes if no increase in output afterwards.     6/30: Continued off all vasoactive medications. Chest tubes removed, will removed cordis today and plan for transfer. Will start entresto today pending cardiology recommendations     7/1: Started entresto yesterday, diuresing well UOP 2.4L yesterday. Will plan to diurese again today. Cordis removed, V wires may insulate. Transfer out of ICU today      7/2:  Transferred back to ICU today for Afib RVR, requiring amio bolus x2 and gtt.  EP consult pending.  "       Medical History:  Past Medical History:   Diagnosis Date    Coronary artery disease     COVID-19     NOT VACCINATED    Dyslipidemia     HTN (hypertension)     Hyperlipidemia     Hypothyroidism     Stroke (Multi)      Past Surgical History:   Procedure Laterality Date    CARDIAC CATHETERIZATION      HERNIA REPAIR Right     inguinal     Medications Prior to Admission   Medication Sig Dispense Refill Last Dose    aspirin 81 mg EC tablet Take 1 tablet (81 mg) by mouth early in the morning..   6/28/2024 at 0300    atorvastatin (Lipitor) 40 mg tablet Take 1 tablet (40 mg) by mouth early in the morning..   6/27/2024    furosemide (Lasix) 40 mg tablet Take 1 tablet (40 mg) by mouth early in the morning..   6/27/2024    levothyroxine (Synthroid, Levoxyl) 50 mcg tablet Take 1 tablet (50 mcg) by mouth early in the morning..   6/27/2024    lisinopril 10 mg tablet Take 1 tablet (10 mg) by mouth early in the morning..   Past Week    metoprolol succinate XL (Toprol-XL) 50 mg 24 hr tablet Take 1 tablet (50 mg) by mouth early in the morning..   6/28/2024 at 0300    mupirocin (Bactroban) 2 % ointment Apply topically. Apply to both nostrils twice daily 5 days before surgery   6/27/2024     Patient has no known allergies.  Social History     Tobacco Use    Smoking status: Former     Types: Cigarettes    Smokeless tobacco: Former     Types: Snuff   Vaping Use    Vaping status: Never Used   Substance Use Topics    Alcohol use: Never    Drug use: Never     Family History   Problem Relation Name Age of Onset    Pancreatic cancer Mother      COPD Father         Review of Systems:  14 point review of systems was completed and negative except for those specially mention in my HPI    Physical Exam:    Heart Rate:  []   Temp:  [35.2 °C (95.4 °F)-37.4 °C (99.3 °F)]   Resp:  [16-35]   BP: ()/(55-93)   Height:  [175.3 cm (5' 9\")]   Weight:  [113 kg (248 lb 9.6 oz)-113 kg (250 lb)]   SpO2:  [90 %-98 %]     Physical Exam  Vitals " reviewed.   Constitutional:       General: He is awake.      Appearance: He is overweight.   HENT:      Head: Normocephalic and atraumatic.   Eyes:      Conjunctiva/sclera: Conjunctivae normal.      Pupils: Pupils are equal, round, and reactive to light.   Cardiovascular:      Pulses: Normal pulses.      Heart sounds: Heart sounds not distant. No murmur heard.     Comments: Afib RVR  Pulmonary:      Effort: Pulmonary effort is normal.      Breath sounds: Wheezing present.   Chest:      Comments: Medial Sternotomy Incision CDI   Abdominal:      General: Abdomen is flat. Bowel sounds are decreased.      Palpations: Abdomen is soft.      Tenderness: There is no abdominal tenderness.   Musculoskeletal:      Cervical back: Full passive range of motion without pain.      Right lower leg: No edema.      Left lower leg: No edema.   Skin:     General: Skin is warm.      Capillary Refill: Capillary refill takes less than 2 seconds.      Comments: Ev CDI    Neurological:      General: No focal deficit present.      Mental Status: He is alert and oriented to person, place, and time. Mental status is at baseline.   Psychiatric:         Mood and Affect: Mood normal.         Behavior: Behavior normal. Behavior is cooperative.         Thought Content: Thought content normal.         Judgment: Judgment normal.         Objective:    ECG 12 Lead    Result Date: 7/2/2024  Sinus tachycardia Possible Left atrial enlargement Inferior infarct (cited on or before 30-JUN-2024) T wave abnormality, consider lateral ischemia ** ** ACUTE MI / STEMI ** ** Consider right ventricular involvement in acute inferior infarct Abnormal ECG When compared with ECG of 02-JUL-2024 09:37, (unconfirmed) Sinus rhythm has replaced Atrial flutter ST more elevated in Inferior leads ST no longer depressed in Anterior leads    ECG 12 lead    Result Date: 7/2/2024  Atrial flutter with 2:1 AV conduction Inferior infarct (cited on or before 30-JUN-2024) ST & T wave  abnormality, consider lateral ischemia Abnormal ECG When compared with ECG of 01-JUL-2024 06:31, Atrial flutter has replaced Sinus rhythm Vent. rate has increased BY  62 BPM ST less elevated in Inferior leads ST now depressed in Anterior leads    Transthoracic Echo (TTE) Limited    Result Date: 7/1/2024          [place] [id number]  Tel [phone] Fax [phone] TRANSTHORACIC ECHOCARDIOGRAM REPORT Patient Name:      ERWIN Gibbs Physician:    [id number] Viktor Gonzalez MD Study Date:        7/1/2024              Ordering Provider:    [id number] ERWIN MYERS MRN/PID:           [id number]              Fellow: Accession#:        [id number]          Nurse: Date of Birth/Age: 1970 / 53 years Sonographer:          Di MAHONEY Gender:            M                     Additional Staff: Height:            175.26 cm             Admit Date:           6/12/2024 Weight:            115.21 kg             Admission Status:     Inpatient -                                                                Routine BSA / BMI:         2.29 m2 / 37.51 kg/m2 Department Location:  [facility] Blood Pressure: 141 /90 mmHg Study Type:    TRANSTHORACIC ECHO (TTE) LIMITED Diagnosis/ICD: Heart failure, unspecified-I50.9 Indication:    REASSESS LVEF S/P CABG 06-28-24 CPT Codes:     Echo Limited-28824 Patient History: CABG:              On 6/28/2024. Pertinent History: CAD, HTN, Hyperlipidemia, Cardiomyopathy and CVA. Study Detail: The following Echo studies were performed: 2D, Doppler and color               flow. Technically challenging study due to poor acoustic windows,               postoperative dressings, patient lying in supine position,               prominent lung artifact and body  habitus. Definity used as a               contrast agent for endocardial border definition. Total contrast               used for this procedure was 2 mL via IV push. Unable to obtain               suprasternal notch view. The patient was awake.  PHYSICIAN INTERPRETATION: Left Ventricle: Left ventricular ejection fraction is mildly decreased, by visual estimate at 40-45%. The left ventricular cavity size is normal. Spectral Doppler shows an impaired relaxation pattern of left ventricular diastolic filling. Left Atrium: The left atrium is mildly dilated. Right Ventricle: The right ventricle was not well visualized. Right ventricular systolic function not assessed. Right Atrium: The right atrium was not well visualized. Aortic Valve: The aortic valve was not well visualized. Aortic valve regurgitation was not assessed. Mitral Valve: The mitral valve is mildly thickened. Mitral valve regurgitation was not assessed. Tricuspid Valve: The tricuspid valve was not well visualized. Tricuspid regurgitation was not assessed. Pulmonic Valve: The pulmonic valve is not well visualized. The pulmonic valve regurgitation was not well visualized. Pericardium: A pericardial effusion was not well visualized. Aorta: The aortic root was not assessed.  CONCLUSIONS:  1. Poorly visualized anatomical structures due to suboptimal image quality.  2. Left ventricular ejection fraction is mildly decreased, by visual estimate at 40-45%.  3. Spectral Doppler shows an impaired relaxation pattern of left ventricular diastolic filling.  4. Right ventricular systolic function not assessed. QUANTITATIVE DATA SUMMARY: LV SYSTOLIC FUNCTION BY 2D PLANIMETRY (MOD):                      Normal Ranges: EF-A4C View:    46 % (>=55%) EF-A2C View:    39 % EF-Biplane:     44 % EF-Visual:      43 % LV EF Reported: 43 %  [id number] Viktor Gonzalez MD Electronically signed on 7/1/2024 at 3:06:30 PM  ** Final **     ECG 12 Lead    Result Date: 7/1/2024  Normal sinus rhythm  Possible Left atrial enlargement Inferior infarct (cited on or before 30-JUN-2024) T wave abnormality, consider lateral ischemia ** ** ACUTE MI / STEMI ** ** Consider right ventricular involvement in acute inferior infarct Abnormal ECG When compared with ECG of 30-JUN-2024 06:46, Serial changes of Inferior infarct Present Confirmed by Alexa Jimenez ([id number]) on 7/1/2024 9:40:53 AM    XR chest 1 view    Result Date: 7/1/2024  Interpreted By:  Jignesh Cortez, STUDY: XR CHEST 1 VIEW;  7/1/2024 5:07 am   INDICATION: Signs/Symptoms:s/p CABG.   COMPARISON: 06/30/2024.   ACCESSION NUMBER(S): [id number]   ORDERING CLINICIAN: KENN HERNANDEZ   FINDINGS: CARDIOMEDIASTINAL SILHOUETTE: Cardiomegaly, aortic prominence with calcification and postoperative changes of the mediastinum are similar to prior.   LUNGS: Left basilar atelectasis versus small infiltrate persists. Right infrahilar mild atelectasis also seen. No definite pleural effusion. No appreciable pneumothorax.   ABDOMEN: No remarkable upper abdominal findings.   BONES: Bones are stable.       1.  Left basilar atelectasis versus small infiltrate. 2. Right infrahilar mild atelectasis.       MACRO: None.   Signed by: Jignesh Cortez 7/1/2024 8:03 AM Dictation workstation:   [id number]      Results for orders placed or performed during the hospital encounter of 06/28/24 (from the past 24 hour(s))   Transthoracic Echo (TTE) Limited   Result Value Ref Range    LV Biplane EF 44 %    LV EF 43 %    LV A4C EF 46.3    POCT GLUCOSE   Result Value Ref Range    POCT Glucose 116 (H) 74 - 99 mg/dL   POCT GLUCOSE   Result Value Ref Range    POCT Glucose 117 (H) 74 - 99 mg/dL   CBC   Result Value Ref Range    WBC 13.8 (H) 4.4 - 11.3 x10*3/uL    nRBC 0.0 0.0 - 0.0 /100 WBCs    RBC 5.08 4.50 - 5.90 x10*6/uL    Hemoglobin 14.3 13.5 - 17.5 g/dL    Hematocrit 42.4 41.0 - 52.0 %    MCV 84 80 - 100 fL    MCH 28.1 26.0 - 34.0 pg    MCHC 33.7 32.0 - 36.0 g/dL    RDW 13.5 11.5 - 14.5 %     Platelets 242 150 - 450 x10*3/uL   Basic Metabolic Panel   Result Value Ref Range    Glucose 115 (H) 74 - 99 mg/dL    Sodium 135 (L) 136 - 145 mmol/L    Potassium 4.0 3.5 - 5.3 mmol/L    Chloride 98 98 - 107 mmol/L    Bicarbonate 28 21 - 32 mmol/L    Anion Gap 13 10 - 20 mmol/L    Urea Nitrogen 24 (H) 6 - 23 mg/dL    Creatinine 1.04 0.50 - 1.30 mg/dL    eGFR 86 >60 mL/min/1.73m*2    Calcium 9.1 8.6 - 10.3 mg/dL   Magnesium   Result Value Ref Range    Magnesium 2.05 1.60 - 2.40 mg/dL   POCT GLUCOSE   Result Value Ref Range    POCT Glucose 116 (H) 74 - 99 mg/dL   ECG 12 lead   Result Value Ref Range    Ventricular Rate 154 BPM    Atrial Rate 308 BPM    QRS Duration 90 ms    QT Interval 262 ms    QTC Calculation(Bazett) 419 ms    P Axis -62 degrees    R Axis -25 degrees    T Axis 147 degrees    QRS Count 25 beats    Q Onset 211 ms    P Onset 155 ms    P Offset 203 ms    T Offset 342 ms    QTC Fredericia 358 ms   POCT GLUCOSE   Result Value Ref Range    POCT Glucose 162 (H) 74 - 99 mg/dL   ECG 12 Lead   Result Value Ref Range    Ventricular Rate 107 BPM    Atrial Rate 107 BPM    IA Interval 142 ms    QRS Duration 106 ms    QT Interval 364 ms    QTC Calculation(Bazett) 485 ms    P Axis 41 degrees    R Axis -27 degrees    T Axis 117 degrees    QRS Count 18 beats    Q Onset 211 ms    P Onset 140 ms    P Offset 199 ms    T Offset 393 ms    QTC Fredericia 441 ms   Assessment/Plan:     I am currently managing this critically ill patient for the following problems:     Neuro/Psych/Pain Ctrl/Sedation:  #Post-Op Pain  #Hx CVA  -Neuro checks, CAM Assessment, Delirium precautions  -PRN Dilaudid, tramadol, oxycodone, Tylenol for pain control  -Robaxin and Lidocaine patches     Respiratory/ENT:  #Post-Op Respiratory Insufficiency  -Duonebs q4h and PRN  -Keep SpO2>92%     Cardiovascular:  #Triple Vessel CAD s/p CABG x 3  #Afib RVR, new onset  Patient underwent CABG x 3 (LIMA-DIAG-LAD, SVG-OM2), Right Leg EVH and median sternotomy.    -Daily BMP, keep K >4, Mg > 2  -CTS and EP following  -Spot diuresis  -continue ASA, statin, metop, entresto, colchicine  -Will need to discontinue his home lisinopril at discharge    -Amio bolus and gtt     GI:  -NPO- possible cardiac procedure, then transition to cardiac diet     Renal/Volume Status (Intra & Extravascular):  -Monitor RFP  -Keep K>4, Mg>2    Endocrine  #Hyperglycemia, improved  -Monitor for s/s hypo and hyperglycemia     Infectious Disease:  -Monitor SIRS     Heme/Onc:  #Acute Blood Loss Anemia Post-Op  -Baseline Hgb 14.0  -Daily CBC  -Transfuse for Hgb goal > 7 or massive blood loss with hemodynamic instability     ORTHO/MSK:  -PT/OT, Cardiac Rehab      Ethics/Code Status:  FULL CODE     :  DVT Prophylaxis: Heparin subcu   GI Prophylaxis: NA  Bowel Regimen: Docusate, Senna, Miralax  Diet: NPO->Cardiac Diet   CVC: NA  [place]: NA  Gonzalez: NA   Restraints: NA  Dispo: Admit to ICU    Critical Care Time: 40    Plan Discussed with Dr. Sanchez and CTS    Chad Galvin, APRN-CNP  Critical Care Medicine   [facility]

## 2024-07-02 NOTE — SIGNIFICANT EVENT
Pt -160s. STAT EKG showing atrial flutter. Patient remains hemodynamically stable and asymptomatic. Amio blous and IV ordered STAT. Discharge held and pt transferred to ICU for initiation of amio drip.

## 2024-07-02 NOTE — CONSULTS
Inpatient consult to Cardiology  Consult performed by: SHYANN Schneider  Consult ordered by: SHYANN Aguilar  Reason for consult: A-fib w/RVR  Assessment/Recommendations: Patient was seen, chart reviewed.    Patient was admitted for coronary artery disease-coronary bypass graft surgery x 3 with LIMA to diagonal and LAD, SVG to obtuse marginal branch.  Patient developed atrial fibrillation.  Agree with IV amiodarone for now  Patient is back in sinus rhythm  Will continue with IV amiodarone and later switched to oral amiodarone as described at below  Continue telemetry  Holter monitor as an outpatient and follow-up with electrophysiology service to decide long-term use of high-risk medication.  Risk factor modification and lifestyle modification discussed with patient. Diet , exercise and hydration discussed with patient.  Please excuse any errors in grammar or translation related to this dictation.  Voice recognition software was utilized to prepare this document.            Interventional Cardiology Consult Note  Patient: Kamar Garcia  Unit/Bed: 08/08-A  YOB: 1970  MRN: [id number]  Acct: [id number]   Admitting Diagnosis: Coronary artery disease of native artery of native heart with stable angina pectoris (CMS-HCC) [I25.118]  Date:  6/28/2024  Hospital Day: 4  Attending: DO Jamie South Cardiologist:  SHYANN Schneider    History of Present Illness:  Kamar Garcia is a 53 year old male with past medical history significant for  HTN, hypothyroidism, HFrEF (35-40%), HLD, prior CVA as well as recent CVA in June, 2024 which prompted a [facility] for further workup.  Patient was found to have severe multivessel CAD including disease in his LAD, diagonal, circumflex, OM1, OM2 and RCA.  Patient was referred to Dr. Lovell for surgical revascularization.  Patient was admitted electively on 6/28/2024 for coronary artery bypass grafting under the care of Dr. Lovell.  Patient is  currently postop day #4 from CABG x 3 with a LIMA to diagonal to LAD and SVG to OM.  Patient was doing well in the postoperative period.  He was off vasoactive medications on 6/29.  His chest tubes were removed on 6/30.  He was initiated on beta-blocker therapy, Entresto and IV Lasix.  He was also initiated on colchicine for pericardial rub.  He was transferred to the [facility] on telemetry on 7/1.  He was to be discharged to home today, however developed acute new onset atrial fibrillation w/ RVR with rates 160 to 180's.  He was given an IV amiodarone bolus and transferred to the ICU for initiation of amiodarone drip.  He was given an additional bolus of amiodarone upon arrival to the ICU.  Patient converted back to SR/ST shortly thereafter.  The electrophysiology service is consulted for further management of new onset A-fib.    Allergies:  No Known Allergies     PMHx:  Past Medical History:   Diagnosis Date    Coronary artery disease     COVID-19     NOT VACCINATED    Dyslipidemia     HTN (hypertension)     Hyperlipidemia     Hypothyroidism     Stroke (Multi)      PSHx:  Past Surgical History:   Procedure Laterality Date    CARDIAC CATHETERIZATION      HERNIA REPAIR Right     inguinal     Social Hx:  Remote tobacco use  Denies alcohol use  Denies drug use    Family Hx:  Family History   Problem Relation Name Age of Onset    Pancreatic cancer Mother      COPD Father       Review of Systems:   Review of Systems   Constitutional:  Positive for fatigue.   HENT: Negative.     Eyes: Negative.    Respiratory:  Positive for shortness of breath. Negative for chest tightness.    Cardiovascular:  Positive for chest pain and palpitations.   Gastrointestinal: Negative.    Endocrine: Negative.    Genitourinary: Negative.    Skin: Negative.    Allergic/Immunologic: Negative.    Neurological:  Positive for weakness. Negative for dizziness and light-headedness.   Psychiatric/Behavioral: Negative.     All other systems reviewed and are  negative.    Physical Examination:    Visit Vitals  BP 91/62 (BP Location: Left arm, Patient Position: Lying)   Pulse 102   Temp 36 °C (96.8 °F) (Temporal)   Resp (!) 31      Physical Exam  Vitals reviewed.   Constitutional:       General: He is awake.      Appearance: He is overweight.   HENT:      Head: Normocephalic and atraumatic.   Eyes:      Conjunctiva/sclera: Conjunctivae normal.      Pupils: Pupils are equal, round, and reactive to light.   Cardiovascular:      Pulses: Normal pulses.      Heart sounds: Heart sounds not distant. No murmur heard.     Comments: Afib RVR  Pulmonary:      Effort: Pulmonary effort is normal.      Breath sounds: Wheezing present.   Chest:      Comments: Medial Sternotomy Incision CDI   Abdominal:      General: Abdomen is flat. Bowel sounds are decreased.      Palpations: Abdomen is soft.      Tenderness: There is no abdominal tenderness.   Musculoskeletal:      Cervical back: Full passive range of motion without pain.      Right lower leg: No edema.      Left lower leg: No edema.   Skin:     General: Skin is warm.      Capillary Refill: Capillary refill takes less than 2 seconds.      Comments: Ev CDI    Neurological:      General: No focal deficit present.      Mental Status: He is alert and oriented to person, place, and time. Mental status is at baseline.   Psychiatric:         Mood and Affect: Mood normal.         Behavior: Behavior normal. Behavior is cooperative.         Thought Content: Thought content normal.         Judgment: Judgment normal.      LABS:  CBC:   Results from last 7 days   Lab Units 07/02/24  0453 07/01/24  0429 06/30/24  0339   WBC AUTO x10*3/uL 13.8* 15.8* 16.2*   RBC AUTO x10*6/uL 5.08 5.36 5.04   HEMOGLOBIN g/dL 14.3 15.1 14.1   HEMATOCRIT % 42.4 44.8 42.6   MCV fL 84 84 85   MCH pg 28.1 28.2 28.0   MCHC g/dL 33.7 33.7 33.1   RDW % 13.5 13.6 13.6   PLATELETS AUTO x10*3/uL 242 196 177     CMP:    Results from last 7 days   Lab Units 07/02/24  0453  07/01/24  0429 06/30/24  0339   SODIUM mmol/L 135* 134* 134*   POTASSIUM mmol/L 4.0 3.7 4.2   CHLORIDE mmol/L 98 99 99   CO2 mmol/L 28 25 29   BUN mg/dL 24* 23 19   CREATININE mg/dL 1.04 0.98 1.14   GLUCOSE mg/dL 115* 134* 107*   CALCIUM mg/dL 9.1 9.5 9.4     Magnesium:  Results from last 7 days   Lab Units 07/02/24  0453 07/01/24 0429 06/30/24  0339   MAGNESIUM mg/dL 2.05 2.00 1.92     Current Medications:    Current Facility-Administered Medications:     acetaminophen (Tylenol) tablet 650 mg, 650 mg, oral, q6h PRN, SHYANN Aguilar    amiodarone (Nexterone) 360 mg in dextrose,iso-osm 200 mL (1.8 mg/mL) infusion (premix), 0.5-1 mg/min, intravenous, Continuous, SHYANN Siddiqui, Last Rate: 33.3 mL/hr at 07/02/24 1136, 1 mg/min at 07/02/24 1136    aspirin EC tablet 81 mg, 81 mg, oral, Daily, JES Glaser-CNP, 81 mg at 07/02/24 0917    atorvastatin (Lipitor) tablet 40 mg, 40 mg, oral, Nightly, JES Glaser-CNP, 40 mg at 07/01/24 2048    bisacodyl (Dulcolax) suppository 10 mg, 10 mg, rectal, Daily PRN, Adrianna Saucedo APRN-CNP    colchicine tablet 0.6 mg, 0.6 mg, oral, Daily, JES Glaser-CNP, 0.6 mg at 07/02/24 0917    dextrose 50 % injection 25 g, 25 g, intravenous, q15 min PRN **OR** glucagon (Glucagen) injection 1 mg, 1 mg, intramuscular, q15 min PRN, Adrianna Saucedo APRN-CNP    docusate sodium (Colace) capsule 100 mg, 100 mg, oral, TID, JES Glaser-CNP, 100 mg at 07/02/24 1428    furosemide (Lasix) tablet 40 mg, 40 mg, oral, Daily, JES Glaser-CNP, 40 mg at 07/02/24 0851    heparin (porcine) injection 5,000 Units, 5,000 Units, subcutaneous, q8h, JES Aguilar-CNP, 5,000 Units at 07/02/24 1101    insulin lispro (HumaLOG) injection 0-15 Units, 0-15 Units, subcutaneous, Before meals & nightly, JES Glaser-CNP, 5 Units at 07/02/24 1101    ipratropium-albuteroL (Duo-Neb) 0.5-2.5 mg/3 mL nebulizer solution 3  mL, 3 mL, nebulization, q4h, Chad Galvin, JES-CNP, 3 mL at 07/02/24 1106    ipratropium-albuteroL (Duo-Neb) 0.5-2.5 mg/3 mL nebulizer solution 3 mL, 3 mL, nebulization, q2h PRN, SHEILA AguilarCNP    levothyroxine (Synthroid, Levoxyl) tablet 50 mcg, 50 mcg, oral, Daily, JES Glaser-CNP, 50 mcg at 07/02/24 0604    lidocaine 4 % patch 1 patch, 1 patch, transdermal, Daily, Adrianna Saucedo APRN-CNP, 1 patch at 07/02/24 0900    magnesium hydroxide (Milk of Magnesia) 2,400 mg/10 mL suspension 10 mL, 10 mL, oral, Daily PRN, Adrianna Saucedo APRN-CNP    magnesium sulfate IV 2 g, 2 g, intravenous, q6h PRN, Adrianna Saucedo APRN-CNP    magnesium sulfate IV 4 g, 4 g, intravenous, q6h PRN, Adrianna Saucedo APRN-CNP    methocarbamol (Robaxin) tablet 500 mg, 500 mg, oral, q8h CLAUDIA, Adrianna Saucedo APRN-CNP, 500 mg at 07/02/24 1428    metoclopramide (Reglan) tablet 10 mg, 10 mg, oral, q6h PRN **OR** metoclopramide (Reglan) injection 10 mg, 10 mg, intravenous, q6h PRN, Adrianna Saucedo APRN-CNP    metoprolol tartrate (Lopressor) tablet 25 mg, 25 mg, oral, BID, Adrianna Saucedo APRN-CNP, 25 mg at 07/02/24 0917    mupirocin (Bactroban) 2 % ointment 0.5 Application, 0.5 Application, Topical, BID, Adrianna Saucedo APRN-CNP, 0.5 Application at 07/02/24 0919    naloxone (Narcan) injection 0.2 mg, 0.2 mg, intravenous, q5 min PRN, Patricia Patel, [facility]    ondansetron ODT (Zofran-ODT) disintegrating tablet 4 mg, 4 mg, oral, q8h PRN **OR** ondansetron (Zofran) injection 4 mg, 4 mg, intravenous, q8h PRN, JES Glaser-CNP, 4 mg at 07/01/24 0532    oxyCODONE (Roxicodone) immediate release tablet 5 mg, 5 mg, oral, q4h PRN, JES Glaser-CNP, 5 mg at 07/02/24 0607    polyethylene glycol (Glycolax, Miralax) packet 17 g, 17 g, oral, BID, JES Glaser-CNP, 17 g at 07/02/24 0921    potassium chloride CR (Klor-Con M20) ER tablet 20 mEq, 20 mEq, oral, q6h PRN, Adrianna MONTIEL  SHEILA SaucedoCNP    potassium chloride CR (Klor-Con M20) ER tablet 20 mEq, 20 mEq, oral, Daily, SHYANN Aguilar, 20 mEq at 07/02/24 0918    potassium chloride CR (Klor-Con M20) ER tablet 40 mEq, 40 mEq, oral, q6h PRN, Adrianna Saucedo APRN-CNP, 40 mEq at 07/01/24 0807    sacubitriL-valsartan (Entresto) 24-26 mg per tablet 1 tablet, 1 tablet, oral, BID, JES Glaser-CNP, 1 tablet at 07/02/24 0917    simethicone (Mylicon) chewable tablet 80 mg, 80 mg, oral, 4x daily, JES Glaser-CNP, 80 mg at 07/02/24 1428    traMADol (Ultram) tablet 50 mg, 50 mg, oral, q6h PRN, SHYANN Aguilar    ECG 12 Lead  Result Date: 7/2/2024  Sinus tachycardia Possible Left atrial enlargement Inferior infarct (cited on or before 30-JUN-2024) T wave abnormality, consider lateral ischemia ** ** ACUTE MI / STEMI ** ** Consider right ventricular involvement in acute inferior infarct Abnormal ECG When compared with ECG of 02-JUL-2024 09:37, (unconfirmed) Sinus rhythm has replaced Atrial flutter ST more elevated in Inferior leads ST no longer depressed in Anterior leads    ECG 12 lead  Result Date: 7/2/2024  Atrial flutter with 2:1 AV conduction Inferior infarct (cited on or before 30-JUN-2024) ST & T wave abnormality, consider lateral ischemia Abnormal ECG When compared with ECG of 01-JUL-2024 06:31, Atrial flutter has replaced Sinus rhythm Vent. rate has increased BY  62 BPM ST less elevated in Inferior leads ST now depressed in Anterior leads     Encounter Date: 06/28/24   ECG 12 Lead   Result Value    Ventricular Rate 107    Atrial Rate 107    SC Interval 142    QRS Duration 106    QT Interval 364    QTC Calculation(Bazett) 485    P Axis 41    R Axis -27    T Axis 117    QRS Count 18    Q Onset 211    P Onset 140    P Offset 199    T Offset 393    QTC Fredericia 441    Narrative    Sinus tachycardia  Possible Left atrial enlargement  Inferior infarct (cited on or before 30-JUN-2024)  T wave  abnormality, consider lateral ischemia  ** ** ACUTE MI / STEMI ** **  Consider right ventricular involvement in acute inferior infarct  Abnormal ECG  When compared with ECG of 02-JUL-2024 09:37, (unconfirmed)  Sinus rhythm has replaced Atrial flutter  ST more elevated in Inferior leads  ST no longer depressed in Anterior leads      Tele monitoring: 's    I/O:    Intake/Output Summary (Last 24 hours) at 7/2/2024 1456  Last data filed at 7/2/2024 1238  Gross per 24 hour   Intake 820 ml   Output 550 ml   Net 270 ml      Assessment/Plan:  1.   Multivessel coronary artery disease   -Post-op day #4 s/p CABG x 3 with a LIMA to diagonal to LAD and an SVG to OM per Dr. Lovell.   -Was doing well with plans for discharge today until he developed A-fib w/RVR this AM.  2.   Ischemic cardiomyopathy   -Preop LVEF 40 to 45%  -Initiated on GDMT per general cardiology   3.   New onset atrial fibrillation w/ RVR   -Patient given an IV Amiodarone bolus x 2 and initiated on Amiodarone gtt.  Transferred back to the ICU.  Patient converted back to SR/'s approximately 3 hours later.   -Agree with IV Amiodarone   -Will continue IV Amiodarone overnight and will switch to oral Amiodarone 200 mg PO BID x 5 days starting 7/3/2024, then decrease to Amiodarone 200 mg daily thereafter.     -Will arrange an outpatient Holter and follow up with the EP service in 4-6 weeks.   4.    Benign essential hypertension   -Stable  5.    Dyslipidemia   -On high intensity statin therapy       Electronically signed by JES Schneider-CNP on 7/2/2024 at 2:56 PM

## 2024-07-02 NOTE — CONSULTS
"Nutrition Initial Assessment:   Nutrition Assessment         Patient is a 53 y.o. male presenting with coronary artery disease of native artery of native heart with stable angina pectoris      Nutrition History:  Energy Intake: Good > 75 %  Food and Nutrient History: RD consulted for nutriton assessment/recommendation s/p CABG and for Richard education. S/p CABG x3 on 6/28. Reports good intakes PTA, with 1 meal per day. Usually cooks for self, has been trying to use seasonings low in sodium. Denies nutritional concerns PTA. Requested cardiac diet education, completed this date.  Vitamin/Herbal Supplement Use: none  Food Allergies/Intolerances:  None  GI Symptoms: None  Oral Problems: None       Anthropometrics:  Height: 175.3 cm (5' 9\")   Weight: 113 kg (248 lb 9.6 oz)   BMI (Calculated): 36.69  IBW/kg (Dietitian Calculated): 72.7 kg          Weight History:     Wt Readings from Last 10 Encounters:   07/02/24 113 kg (248 lb 9.6 oz)   06/11/24 112 kg (246 lb)         Weight Change %:  Weight History / % Weight Change: Denies unintentional wt changes PTA    Nutrition Focused Physical Exam Findings:  defer: denied recent unintentional wt changes, reports good appetite/intakes recently, no visual signs of muscle/fat losses noted    Edema:  Edema Location: generalized non-pitting edema per nursing assessment  Physical Findings:  Skin: Positive (incisions)    Nutrition Significant Labs:  BMP Trend:   Results from last 7 days   Lab Units 07/02/24  0453 07/01/24  0429 06/30/24  0339 06/29/24  0312   GLUCOSE mg/dL 115* 134* 107* 139*   CALCIUM mg/dL 9.1 9.5 9.4 8.8   SODIUM mmol/L 135* 134* 134* 134*   POTASSIUM mmol/L 4.0 3.7 4.2 4.8   CO2 mmol/L 28 25 29 23   CHLORIDE mmol/L 98 99 99 103   BUN mg/dL 24* 23 19 20   CREATININE mg/dL 1.04 0.98 1.14 1.15    , A1C:No results found for: \"HGBA1C\"    Nutrition Specific Medications:  Reviewed    I/O:   Last BM Date: 07/01/24;      Dietary Orders (From admission, onward)       Start   "   Ordered    07/01/24 1035  Oral nutritional supplements  Until discontinued        Question Answer Comment   Deliver with Breakfast    Deliver with Dinner    Select supplement: Richard        07/01/24 1034    06/29/24 1116  Adult diet Cardiac; 70 gm fat; 2 - 3 grams Sodium  Diet effective now        Question Answer Comment   Diet type Cardiac    Fat restriction: 70 gm fat    Sodium restriction: 2 - 3 grams Sodium        06/29/24 1115    06/29/24 0049  May Participate in Room Service  Once        Question:  .  Answer:  Yes    06/29/24 0048                     Estimated Needs:   Total Energy Estimated Needs (kCal): 2330 kCal  Method for Estimating Needs: 2330 kcal (MSJ x 1.2 activity factor, 1.2 stress factor)  Total Protein Estimated Needs (g): 87 g  Method for Estimating Needs: 1.2 g/kg IBW  Total Fluid Estimated Needs (mL):  (per MD)          Nutrition Diagnosis   Malnutrition Diagnosis  Patient has Malnutrition Diagnosis: No    Nutrition Diagnosis  Patient has Nutrition Diagnosis: Yes  Diagnosis Status (1): New  Nutrition Diagnosis 1: Increased nutrient needs  Related to (1): physiological causes increasing nutrient needs  As Evidenced by (1): wounds       Nutrition Interventions/Recommendations         Nutrition Prescription:  Individualized Nutrition Prescription Provided for : Cardiac diet. Richard BID        Nutrition Interventions:   Interventions: Meals and snacks, Medical food supplement  Goal: Consumes 3 meals per day  Medical Food Supplement: Commercial beverage  Goal: Richard BID (provides 90 kcal and 2.5 g protein per packet)    Collaboration and Referral of Nutrition Care: Collaboration by nutrition professional with other providers  Goal: RN    Nutrition Education:      Education Documentation  Nutrition Care Manual, taught by Nancy Hayes RDN, LD at 7/2/2024 10:07 AM.  Learner: Patient  Readiness: Acceptance  Method: Explanation, Handout  Response: Verbalizes Understanding  Comment: Educated on cardiac  diet and importance of Richard in wound healing. Reviewed lean meats, low fat dairy, increasing fruits/veggies and whole grains. Discussed healthy seasonings. Reviewed healthy snacks and cooking practices. Provided Richard handout.    Also reviewed label reading for cardiac diet. All questions answered at this time.            Nutrition Monitoring and Evaluation   Food/Nutrient Related History Monitoring  Monitoring and Evaluation Plan: Energy intake, Amount of food, Fluid intake  Energy Intake: Estimated energy intake  Criteria: Pt meets >75% of estimated energy needs  Fluid Intake: Estimated fluid intake  Criteria: Meets >75% of estimated fluid needs  Amount of Food: Estimated amout of food, Medical food intake  Criteria: Pt consumes >75% of meals and supplements    Body Composition/Growth/Weight History  Monitoring and Evaluation Plan: Weight  Weight: Measured weight  Criteria: Maintains stable weight    Biochemical Data, Medical Tests and Procedures  Monitoring and Evaluation Plan: Glucose/endocrine profile, Electrolyte/renal panel  Electrolyte and Renal Panel: Sodium, Potassium, Phosphorus  Criteria: Electrolytes WNL  Glucose/Endocrine Profile: Glucose, casual  Criteria: BG within desirable range    Nutrition Focused Physical Findings  Monitoring and Evaluation Plan: Skin  Skin: Impaired wound healing  Criteria: Promote wound healing through adequate nutrition         Time Spent (min): 45 minutes

## 2024-07-02 NOTE — PROGRESS NOTES
Occupational Therapy                 Therapy Communication Note    Patient Name: Kamar Garcia  MRN: [id number]  Today's Date: 7/2/2024     Discipline: Occupational Therapy    Missed Visit Reason:  Pt -160s. STAT EKG showing atrial flutter, pt transferred to ICU for initiation of amio drip. Will reattempt next tx date as appropriate.    Missed Time: Hold 11:04

## 2024-07-02 NOTE — PROGRESS NOTES
Physical Therapy                 Therapy Communication Note    Patient Name: Kamar Garcia  MRN: [id number]  Today's Date: 7/2/2024     Discipline: Physical Therapy    Missed Visit Reason: (09:30)Held PT treatment due to HR 150s-160s at rest. (10:25)Patient being transferred to SICU. Will re-attempt as appropriate.

## 2024-07-02 NOTE — DISCHARGE INSTRUCTIONS
Best exercise is walking 3-4 times per day. Taking short frequent walks instead of long walks helps to build stamina and reduces the incidence of DVTs. Gradually increase activity level every day.   Maintain sternal precautions for 6 weeks following surgery  May shower, no tub bathing for 2 weeks  Weigh yourself daily  Notify cardiac surgeon for any fevers, increased incisional pain or drainage or other concerning symptoms   No driving for 6 weeks. May wear a seatbelt, but must ride in back seat.

## 2024-07-02 NOTE — SIGNIFICANT EVENT
Pt arrived to SICU from 8S. Pt connected to monitor. HR in the 160s. All other VSS. Pt asymptomatic at this time. Pt A&Ox4. Pt oriented to room. Call light within reach.

## 2024-07-02 NOTE — DISCHARGE SUMMARY
Discharge Diagnosis  Coronary artery disease of native artery of native heart with stable angina pectoris (CMS-HCC)    Issues Requiring Follow-Up  S/P CABG  ICM    Test Results Pending At Discharge  Pending Labs       No current pending labs.            Hospital Course   Mr. Garcia is a 53 year old male with PMHx of HTN, HLD, ICM (EF 35-40%), CAD, hypothyroidism, and recent right frontal and parietal cerebral infarct. Echocardiogram was obtained as part of stroke work up which revealed moderately reduced LV function with EF 35-40%. Pt subsequently underwent coronary angiogram revealing multivessel coronary artery disease and pt referred to cardiac surgery for consideration of surgical revascularization.      On June 28th, 2024 pt presented to [facility] and underwent CABG x3 with LIMA-Diag-LAD, SVG-OM2; endoscopic harvest of R great saphenous vein; mediansternotomy. CPB time 118 min. XC time 95 min. Received 2200 ml of crystalloid fluid, 777 mL Cell Saver. UOP during procedure 1200mls. R and left Pleural Chest Tubes and 1 Mediastinal Chest tubes each with sanguinous drainage. Had recurring VF coming off CPB requiring defib x4 with restoration of sinus rhythm. Pt arrived to SICU on EPI and Nitroglycerine drips, intubated and paralyzed, in critical but stable condition. Pt was weaned from ventilator and extubated to nasal canula a few hour after arriving to ICU. Nitoglycerine and EPI weaned off prior to extubation.  Remainder of his postoperative course has been uneventful.  No issues with recurrent atrial or ventricular arrhythmias, responded well to diuresis.  Initiated on GDMT with metoprolol, Entresto, aspirin, and statin.  Evaluated by PT/OT and determined to be functionally capable for home discharge.  POD #4, discharge was held due to patient developing new onset A-fib with RVR. Remained asymptomatic and hemodynamically stable. Given 2 bolus doses of IV amiodarone followed by infusion with subsequent conversion  within an hour to sinus rhythm. Has remained in sinus rhythm since that time. Transferred to ICU for amiodarone infusion and arrhythmia monitoring. Overnight no recurring atrial arrhythmias. Electrophysiology is following with recommendations to discontinue IV amiodarone and transition to p.o. 200 mg twice daily for 5 days followed by 200 mg daily with outpatient Holter monitor to be arranged. This morning he is afebrile, maintaining sinus rhythm with adequate saturations on room air. Clinically appears to be near euvolemic with equivocal length of stay fluid balance Plan to continue maintenance dose Lasix 40 mg daily with potassium supplementation. Postoperative pain is well-controlled. Ambulating independently. Tolerating diet and moving bowels. Patient determined to be suitable for discharge. Home health care services have been arranged.  Nursing navigator has been consulted for assistance with medications as patient is self-pay.  He will be seen in follow-up with cardiac surgery in 1 week for wound check and suture removal, and again in 6 weeks with chest x-ray.  Follow-up with local cardiologist as ordered.  Discharge instructions including driving restrictions, activity limitations, sternal precautions, medication changes and signs and symptoms of infection were reviewed at length with patient.    Pertinent Physical Exam At Time of Discharge  Physical Exam  Constitutional:       General: He is not in acute distress.  HENT:      Head: Normocephalic.      Nose: Nose normal.      Mouth/Throat:      Mouth: Mucous membranes are moist.   Eyes:      Pupils: Pupils are equal, round, and reactive to light.   Cardiovascular:      Rate and Rhythm: Normal rate and regular rhythm.      Pulses: Normal pulses.      Comments: Epicardial wires removed  Pericardial rub resolved  Pulmonary:      Comments: Improving inspiratory effort, spirometry volumes 1200 mL  Sternum stable, small hematoma at manubrium otherwise sternotomy is  well-approximated without erythema  No drainage from chest tube insertion site sutures intact  Bilateral breath sounds clear to auscultation  Abdominal:      General: Bowel sounds are normal.      Comments: LBM 7/3  Genitourinary:     Comments: Voiding clear yellow  Musculoskeletal:         General: Normal range of motion.      Cervical back: Normal range of motion.      Comments: Trace bilateral lower extremity edema   Skin:     General: Skin is warm and dry.      Comments: Right lower extremity EVH incision well-approximated without erythema   Neurological:      General: No focal deficit present.      Mental Status: He is alert and oriented to person, place, and time.   Psychiatric:         Mood and Affect: Mood normal.      Home Medications     Medication List      START taking these medications     colchicine 0.6 mg tablet; Take 1 tablet (0.6 mg) by mouth once daily for   7 doses.   docusate sodium 100 mg capsule; Commonly known as: Colace; Take 1   capsule (100 mg) by mouth 3 times a day.   metoprolol tartrate 25 mg tablet; Commonly known as: Lopressor; Take 1   tablet (25 mg) by mouth 2 times a day.   potassium chloride CR 20 mEq ER tablet; Commonly known as: Klor-Con M20;   Take 1 tablet (20 mEq) by mouth once daily. Do not crush or chew.   sacubitriL-valsartan 24-26 mg tablet; Commonly known as: Entresto; Take   1 tablet by mouth 2 times a day.   traMADol 50 mg tablet; Commonly known as: Ultram; Take 1 tablet (50 mg)   by mouth every 6 hours if needed for moderate pain (4 - 6).     CHANGE how you take these medications     furosemide 40 mg tablet; Commonly known as: Lasix; Take 1 tablet (40 mg)   by mouth once daily.; What changed: when to take this     CONTINUE taking these medications     aspirin 81 mg EC tablet   atorvastatin 40 mg tablet; Commonly known as: Lipitor   levothyroxine 50 mcg tablet; Commonly known as: Synthroid, Levoxyl     STOP taking these medications     lisinopril 10 mg tablet    metoprolol succinate XL 50 mg 24 hr tablet; Commonly known as: Toprol-XL   mupirocin 2 % ointment; Commonly known as: Bactroban       Outpatient Follow-Up  Future Appointments   Date Time Provider Department [place]   7/11/2024 10:00 AM CARDSURG ELY [id number] NURSE [id number] [place]   8/13/2024  1:00 PM Hubert Lovell MD [id number] [place]       Adrianna Saucedo, APRN-CNP

## 2024-07-03 ENCOUNTER — APPOINTMENT (OUTPATIENT)
Dept: CARDIOLOGY | Facility: HOSPITAL | Age: 54
End: 2024-07-03
Payer: MEDICAID

## 2024-07-03 ENCOUNTER — PHARMACY VISIT (OUTPATIENT)
Dept: PHARMACY | Facility: CLINIC | Age: 54
End: 2024-07-03
Payer: MEDICAID

## 2024-07-03 LAB
ANION GAP SERPL CALC-SCNC: 15 MMOL/L (ref 10–20)
ATRIAL RATE: 91 BPM
BASOPHILS # BLD AUTO: 0.09 X10*3/UL (ref 0–0.1)
BASOPHILS NFR BLD AUTO: 0.7 %
BUN SERPL-MCNC: 27 MG/DL (ref 6–23)
CALCIUM SERPL-MCNC: 9.3 MG/DL (ref 8.6–10.3)
CHLORIDE SERPL-SCNC: 95 MMOL/L (ref 98–107)
CO2 SERPL-SCNC: 28 MMOL/L (ref 21–32)
CREAT SERPL-MCNC: 1.02 MG/DL (ref 0.5–1.3)
EGFRCR SERPLBLD CKD-EPI 2021: 88 ML/MIN/1.73M*2
EOSINOPHIL # BLD AUTO: 0.09 X10*3/UL (ref 0–0.7)
EOSINOPHIL NFR BLD AUTO: 0.7 %
ERYTHROCYTE [DISTWIDTH] IN BLOOD BY AUTOMATED COUNT: 13.8 % (ref 11.5–14.5)
GLUCOSE BLD MANUAL STRIP-MCNC: 103 MG/DL (ref 74–99)
GLUCOSE BLD MANUAL STRIP-MCNC: 110 MG/DL (ref 74–99)
GLUCOSE BLD MANUAL STRIP-MCNC: 127 MG/DL (ref 74–99)
GLUCOSE BLD MANUAL STRIP-MCNC: 97 MG/DL (ref 74–99)
GLUCOSE SERPL-MCNC: 119 MG/DL (ref 74–99)
HCT VFR BLD AUTO: 41.6 % (ref 41–52)
HGB BLD-MCNC: 14.1 G/DL (ref 13.5–17.5)
IMM GRANULOCYTES # BLD AUTO: 0.05 X10*3/UL (ref 0–0.7)
IMM GRANULOCYTES NFR BLD AUTO: 0.4 % (ref 0–0.9)
LYMPHOCYTES # BLD AUTO: 3.23 X10*3/UL (ref 1.2–4.8)
LYMPHOCYTES NFR BLD AUTO: 23.6 %
MAGNESIUM SERPL-MCNC: 2.13 MG/DL (ref 1.6–2.4)
MCH RBC QN AUTO: 28.2 PG (ref 26–34)
MCHC RBC AUTO-ENTMCNC: 33.9 G/DL (ref 32–36)
MCV RBC AUTO: 83 FL (ref 80–100)
MONOCYTES # BLD AUTO: 1.21 X10*3/UL (ref 0.1–1)
MONOCYTES NFR BLD AUTO: 8.9 %
NEUTROPHILS # BLD AUTO: 8.99 X10*3/UL (ref 1.2–7.7)
NEUTROPHILS NFR BLD AUTO: 65.7 %
NRBC BLD-RTO: 0 /100 WBCS (ref 0–0)
P AXIS: -3 DEGREES
P OFFSET: 199 MS
P ONSET: 143 MS
PLATELET # BLD AUTO: 260 X10*3/UL (ref 150–450)
POTASSIUM SERPL-SCNC: 3.8 MMOL/L (ref 3.5–5.3)
PR INTERVAL: 132 MS
Q ONSET: 209 MS
QRS COUNT: 15 BEATS
QRS DURATION: 106 MS
QT INTERVAL: 396 MS
QTC CALCULATION(BAZETT): 487 MS
QTC FREDERICIA: 455 MS
R AXIS: -3 DEGREES
RBC # BLD AUTO: 5 X10*6/UL (ref 4.5–5.9)
SODIUM SERPL-SCNC: 134 MMOL/L (ref 136–145)
T AXIS: 127 DEGREES
T OFFSET: 407 MS
VENTRICULAR RATE: 91 BPM
WBC # BLD AUTO: 13.7 X10*3/UL (ref 4.4–11.3)

## 2024-07-03 PROCEDURE — 99233 SBSQ HOSP IP/OBS HIGH 50: CPT | Performed by: NURSE PRACTITIONER

## 2024-07-03 PROCEDURE — 93005 ELECTROCARDIOGRAM TRACING: CPT

## 2024-07-03 PROCEDURE — 2500000002 HC RX 250 W HCPCS SELF ADMINISTERED DRUGS (ALT 637 FOR MEDICARE OP, ALT 636 FOR OP/ED)

## 2024-07-03 PROCEDURE — 2500000002 HC RX 250 W HCPCS SELF ADMINISTERED DRUGS (ALT 637 FOR MEDICARE OP, ALT 636 FOR OP/ED): Performed by: NURSE PRACTITIONER

## 2024-07-03 PROCEDURE — 94640 AIRWAY INHALATION TREATMENT: CPT

## 2024-07-03 PROCEDURE — 2500000004 HC RX 250 GENERAL PHARMACY W/ HCPCS (ALT 636 FOR OP/ED): Performed by: NURSE PRACTITIONER

## 2024-07-03 PROCEDURE — 82947 ASSAY GLUCOSE BLOOD QUANT: CPT

## 2024-07-03 PROCEDURE — 80048 BASIC METABOLIC PNL TOTAL CA: CPT

## 2024-07-03 PROCEDURE — 97535 SELF CARE MNGMENT TRAINING: CPT | Mod: GO,CO

## 2024-07-03 PROCEDURE — 2500000001 HC RX 250 WO HCPCS SELF ADMINISTERED DRUGS (ALT 637 FOR MEDICARE OP): Performed by: NURSE PRACTITIONER

## 2024-07-03 PROCEDURE — RXMED WILLOW AMBULATORY MEDICATION CHARGE

## 2024-07-03 PROCEDURE — 97530 THERAPEUTIC ACTIVITIES: CPT | Mod: GP,CQ

## 2024-07-03 PROCEDURE — 83735 ASSAY OF MAGNESIUM: CPT

## 2024-07-03 PROCEDURE — 2500000004 HC RX 250 GENERAL PHARMACY W/ HCPCS (ALT 636 FOR OP/ED)

## 2024-07-03 PROCEDURE — 2500000002 HC RX 250 W HCPCS SELF ADMINISTERED DRUGS (ALT 637 FOR MEDICARE OP, ALT 636 FOR OP/ED): Performed by: EMERGENCY MEDICINE

## 2024-07-03 PROCEDURE — 99232 SBSQ HOSP IP/OBS MODERATE 35: CPT

## 2024-07-03 PROCEDURE — 1200000002 HC GENERAL ROOM WITH TELEMETRY DAILY

## 2024-07-03 PROCEDURE — 2500000005 HC RX 250 GENERAL PHARMACY W/O HCPCS: Performed by: NURSE PRACTITIONER

## 2024-07-03 PROCEDURE — 97530 THERAPEUTIC ACTIVITIES: CPT | Mod: GO,CO

## 2024-07-03 PROCEDURE — 85025 COMPLETE CBC W/AUTO DIFF WBC: CPT

## 2024-07-03 PROCEDURE — 93010 ELECTROCARDIOGRAM REPORT: CPT | Performed by: INTERNAL MEDICINE

## 2024-07-03 PROCEDURE — 36415 COLL VENOUS BLD VENIPUNCTURE: CPT

## 2024-07-03 RX ORDER — AMIODARONE HYDROCHLORIDE 200 MG/1
200 TABLET ORAL 2 TIMES DAILY
Status: DISCONTINUED | OUTPATIENT
Start: 2024-07-03 | End: 2024-07-04 | Stop reason: HOSPADM

## 2024-07-03 RX ORDER — AMIODARONE HYDROCHLORIDE 200 MG/1
200 TABLET ORAL DAILY
Status: DISCONTINUED | OUTPATIENT
Start: 2024-07-08 | End: 2024-07-04 | Stop reason: HOSPADM

## 2024-07-03 RX ORDER — AMIODARONE HYDROCHLORIDE 200 MG/1
TABLET ORAL
Qty: 39 TABLET | Refills: 1 | Status: SHIPPED | OUTPATIENT
Start: 2024-07-03 | End: 2024-08-21 | Stop reason: WASHOUT

## 2024-07-03 RX ORDER — AMIODARONE HYDROCHLORIDE 200 MG/1
200 TABLET ORAL DAILY
Qty: 30 TABLET | Refills: 1 | Status: SHIPPED | OUTPATIENT
Start: 2024-07-08 | End: 2024-07-03

## 2024-07-03 ASSESSMENT — COGNITIVE AND FUNCTIONAL STATUS - GENERAL
MOBILITY SCORE: 17
PERSONAL GROOMING: A LITTLE
CLIMB 3 TO 5 STEPS WITH RAILING: A LITTLE
MOVING FROM LYING ON BACK TO SITTING ON SIDE OF FLAT BED WITH BEDRAILS: A LITTLE
EATING MEALS: A LITTLE
DRESSING REGULAR UPPER BODY CLOTHING: A LITTLE
WALKING IN HOSPITAL ROOM: A LITTLE
TOILETING: A LITTLE
DRESSING REGULAR LOWER BODY CLOTHING: A LITTLE
TURNING FROM BACK TO SIDE WHILE IN FLAT BAD: A LOT
HELP NEEDED FOR BATHING: A LITTLE
STANDING UP FROM CHAIR USING ARMS: A LITTLE
MOVING TO AND FROM BED TO CHAIR: A LITTLE
DAILY ACTIVITIY SCORE: 18

## 2024-07-03 ASSESSMENT — PAIN - FUNCTIONAL ASSESSMENT
PAIN_FUNCTIONAL_ASSESSMENT: 0-10

## 2024-07-03 ASSESSMENT — PAIN SCALES - GENERAL
PAINLEVEL_OUTOF10: 7
PAINLEVEL_OUTOF10: 0 - NO PAIN

## 2024-07-03 ASSESSMENT — ACTIVITIES OF DAILY LIVING (ADL): HOME_MANAGEMENT_TIME_ENTRY: 15

## 2024-07-03 NOTE — PROGRESS NOTES
Physical Therapy    Physical Therapy Treatment    Patient Name: Kamar Garcia  MRN: [id number]  Today's Date: 7/3/2024  Time Calculation  Start Time: 1037  Stop Time: 1050  Time Calculation (min): 13 min     SICU-8    Assessment/Plan   PT Assessment  PT Assessment Results: Decreased strength, Decreased endurance, Impaired balance, Decreased mobility, Decreased safety awareness, Pain  Rehab Prognosis: Good  Treatment Tolerance: Patient tolerated treatment well, Patient limited by fatigue  Medical Staff Made Aware: Yes  End of Session Communication: Bedside nurse  Assessment Comment: Patient continues to show progress with transfers/amb. Patient will benefit from additional PT to address deficits and improve mobility.  End of Session Patient Position: Up in chair (Call light, phone, and tray table within reach.)  PT Plan  Inpatient/Swing Bed or Outpatient: Inpatient  Treatment/Interventions: Bed mobility, Transfer training, Gait training, Balance training, Neuromuscular re-education, Strengthening, Endurance training, Range of motion, Therapeutic exercise, Therapeutic activity, Home exercise program  PT Plan: Ongoing PT  PT Frequency: 4 times per week  PT Discharge Recommendations: Low intensity level of continued care    PT Recommended Transfer Status: Assist x1, ww.    General Visit Information:   PT  Visit  PT Received On: 07/03/24  General  Missed Visit: Yes  Missed Visit Reason: Other (Comment)  Family/Caregiver Present: No  Prior to Session Communication: Bedside nurse  Patient Position Received: Up in chair (No alarm.)  General Comment: Pleasant and cooperative.    General Observations:   General Observation: Tele; Multiple monitor lines.    Subjective     Precautions:  Precautions  Medical Precautions: Fall precautions  Post-Surgical Precautions: Move in the Tube    Vital Signs:  Vital Signs  Heart Rate:  (88-94bpm during session.)  SpO2:  (On RA. SPO2 96-97% during session. No c/o SOB.)  BP:  (125/77mmHg before  session. 144/77mmHg after amb.)    Objective     Pain:  Pain Assessment  Pain Assessment: 0-10  0-10 (Numeric) Pain Score: 0 - No pain (Has pain patch on (L)side of his back; provides good pain relief.)    Cognition:  Cognition  Overall Cognitive Status: Within Functional Limits  Impulsive: Mildly    Balance:   Static Sitting Balance  Static Sitting-Comment/Number of Minutes: G  Dynamic Sitting Balance  Dynamic Sitting-Comments: G  Static Standing Balance  Static Standing-Comment/Number of Minutes: F+ with ww  Dynamic Standing Balance  Dynamic Standing-Comments: F+ with ww    Treatments:  Bed Mobility  Bed Mobility:  (No bed mobility perform 2° patient states he does not sleep in a bed here or at home; he sleeps in a recliner.)  Ambulation/Gait Training  Ambulation/Gait Training Performed: Yes  Ambulation/Gait Training 1  Surface 1: Level tile  Device 1: Rolling walker  Assistance 1:  (SBA)  Comments/Distance (ft) 1: ~300ft. Slow loreto. Step through gait pattern. Slightly decreased step height/length B. Steady maneuvering of ww with 90/180° turns. No LOB.  Transfers  Transfer: Yes  Transfer 1  Technique 1: Sit to stand, Stand to sit  Transfer Device 1:  (ww)  Transfer Level of Assistance 1:  (SBA)  Trials/Comments 1: (2x). v/c for safe hand placement and technique while keeping aware of MITT precautions. Slow transition of hands to/from ww. Benefits from elevated surfaces.          Outcome Measures:  [facility] Basic Mobility  Turning from your back to your side while in a flat bed without using bedrails: A little  Moving from lying on your back to sitting on the side of a flat bed without using bedrails: A lot  Moving to and from bed to chair (including a wheelchair): A little  Standing up from a chair using your arms (e.g. wheelchair or bedside chair): A little  To walk in hospital room: A little  Climbing 3-5 steps with railing: A little  Basic Mobility - Total Score: 17    Education Documentation  Precautions,  taught by Angela Vides PTA at 7/3/2024  1:51 PM.  Learner: Patient  Readiness: Acceptance  Method: Explanation, Demonstration  Response: Verbalizes Understanding, Needs Reinforcement  Comment: See therapy note.    Mobility Training, taught by Angela Vides PTA at 7/3/2024  1:51 PM.  Learner: Patient  Readiness: Acceptance  Method: Explanation, Demonstration  Response: Verbalizes Understanding, Needs Reinforcement  Comment: See therapy note.    EDUCATION:  Individual(s) Educated: Patient  Education Provided: Body Mechanics, Fall Risk, Home Safety, POC, Post-Op Precautions (Balance; Safety with transfers/amb; MITT precautions.)  Equipment:  (Patient wants a ww issued before discharge home.)  Patient Response to Education: Patient/Caregiver Verbalized Understanding of Information, Patient/Caregiver Performed Return Demonstration of Exercises/Activities, Patient/Caregiver Asked Appropriate Questions    Encounter Problems       Encounter Problems (Active)       PT Problem       Pt will demonstrate sup > sit and sit > sup bed mobility mod I (Not Progressing)       Start:  06/29/24    Expected End:  07/13/24            Pt will demo sit > stand and stand > sit transfer with LRD and mod I vs independent  (Progressing)       Start:  06/29/24    Expected End:  07/13/24            Pt will ambulate 50' with LRD and mod I vs independent, without LOB  (Progressing)       Start:  06/29/24    Expected End:  07/13/24            Pt will verbalize and demonstrate understanding of MITT precautions throughout therapy treatment (Progressing)       Start:  06/29/24    Expected End:  07/13/24

## 2024-07-03 NOTE — PROGRESS NOTES
[facility] Critical Care Medicine       Date:  7/3/2024  Patient:  Kamar Garcia  YOB: 1970  MRN:  [id number]   Admit Date:  6/28/2024  ========================================================================================================      History of Present Illness:  Kamar Garcia is a 53 y.o. year old male patient with Past Medical History of HTN, hypothyroidism, HFrEF (35-40%), HLD, prior CVA was evaluated in June by Dr. Lovell for surgical revascularization. He had stroke in June that prompted a LHC by echocardiogram. His LHC showed multivessel coronary artery disease including his LAD, D1, Circumflex, OM1, OM2 and RCA. In his best interest, patient opted for surgical revascularization.         Interval ICU Events:  6/28: Patient underwent CABG x 3 (LIMA-DIAG-LAD, SVG-OM2), Right Leg EVH and median sternotomy. Cardiopulmonary Bypass Time 118 min, XC time 95 min. Given 2200 ml of crystalloid fluid, 777 Cell Saver. UOP during procedure 1200mls. R and left Pleural Chest Tubes and 1 Mediastinal Chest tubes each with minimal sanguinous drainage. Required x 4 shocks s/p spb. Returned to ICU on nitroglycerin and epinephrine gtt due to low index    6/29: Off epinephrine gtt this am. Up to chair, minimal chest tube output overnight. Pain well controlled. Will plan to start BB this am, along with 40mg IV lasix, will add colchicine as well for pericardial rub. Plan to ambulate patient this am and remove chest tubes if no increase in output afterwards.     6/30: Continued off all vasoactive medications. Chest tubes removed, will removed cordis today and plan for transfer. Will start entresto today pending cardiology recommendations     7/1: Started entresto yesterday, diuresing well UOP 2.4L yesterday. Will plan to diurese again today. Cordis removed, V wires may insulate. Transfer out of ICU today      7/2:  Transferred back to ICU today for Afib RVR, requiring amio bolus x2 and gtt.  EP consult pending.       7/3: Switch Amio gtt to Amio PO BID. Converted to NSR yesterday afternoon, continued in NSR overnight, daily EKGs stable. Transfer to tele floor under CTS.    Medical History:  Past Medical History:   Diagnosis Date    Coronary artery disease     COVID-19     NOT VACCINATED    Dyslipidemia     HTN (hypertension)     Hyperlipidemia     Hypothyroidism     Stroke (Multi)      Past Surgical History:   Procedure Laterality Date    CARDIAC CATHETERIZATION      HERNIA REPAIR Right     inguinal     Medications Prior to Admission   Medication Sig Dispense Refill Last Dose    aspirin 81 mg EC tablet Take 1 tablet (81 mg) by mouth early in the morning..   6/28/2024 at 0300    atorvastatin (Lipitor) 40 mg tablet Take 1 tablet (40 mg) by mouth early in the morning..   6/27/2024    furosemide (Lasix) 40 mg tablet Take 1 tablet (40 mg) by mouth early in the morning..   6/27/2024    levothyroxine (Synthroid, Levoxyl) 50 mcg tablet Take 1 tablet (50 mcg) by mouth early in the morning..   6/27/2024    lisinopril 10 mg tablet Take 1 tablet (10 mg) by mouth early in the morning..   Past Week    metoprolol succinate XL (Toprol-XL) 50 mg 24 hr tablet Take 1 tablet (50 mg) by mouth early in the morning..   6/28/2024 at 0300    mupirocin (Bactroban) 2 % ointment Apply topically. Apply to both nostrils twice daily 5 days before surgery   6/27/2024     Patient has no known allergies.  Social History     Tobacco Use    Smoking status: Former     Types: Cigarettes    Smokeless tobacco: Former     Types: Snuff   Vaping Use    Vaping status: Never Used   Substance Use Topics    Alcohol use: Never    Drug use: Never     Family History   Problem Relation Name Age of Onset    Pancreatic cancer Mother      COPD Father         Review of Systems:  14 point review of systems was completed and negative except for those specially mention in my HPI    Physical Exam:    Heart Rate:  []   Temp:  [36 °C (96.8 °F)-36.4 °C (97.5 °F)]   Resp:  [20-35]  "  BP: ()/(53-93)   Height:  [175.3 cm (5' 9\")]   Weight:  [113 kg (248 lb 0.3 oz)-113 kg (250 lb)]   SpO2:  [88 %-98 %]     Physical Exam  Vitals reviewed.   Constitutional:       General: He is awake.      Appearance: He is overweight.   HENT:      Head: Normocephalic and atraumatic.      Mouth/Throat:      Mouth: Mucous membranes are moist.      Pharynx: Oropharynx is clear.   Eyes:      Conjunctiva/sclera: Conjunctivae normal.      Pupils: Pupils are equal, round, and reactive to light.   Cardiovascular:      Rate and Rhythm: Normal rate and regular rhythm.      Pulses: Normal pulses.      Heart sounds: Normal heart sounds. Heart sounds not distant. No murmur heard.     Comments: NSR  Pulmonary:      Effort: Pulmonary effort is normal.      Breath sounds: Normal breath sounds.      Comments: Diminished bases  Chest:      Comments: Medial Sternotomy Incision CDI   Abdominal:      General: Abdomen is flat. Bowel sounds are decreased.      Palpations: Abdomen is soft.   Musculoskeletal:      Cervical back: Full passive range of motion without pain.      Right lower leg: No edema.      Left lower leg: No edema.   Skin:     General: Skin is warm.      Capillary Refill: Capillary refill takes less than 2 seconds.   Neurological:      General: No focal deficit present.      Mental Status: He is alert and oriented to person, place, and time. Mental status is at baseline.   Psychiatric:         Mood and Affect: Mood normal.         Behavior: Behavior normal. Behavior is cooperative.         Thought Content: Thought content normal.         Judgment: Judgment normal.         Objective:    ECG 12 Lead    Result Date: 7/3/2024  Normal sinus rhythm Inferior infarct (cited on or before 30-JUN-2024) ST & T wave abnormality, consider lateral ischemia Abnormal ECG When compared with ECG of 03-JUL-2024 06:28, (unconfirmed) No significant change was found    ECG 12 Lead    Result Date: 7/2/2024  Sinus tachycardia Possible Left " atrial enlargement Inferior infarct (cited on or before 30-JUN-2024) T wave abnormality, consider lateral ischemia ** ** ACUTE MI / STEMI ** ** Consider right ventricular involvement in acute inferior infarct Abnormal ECG When compared with ECG of 02-JUL-2024 09:37, (unconfirmed) Sinus rhythm has replaced Atrial flutter ST more elevated in Inferior leads ST no longer depressed in Anterior leads    ECG 12 lead    Result Date: 7/2/2024  Atrial flutter with 2:1 AV conduction Inferior infarct (cited on or before 30-JUN-2024) ST & T wave abnormality, consider lateral ischemia Abnormal ECG When compared with ECG of 01-JUL-2024 06:31, Atrial flutter has replaced Sinus rhythm Vent. rate has increased BY  62 BPM ST less elevated in Inferior leads ST now depressed in Anterior leads    Transthoracic Echo (TTE) Limited    Result Date: 7/1/2024          [place] [id number]  Tel [phone] Fax [phone] TRANSTHORACIC ECHOCARDIOGRAM REPORT Patient Name:      ERWIN Gibbs Physician:    [id number] Viktor Gonzalez MD Study Date:        7/1/2024              Ordering Provider:    [id number] ERWIN MYERS MRN/PID:           [id number]              Fellow: Accession#:        [id number]          Nurse: Date of Birth/Age: 1970 / 53 years Sonographer:          Di MAHONEY Gender:            M                     Additional Staff: Height:            175.26 cm             Admit Date:           6/12/2024 Weight:            115.21 kg             Admission Status:     Inpatient -                                                                Routine BSA / BMI:         2.29 m2 / 37.51 kg/m2 Department Location:  [facility] Blood Pressure: 141 /90 mmHg Study Type:    TRANSTHORACIC ECHO (TTE)  LIMITED Diagnosis/ICD: Heart failure, unspecified-I50.9 Indication:    REASSESS LVEF S/P CABG 06-28-24 CPT Codes:     Echo Limited-[id number] Patient History: CABG:              On 6/28/2024. Pertinent History: CAD, HTN, Hyperlipidemia, Cardiomyopathy and CVA. Study Detail: The following Echo studies were performed: 2D, Doppler and color               flow. Technically challenging study due to poor acoustic windows,               postoperative dressings, patient lying in supine position,               prominent lung artifact and body habitus. Definity used as a               contrast agent for endocardial border definition. Total contrast               used for this procedure was 2 mL via IV push. Unable to obtain               suprasternal notch view. The patient was awake.  PHYSICIAN INTERPRETATION: Left Ventricle: Left ventricular ejection fraction is mildly decreased, by visual estimate at 40-45%. The left ventricular cavity size is normal. Spectral Doppler shows an impaired relaxation pattern of left ventricular diastolic filling. Left Atrium: The left atrium is mildly dilated. Right Ventricle: The right ventricle was not well visualized. Right ventricular systolic function not assessed. Right Atrium: The right atrium was not well visualized. Aortic Valve: The aortic valve was not well visualized. Aortic valve regurgitation was not assessed. Mitral Valve: The mitral valve is mildly thickened. Mitral valve regurgitation was not assessed. Tricuspid Valve: The tricuspid valve was not well visualized. Tricuspid regurgitation was not assessed. Pulmonic Valve: The pulmonic valve is not well visualized. The pulmonic valve regurgitation was not well visualized. Pericardium: A pericardial effusion was not well visualized. Aorta: The aortic root was not assessed.  CONCLUSIONS:  1. Poorly visualized anatomical structures due to suboptimal image quality.  2. Left ventricular ejection fraction is mildly decreased, by visual  estimate at 40-45%.  3. Spectral Doppler shows an impaired relaxation pattern of left ventricular diastolic filling.  4. Right ventricular systolic function not assessed. QUANTITATIVE DATA SUMMARY: LV SYSTOLIC FUNCTION BY 2D PLANIMETRY (MOD):                      Normal Ranges: EF-A4C View:    46 % (>=55%) EF-A2C View:    39 % EF-Biplane:     44 % EF-Visual:      43 % LV EF Reported: 43 %  [id number] Viktor Gonzalez MD Electronically signed on 7/1/2024 at 3:06:30 PM  ** Final **       Results for orders placed or performed during the hospital encounter of 06/28/24 (from the past 24 hour(s))   ECG 12 lead   Result Value Ref Range    Ventricular Rate 154 BPM    Atrial Rate 308 BPM    QRS Duration 90 ms    QT Interval 262 ms    QTC Calculation(Bazett) 419 ms    P Axis -62 degrees    R Axis -25 degrees    T Axis 147 degrees    QRS Count 25 beats    Q Onset 211 ms    P Onset 155 ms    P Offset 203 ms    T Offset 342 ms    QTC Fredericia 358 ms   POCT GLUCOSE   Result Value Ref Range    POCT Glucose 162 (H) 74 - 99 mg/dL   ECG 12 Lead   Result Value Ref Range    Ventricular Rate 107 BPM    Atrial Rate 107 BPM    CO Interval 142 ms    QRS Duration 106 ms    QT Interval 364 ms    QTC Calculation(Bazett) 485 ms    P Axis 41 degrees    R Axis -27 degrees    T Axis 117 degrees    QRS Count 18 beats    Q Onset 211 ms    P Onset 140 ms    P Offset 199 ms    T Offset 393 ms    QTC Fredericia 441 ms   POCT GLUCOSE   Result Value Ref Range    POCT Glucose 122 (H) 74 - 99 mg/dL   POCT GLUCOSE   Result Value Ref Range    POCT Glucose 130 (H) 74 - 99 mg/dL   Basic Metabolic Panel   Result Value Ref Range    Glucose 119 (H) 74 - 99 mg/dL    Sodium 134 (L) 136 - 145 mmol/L    Potassium 3.8 3.5 - 5.3 mmol/L    Chloride 95 (L) 98 - 107 mmol/L    Bicarbonate 28 21 - 32 mmol/L    Anion Gap 15 10 - 20 mmol/L    Urea Nitrogen 27 (H) 6 - 23 mg/dL    Creatinine 1.02 0.50 - 1.30 mg/dL    eGFR 88 >60 mL/min/1.73m*2    Calcium 9.3 8.6 - 10.3 mg/dL    Magnesium   Result Value Ref Range    Magnesium 2.13 1.60 - 2.40 mg/dL   CBC and Auto Differential   Result Value Ref Range    WBC 13.7 (H) 4.4 - 11.3 x10*3/uL    nRBC 0.0 0.0 - 0.0 /100 WBCs    RBC 5.00 4.50 - 5.90 x10*6/uL    Hemoglobin 14.1 13.5 - 17.5 g/dL    Hematocrit 41.6 41.0 - 52.0 %    MCV 83 80 - 100 fL    MCH 28.2 26.0 - 34.0 pg    MCHC 33.9 32.0 - 36.0 g/dL    RDW 13.8 11.5 - 14.5 %    Platelets 260 150 - 450 x10*3/uL    Neutrophils % 65.7 40.0 - 80.0 %    Immature Granulocytes %, Automated 0.4 0.0 - 0.9 %    Lymphocytes % 23.6 13.0 - 44.0 %    Monocytes % 8.9 2.0 - 10.0 %    Eosinophils % 0.7 0.0 - 6.0 %    Basophils % 0.7 0.0 - 2.0 %    Neutrophils Absolute 8.99 (H) 1.20 - 7.70 x10*3/uL    Immature Granulocytes Absolute, Automated 0.05 0.00 - 0.70 x10*3/uL    Lymphocytes Absolute 3.23 1.20 - 4.80 x10*3/uL    Monocytes Absolute 1.21 (H) 0.10 - 1.00 x10*3/uL    Eosinophils Absolute 0.09 0.00 - 0.70 x10*3/uL    Basophils Absolute 0.09 0.00 - 0.10 x10*3/uL   POCT GLUCOSE   Result Value Ref Range    POCT Glucose 127 (H) 74 - 99 mg/dL   ECG 12 Lead   Result Value Ref Range    Ventricular Rate 91 BPM    Atrial Rate 91 BPM    AK Interval 132 ms    QRS Duration 106 ms    QT Interval 396 ms    QTC Calculation(Bazett) 487 ms    P Axis -3 degrees    R Axis -3 degrees    T Axis 127 degrees    QRS Count 15 beats    Q Onset 209 ms    P Onset 143 ms    P Offset 199 ms    T Offset 407 ms    QTC Fredericia 455 ms   Assessment/Plan:     I am currently managing this critically ill patient for the following problems:     Neuro/Psych/Pain Ctrl/Sedation:  #Post-Op Pain  #Hx CVA  -Neuro checks, CAM Assessment, Delirium precautions  -PRN Dilaudid, tramadol, oxycodone, Tylenol for pain control  -Robaxin and Lidocaine patches     Respiratory/ENT:  #Post-Op Respiratory Insufficiency  -Duonebs q4h and PRN  -Keep SpO2>92%     Cardiovascular:  #Triple Vessel CAD s/p CABG x 3  #Afib RVR, new onset  Patient underwent CABG x 3  (LIMA-DIAG-LAD, SVG-OM2), Right Leg EVH and median sternotomy.   -Daily BMP, keep K >4, Mg > 2  -CTS and EP following  -continue ASA, statin, metop, entresto, colchicine, Lasix   -Will need to discontinue his home lisinopril at discharge    -Amio gtt transition to PO, BID for 5 days then every day after     GI:  -Cardiac diet     Renal/Volume Status (Intra & Extravascular):  -Monitor RFP  -Keep K>4, Mg>2    Endocrine  #Hyperglycemia, improved  -Monitor for s/s hypo and hyperglycemia     Infectious Disease:  -Monitor SIRS     Heme/Onc:  #Acute Blood Loss Anemia Post-Op  -Baseline Hgb 14.0  -Daily CBC  -Transfuse for Hgb goal > 7 or massive blood loss with hemodynamic instability     ORTHO/MSK:  -PT/OT, Cardiac Rehab   -OOB walking halls     Ethics/Code Status:  FULL CODE     :  DVT Prophylaxis: Heparin subcu   GI Prophylaxis: NA  Bowel Regimen: Docusate, Senna, Miralax  Diet: Cardiac Diet   CVC: NA  Modesta: NA  Gonzalez: NA   Restraints: NA  Dispo:  Transfer to tele under CTS    Critical Care Time: 40    Plan Discussed with Dr. Sanchez and CTS    JES Rachel-CNP  Critical Care Medicine   [facility]

## 2024-07-03 NOTE — PROGRESS NOTES
Kamar Garcia is a 53 y.o. male on day 5 of admission presenting with Coronary artery disease of native artery of native heart with stable angina pectoris (CMS-HCC).    Subjective   Yesterday morning prior to discharge, patient developed new onset A-fib with RVR.  Remained asymptomatic and hemodynamically stable.  Given 2 bolus doses of IV amiodarone followed by infusion with subsequent conversion within an hour to sinus rhythm.  Has remained in sinus rhythm since that time.  Transferred to ICU for amiodarone infusion and arrhythmia monitoring.  Overnight no recurring atrial arrhythmias.  Electrophysiology is following with recommendations to discontinue IV amiodarone and transition to p.o. 200 mg twice daily for 5 days followed by 200 mg daily with outpatient Holter monitor to be arranged.  This morning he is afebrile, maintaining sinus rhythm with adequate saturations on room air.  Clinically appears to be near euvolemic with equivocal length of stay fluid balance, overnight fluid balance -700 mL.  Plan to continue maintenance dose Lasix 40 mg daily with potassium supplementation.  Postoperative pain is well-controlled.  Ambulating independently.  Tolerating diet and moving bowels.  Stable to downgrade to telemetry status, plan to discharge in a.m. if remains free of arrhythmias.       Objective     Physical Exam  Constitutional:       General: He is not in acute distress.  HENT:      Head: Normocephalic.      Nose: Nose normal.      Mouth/Throat:      Mouth: Mucous membranes are moist.   Eyes:      Pupils: Pupils are equal, round, and reactive to light.   Cardiovascular:      Rate and Rhythm: Normal rate and regular rhythm.      Pulses: Normal pulses.      Heart sounds: Normal heart sounds.   Pulmonary:      Comments: Improving inspiratory effort, spirometry volumes exceeding 1000 mL  Sternum stable, small hematoma at manubrium unchanged otherwise sternotomy well-approximated without erythema  Bilateral breath  "sounds clear to auscultation  CT sutures intact no erythema  Abdominal:      General: Bowel sounds are normal.      Comments: LBM 7/2   Genitourinary:     Comments: Voiding clear yellow  Musculoskeletal:         General: Normal range of motion.      Cervical back: Normal range of motion.      Comments: Trace bilateral lower extremity edema   Skin:     General: Skin is warm and dry.      Comments: EVH incisions well-approximated without erythema or drainage   Neurological:      General: No focal deficit present.      Mental Status: He is alert and oriented to person, place, and time.   Psychiatric:         Mood and Affect: Mood normal.         Last Recorded Vitals  Blood pressure 131/83, pulse 86, temperature 36.4 °C (97.5 °F), temperature source Temporal, resp. rate 22, height 1.753 m (5' 9\"), weight 113 kg (248 lb 0.3 oz), SpO2 93%.  Intake/Output last 3 Shifts:  I/O last 3 completed shifts:  In: 1138.1 (10.1 mL/kg) [P.O.:700; I.V.:438.1 (3.9 mL/kg)]  Out: 1800 (16 mL/kg) [Urine:1800 (0.4 mL/kg/hr)]  Weight: 112.5 kg     Relevant Results           This patient currently has cardiac telemetry ordered; if you would like to modify or discontinue the telemetry order, click here to go to the orders activity to modify/discontinue the order.                 Assessment/Plan   Principal Problem:    Coronary artery disease of native artery of native heart with stable angina pectoris (CMS-[facility], located within [facility])    CAD; ICM  During work up for CVA, echo revealed moderately reduced LV function with EF 35-40%. Pt subsequently underwent LHC revealing multivessel CAD and was referred for surgical revascularization.  June 28th, 2024 pt presented to [facility] and underwent CABG x3 with LIMA-Diag-LAD, SVG-OM2; endoscopic harvest of R great saphenous vein; median sternotomy.   -Downgrade to telemetry  -Epicardial wires removed 7/1  -ASA and Statin daily  -Metoprolol 25mg BID; optimize as hemodynamics allow  -Entresto 24-26mg BID   -Lasix 40mg po daily, " KCL 20meq po daily  -Keep K>4 and Mag>2;  PRN replacement as ordered   -Multimodal pain regimen as ordered  -Bowel regimen of Miralax and Colace. Simethicone QID   -Cardiac diet. Protonix for GI ppx   -sub-q Heparin for DVT ppx   -Encourage IS. Supplemental oxygen as need for SPO2>90%  -Increase activity as tolerated; ambulate TID; OOB for all meals  -Therapy following; [facility] scores 17 (PT and OT)   -SW for DC planning  -CBC, BMP, Mag, CXR, and EKG in AM  -Possible discharge in a.m. if stable     Postoperative atrial fibrillation  POD #4, patient discharge order placed, developed A-fib with RVR.  No hemodynamic compromise.  Given IV amiodarone bolus and infusion with subsequent conversion to sinus rhythm within 1 hour.  Transferred to ICU for arrhythmia monitoring.  POD #5: No recurring atrial arrhythmias.  -EP consulted, appreciate assistance.  Transition to p.o. amiodarone 200 mg twice daily x 5 days followed by 200 mg daily with outpatient Holter to reassess  -No need for oral anticoagulation unless recurrence  -Monitor electrolytes, maintain potassium greater than 4.0, magnesium greater than 2.0  -Discharge in a.m. if no recurring atrial fib    Pericarditis (resolved)  Post operative pericarditis as evidenced by pericardial rub and mild leukocytosis.  POD#2: WBC 16.2 (remains afebrile without overt signs of infection on exam). Pericardial rub has nearly resolved   POD #3: WBCs trending down to 15.8, pericardial rub resolved  -Colchicine BID  -follow EKG     Hx of CVA  Presented to ED in June c/o left sided weakness. MRI confirmed acute/subacute infarcts R frontal and parietal lobes.  -ASA and Statin daily  -therapy following      HTN; HLD  -Metoprolol 25mg BID; optimize as hemodynamics allow  -Statin daily   -appreciate assistance of Cardiology colleagues      Hypothyroidism  -Continue home Synthroid          I spent 35 minutes in the professional and overall care of this patient.      Adrianna Saucedo,  APRN-CNP

## 2024-07-03 NOTE — PROGRESS NOTES
Occupational Therapy    OT Treatment    Patient Name: Kamar Garcia  MRN: [id number]  Today's Date: 7/3/2024  Time Calculation  Start Time: 1106  Stop Time: 1129  Time Calculation (min): 23 min        Assessment:  OT Assessment: Pt continues to progress towards goals. Pt cristy's improved understanding of MITT precautions and carryover with functional tasks.  End of Session Communication: Bedside nurse  End of Session Patient Position: Up in chair, Alarm off, not on at start of session (All needs met, call light within reach.)     Plan:  Treatment Interventions: ADL retraining, Functional transfer training, Endurance training, Equipment evaluation/education, Compensatory technique education  OT Frequency: 3 times per week  OT Discharge Recommendations: Low intensity level of continued care  OT Recommended Transfer Status: Stand by assist  Treatment Interventions: ADL retraining, Functional transfer training, Endurance training, Equipment evaluation/education, Compensatory technique education    Subjective   Previous Visit Info:  OT Last Visit  OT Received On: 07/03/24  General:  General  Reason for Referral: s/p CABG  Referred By: Lewis  Past Medical History Relevant to Rehab: LINA, HLD, HTN, CAD, 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  Pt transferring to F within hour per RN, possible d/c tomorrow per pt.  Pt transferred to ICU 7/2 d/t developing A-fib with RVR, HR in 150's-160's.  IV amiodarone started.  Pt medically appropriate this date and cleared for participation in OT tx.  Precautions:  Medical Precautions: Fall precautions  Post-Surgical Precautions: Move in the Tube (Pt demo'd good understanding of MITT precautions this date with adl and functional  transfers/mobility.)  Precautions Comment: monitor lines; tele  Vital Signs:  Vital Signs  Heart Rate: 92  Pain:  Pain Assessment  Pain Assessment: 0-10  0-10 (Numeric) Pain Score: 0 - No pain    Objective    Cognition:  Cognition  Overall Cognitive Status: Within Functional Limits  Orientation Level: Oriented X4  Impulsive: Mildly     Activities of Daily Living: UE Dressing  UE Dressing Level of Assistance: Minimum assistance (to don/doff shirt)  UE Dressing Where Assessed: Chair (sitting/standing position)  UE Dressing Comments: Educated on UB dressing techniques for donning overhead shirts.  Min assist to pull down over back. Pt educated on compensatory  techniques to fully pull shirt down over back    LE Dressing  LE Dressing: Yes  Pants Level of Assistance: Close supervision  LE Dressing Where Assessed: Chair  LE Dressing Comments: Pt demo'd good carryover with education from previous tx session. Pt able to complete LB clothing management with good adherence to MITT precautions.       Bed Mobility/Transfers: Transfer 1  Technique 1: Sit to stand, Stand to sit  Transfer Device 1:  (fww)  Transfer Level of Assistance 1:  (SBA)  Trials/Comments 1: x2 trials from chair level with good adherence to MITT precautions      Functional Mobility:  Functional Mobility  Functional Mobility Performed:  (Functional mobility household distances with fww with SBA)  Sitting Balance: G- dyn sitting balance     Standing Balance:  F/F+ dyn stand balance          Therapy/Activity: Balance/Neuromuscular Re-Education  Balance/Neuromuscular Re-Education Activity Performed:  (Pt tolerated ~5 min in stance with 0<->2 UE support with fair+ balance during adl's and functional transfers/mobility).  Pt tolerated 15 min of functional tasks with 2 restbreaks.      Outcome Measures:[facility] Daily Activity  Putting on and taking off regular lower body clothing: A little  Bathing (including washing, rinsing, drying): A little  Putting on and taking  off regular upper body clothing: A little  Toileting, which includes using toilet, bedpan or urinal: A little  Taking care of personal grooming such as brushing teeth: A little  Eating Meals: A little  Daily Activity - Total Score: 18        Education Documentation  ADL Training, taught by Jennifer L Felty, OTA at 7/3/2024  1:30 PM.  Learner: Patient  Readiness: Acceptance  Method: Explanation, Demonstration  Response: Verbalizes Understanding    EDUCATION:  Education  Individual(s) Educated: Patient  Education Provided:  (MITT precautions)  Education Comment: Pt educated on fall prevention techniques; safe transfer techniques including hand placement and positioning; techniques/strategies for balance improvement; compensatory adl techniques; safe body mechanics; energy conservation techniques.    Goals:  Encounter Problems       Encounter Problems (Active)       OT Goals       Pt will complete LB dressing with Peyman for use of AE.   (Progressing)       Start:  06/29/24    Expected End:  07/13/24            Patient will transfer to bed/chair/toilet with SBA.  (Progressing)       Start:  06/29/24    Expected End:  07/13/24            Pt will tolerate 15 minutes of activity with 1 rest break to improve endurance with I/ADL tasks.  (Progressing)       Start:  06/29/24    Expected End:  07/13/24            Pt will ambulate functional household distance with Peyman of use of FWW to complete I/ADL task.   (Progressing)       Start:  06/29/24    Expected End:  07/13/24

## 2024-07-03 NOTE — CARE PLAN
Problem: Skin  Goal: Decreased wound size/increased tissue granulation at next dressing change  Outcome: Progressing       The clinical goals for the shift include maintain NSR

## 2024-07-03 NOTE — NURSING NOTE
Medications delivered to bedside and the given to bedside RN to lock in patient's med cabinet for discharge. This RN Navigator educated patient on purpose, dosing times and side effects of each medication. Patient verbalized understanding but requested a review when his niece is present in room tomorrow at discharge. Reassured patient that discharging RN will review medications and last time medication was administered.

## 2024-07-03 NOTE — PROGRESS NOTES
Electrophysiology Progress Note  Patient: Kamar Garcia  Unit/Bed: 08/08-A  YOB: 1970  MRN: [id number]  Acct: [id number]   Admitting Diagnosis: Coronary artery disease of native artery of native heart with stable angina pectoris (CMS-HCC) [I25.118]  Date:  6/28/2024  Hospital Day: 5  Attending: Hubert Lovell MD   Rounding KEVIN/Cardiologist:  Laila Stephenson, APRN-CNP       SUBJECTIVE    Patient is sitting up in the chair at bedside with no current complaints.  He states that he feels much better today.  He denies complaints of lightheadedness, dizziness, chest pain, shortness of breath or palpitations.  Telemetry reveals SR 80s to 90s with no further atrial fibrillation.  He is tolerating his amiodarone well.  He is awaiting transfer to the [facility] with possible discharge 7/4/20/2024.    VITALS   Visit Vitals  /70   Pulse 86   Temp 35.9 °C (96.6 °F) (Temporal)   Resp 22      I/O:    Intake/Output Summary (Last 24 hours) at 7/3/2024 1130  Last data filed at 7/3/2024 0600  Gross per 24 hour   Intake 638.14 ml   Output 1800 ml   Net -1161.86 ml      Allergies:  No Known Allergies     PHYSICAL EXAM   Constitutional:       General: He is not in acute distress.  HENT:      Head: Normocephalic.      Nose: Nose normal.      Mouth/Throat:      Mouth: Mucous membranes are moist.   Eyes:      Pupils: Pupils are equal, round, and reactive to light.   Cardiovascular:      Rate and Rhythm: Normal rate and regular rhythm.      Pulses: Normal pulses.      Heart sounds: Normal heart sounds.   Pulmonary:      Comments: Improving inspiratory effort.  Sternum stable, small hematoma at manubrium unchanged otherwise sternotomy well-approximated without erythema  Bilateral breath sounds clear to auscultation  CT sutures intact no erythema  Abdominal:      General: Bowel sounds are normal.      Comments: Last BM 7/2   Genitourinary:     Comments: Voiding clear yellow  Musculoskeletal:         General: Normal range  of motion.      Cervical back: Normal range of motion.      Comments: Trace bilateral lower extremity edema   Skin:     General: Skin is warm and dry.      Comments: EVH incisions well-approximated without erythema or drainage   Neurological:      General: No focal deficit present.      Mental Status: He is alert and oriented to person, place, and time.   Psychiatric:         Mood and Affect: Mood normal.        LABS   CBC:   Results from last 7 days   Lab Units 07/03/24  0349 07/02/24 0453 07/01/24  0429   WBC AUTO x10*3/uL 13.7* 13.8* 15.8*   RBC AUTO x10*6/uL 5.00 5.08 5.36   HEMOGLOBIN g/dL 14.1 14.3 15.1   HEMATOCRIT % 41.6 42.4 44.8   MCV fL 83 84 84   MCH pg 28.2 28.1 28.2   MCHC g/dL 33.9 33.7 33.7   RDW % 13.8 13.5 13.6   PLATELETS AUTO x10*3/uL 260 242 196     CMP:    Results from last 7 days   Lab Units 07/03/24 0348 07/02/24  0453 07/01/24  0429   SODIUM mmol/L 134* 135* 134*   POTASSIUM mmol/L 3.8 4.0 3.7   CHLORIDE mmol/L 95* 98 99   CO2 mmol/L 28 28 25   BUN mg/dL 27* 24* 23   CREATININE mg/dL 1.02 1.04 0.98   GLUCOSE mg/dL 119* 115* 134*   CALCIUM mg/dL 9.3 9.1 9.5     Magnesium:  Results from last 7 days   Lab Units 07/03/24 0348 07/02/24 0453 07/01/24  0429   MAGNESIUM mg/dL 2.13 2.05 2.00       Current Facility-Administered Medications:     acetaminophen (Tylenol) tablet 650 mg, 650 mg, oral, q6h PRN, JES Aguilar-CNP    amiodarone (Nexterone) 360 mg in dextrose,iso-osm 200 mL (1.8 mg/mL) infusion (premix), 0.5-1 mg/min, intravenous, Continuous, SHYANN Siddiqui, Stopped at 07/03/24 0801    amiodarone (Pacerone) tablet 200 mg, 200 mg, oral, BID, SHYANN Aguilar, 200 mg at 07/03/24 0813    [START ON 7/8/2024] amiodarone (Pacerone) tablet 200 mg, 200 mg, oral, Daily, JES Aguilar-CNP    aspirin EC tablet 81 mg, 81 mg, oral, Daily, Adrianna Saucedo, APRN-CNP, 81 mg at 07/03/24 0813    atorvastatin (Lipitor) tablet 40 mg, 40 mg, oral, Nightly,  JES Glaser-CNP, 40 mg at 07/02/24 2012    bisacodyl (Dulcolax) suppository 10 mg, 10 mg, rectal, Daily PRN, SHYANN Glaser    colchicine tablet 0.6 mg, 0.6 mg, oral, Daily, JES Glaser-CNP, 0.6 mg at 07/03/24 0813    dextrose 50 % injection 25 g, 25 g, intravenous, q15 min PRN **OR** glucagon (Glucagen) injection 1 mg, 1 mg, intramuscular, q15 min PRN, SHYANN Glaser    docusate sodium (Colace) capsule 100 mg, 100 mg, oral, TID, JES Glaser-CNP, 100 mg at 07/03/24 0900    furosemide (Lasix) tablet 40 mg, 40 mg, oral, Daily, JES Glaser-CNP, 40 mg at 07/03/24 0814    heparin (porcine) injection 5,000 Units, 5,000 Units, subcutaneous, q8h, JES Aguilar-CNP, 5,000 Units at 07/03/24 0502    insulin lispro (HumaLOG) injection 0-15 Units, 0-15 Units, subcutaneous, Before meals & nightly, JES Glaser-CNP, 5 Units at 07/02/24 1101    ipratropium-albuteroL (Duo-Neb) 0.5-2.5 mg/3 mL nebulizer solution 3 mL, 3 mL, nebulization, q2h PRN, SHYANN Aguilar    ipratropium-albuteroL (Duo-Neb) 0.5-2.5 mg/3 mL nebulizer solution 3 mL, 3 mL, nebulization, TID, Jignesh Sanchez, , 3 mL at 07/03/24 0731    levothyroxine (Synthroid, Levoxyl) tablet 50 mcg, 50 mcg, oral, Daily, JES Glaser-CNP, 50 mcg at 07/03/24 0602    lidocaine 4 % patch 1 patch, 1 patch, transdermal, Daily, JES Glaser-CNP, 1 patch at 07/03/24 0813    magnesium hydroxide (Milk of Magnesia) 2,400 mg/10 mL suspension 10 mL, 10 mL, oral, Daily PRN, SHYANN Glaser    magnesium sulfate IV 2 g, 2 g, intravenous, q6h PRN, JES Glaser-CNP    magnesium sulfate IV 4 g, 4 g, intravenous, q6h PRN, SHYANN Glaser    methocarbamol (Robaxin) tablet 500 mg, 500 mg, oral, q8h CLAUDIA, JES Glaser-CNP, 500 mg at 07/03/24 0502    metoclopramide (Reglan) tablet 10 mg, 10 mg, oral, q6h PRN **OR**  metoclopramide (Reglan) injection 10 mg, 10 mg, intravenous, q6h PRN, Adrianna Saucedo APRN-CNP    metoprolol tartrate (Lopressor) tablet 25 mg, 25 mg, oral, BID, Adrianna Saucedo APRN-CNP, 25 mg at 07/03/24 0815    naloxone (Narcan) injection 0.2 mg, 0.2 mg, intravenous, q5 min PRN, Patricia Patel, [facility]    ondansetron ODT (Zofran-ODT) disintegrating tablet 4 mg, 4 mg, oral, q8h PRN **OR** ondansetron (Zofran) injection 4 mg, 4 mg, intravenous, q8h PRN, JES Glaser-CNP, 4 mg at 07/01/24 0532    oxyCODONE (Roxicodone) immediate release tablet 5 mg, 5 mg, oral, q4h PRN, JES Glaser-CNP, 5 mg at 07/02/24 0607    polyethylene glycol (Glycolax, Miralax) packet 17 g, 17 g, oral, BID, Adrianna Saucedo APRN-CNP, 17 g at 07/03/24 0815    potassium chloride CR (Klor-Con M20) ER tablet 20 mEq, 20 mEq, oral, q6h PRN, Adrianna Saucedo APRN-CNP, 20 mEq at 07/03/24 0506    potassium chloride CR (Klor-Con M20) ER tablet 20 mEq, 20 mEq, oral, Daily, Chad Galvin, JES-CNP, 20 mEq at 07/03/24 0819    potassium chloride CR (Klor-Con M20) ER tablet 40 mEq, 40 mEq, oral, q6h PRN, Adrianna Saucedo APRN-CNP, 40 mEq at 07/01/24 0807    sacubitriL-valsartan (Entresto) 24-26 mg per tablet 1 tablet, 1 tablet, oral, BID, JES Glaser-CNP, 1 tablet at 07/03/24 0813    simethicone (Mylicon) chewable tablet 80 mg, 80 mg, oral, 4x daily, JES Glaser-CNP, 80 mg at 07/03/24 0813    traMADol (Ultram) tablet 50 mg, 50 mg, oral, q6h PRN, Chad Galvin, APRN-CNP    ECG 12 Lead  Result Date: 7/3/2024  Normal sinus rhythm Inferior infarct (cited on or before 30-JUN-2024) ST & T wave abnormality, consider lateral ischemia Abnormal ECG When compared with ECG of 03-JUL-2024 06:28, (unconfirmed) No significant change was found    ECG 12 Lead  Result Date: 7/2/2024  Sinus tachycardia Possible Left atrial enlargement Inferior infarct (cited on or before 30-JUN-2024) T wave abnormality, consider  lateral ischemia ** ** ACUTE MI / STEMI ** ** Consider right ventricular involvement in acute inferior infarct Abnormal ECG When compared with ECG of 02-JUL-2024 09:37, (unconfirmed) Sinus rhythm has replaced Atrial flutter ST more elevated in Inferior leads ST no longer depressed in Anterior leads    ECG 12 lead  Result Date: 7/2/2024  Atrial flutter with 2:1 AV conduction Inferior infarct (cited on or before 30-JUN-2024) ST & T wave abnormality, consider lateral ischemia Abnormal ECG When compared with ECG of 01-JUL-2024 06:31, Atrial flutter has replaced Sinus rhythm Vent. rate has increased BY  62 BPM ST less elevated in Inferior leads ST now depressed in Anterior leads       Tele Monitoring: SR 80-90's. No further A-fib    Assessment/Plan:   1.   Multivessel coronary artery disease             -Post-op day #4 s/p CABG x 3 with a LIMA to diagonal to LAD and an SVG to OM per Dr. Lovell.             -Was doing well with plans for discharge today until he developed A-fib w/RVR this AM.  2.   Ischemic cardiomyopathy             -Preop LVEF 40 to 45%  -Initiated on GDMT per general cardiology   3.   New onset atrial fibrillation w/ RVR             -Patient given an IV Amiodarone bolus x 2 and initiated on Amiodarone gtt.  Transferred back to the ICU.  Patient converted back to SR/'s approximately 3 hours later.   -Maintaining SR 80s to 90s with no recurrence of atrial fibrillation.             -Continue Amiodarone 200 mg PO BID x 5 days starting 7/3/2024, then decrease to Amiodarone 200 mg daily thereafter.     -No need for anticoagulation at this time unless patient has recurrence of atrial fibrillation.             -Outpatient Holter and follow up with the EP service in 4-6 weeks have been arranged..   4.    Benign essential hypertension             -Stable  5.    Dyslipidemia             -On high intensity statin therapy     EP service will sign off.  Outpatient Holter monitor and follow-up with Dr. Sanford have  been arranged and appear on the AVS.  Do not hesitate to call with questions.       Electronically signed by SHYANN Schneider on 7/3/2024 at 11:30 AM

## 2024-07-03 NOTE — PROGRESS NOTES
Social Work Note Per pharmacy, pt found to have active Medicaid.  Notified HRS, Insurance Verification, Case Management, and TCC.  Pt's account updated accordingly by Insurance Verification.  No further SW needs identified at this time.  MARCELLO Gambino

## 2024-07-04 ENCOUNTER — APPOINTMENT (OUTPATIENT)
Dept: CARDIOLOGY | Facility: HOSPITAL | Age: 54
End: 2024-07-04
Payer: MEDICAID

## 2024-07-04 VITALS
BODY MASS INDEX: 36.57 KG/M2 | HEIGHT: 69 IN | RESPIRATION RATE: 18 BRPM | OXYGEN SATURATION: 97 % | DIASTOLIC BLOOD PRESSURE: 72 MMHG | TEMPERATURE: 95.2 F | WEIGHT: 246.91 LBS | SYSTOLIC BLOOD PRESSURE: 119 MMHG | HEART RATE: 91 BPM

## 2024-07-04 LAB
ANION GAP SERPL CALC-SCNC: 15 MMOL/L (ref 10–20)
ATRIAL RATE: 91 BPM
BASOPHILS # BLD AUTO: 0.09 X10*3/UL (ref 0–0.1)
BASOPHILS NFR BLD AUTO: 0.7 %
BUN SERPL-MCNC: 26 MG/DL (ref 6–23)
CALCIUM SERPL-MCNC: 9.6 MG/DL (ref 8.6–10.3)
CHLORIDE SERPL-SCNC: 95 MMOL/L (ref 98–107)
CO2 SERPL-SCNC: 29 MMOL/L (ref 21–32)
CREAT SERPL-MCNC: 1.08 MG/DL (ref 0.5–1.3)
EGFRCR SERPLBLD CKD-EPI 2021: 82 ML/MIN/1.73M*2
EOSINOPHIL # BLD AUTO: 0.16 X10*3/UL (ref 0–0.7)
EOSINOPHIL NFR BLD AUTO: 1.3 %
ERYTHROCYTE [DISTWIDTH] IN BLOOD BY AUTOMATED COUNT: 14.1 % (ref 11.5–14.5)
GLUCOSE BLD MANUAL STRIP-MCNC: 107 MG/DL (ref 74–99)
GLUCOSE BLD MANUAL STRIP-MCNC: 95 MG/DL (ref 74–99)
GLUCOSE SERPL-MCNC: 116 MG/DL (ref 74–99)
HCT VFR BLD AUTO: 44.2 % (ref 41–52)
HGB BLD-MCNC: 14.7 G/DL (ref 13.5–17.5)
IMM GRANULOCYTES # BLD AUTO: 0.04 X10*3/UL (ref 0–0.7)
IMM GRANULOCYTES NFR BLD AUTO: 0.3 % (ref 0–0.9)
LYMPHOCYTES # BLD AUTO: 2.74 X10*3/UL (ref 1.2–4.8)
LYMPHOCYTES NFR BLD AUTO: 22.1 %
MAGNESIUM SERPL-MCNC: 2.22 MG/DL (ref 1.6–2.4)
MCH RBC QN AUTO: 28.1 PG (ref 26–34)
MCHC RBC AUTO-ENTMCNC: 33.3 G/DL (ref 32–36)
MCV RBC AUTO: 84 FL (ref 80–100)
MONOCYTES # BLD AUTO: 1.08 X10*3/UL (ref 0.1–1)
MONOCYTES NFR BLD AUTO: 8.7 %
NEUTROPHILS # BLD AUTO: 8.28 X10*3/UL (ref 1.2–7.7)
NEUTROPHILS NFR BLD AUTO: 66.9 %
NRBC BLD-RTO: 0 /100 WBCS (ref 0–0)
P AXIS: -5 DEGREES
P OFFSET: 200 MS
P ONSET: 141 MS
PLATELET # BLD AUTO: 305 X10*3/UL (ref 150–450)
POTASSIUM SERPL-SCNC: 4.4 MMOL/L (ref 3.5–5.3)
PR INTERVAL: 138 MS
Q ONSET: 210 MS
QRS COUNT: 15 BEATS
QRS DURATION: 106 MS
QT INTERVAL: 396 MS
QTC CALCULATION(BAZETT): 487 MS
QTC FREDERICIA: 455 MS
R AXIS: -8 DEGREES
RBC # BLD AUTO: 5.24 X10*6/UL (ref 4.5–5.9)
SODIUM SERPL-SCNC: 135 MMOL/L (ref 136–145)
T AXIS: 118 DEGREES
T OFFSET: 408 MS
VENTRICULAR RATE: 91 BPM
WBC # BLD AUTO: 12.4 X10*3/UL (ref 4.4–11.3)

## 2024-07-04 PROCEDURE — 2500000002 HC RX 250 W HCPCS SELF ADMINISTERED DRUGS (ALT 637 FOR MEDICARE OP, ALT 636 FOR OP/ED): Performed by: NURSE PRACTITIONER

## 2024-07-04 PROCEDURE — 94640 AIRWAY INHALATION TREATMENT: CPT | Performed by: NURSE PRACTITIONER

## 2024-07-04 PROCEDURE — 82947 ASSAY GLUCOSE BLOOD QUANT: CPT

## 2024-07-04 PROCEDURE — 2500000001 HC RX 250 WO HCPCS SELF ADMINISTERED DRUGS (ALT 637 FOR MEDICARE OP): Performed by: NURSE PRACTITIONER

## 2024-07-04 PROCEDURE — 93005 ELECTROCARDIOGRAM TRACING: CPT

## 2024-07-04 PROCEDURE — 97116 GAIT TRAINING THERAPY: CPT | Mod: GP,CQ | Performed by: PHYSICAL THERAPY ASSISTANT

## 2024-07-04 PROCEDURE — 83735 ASSAY OF MAGNESIUM: CPT | Performed by: NURSE PRACTITIONER

## 2024-07-04 PROCEDURE — 36415 COLL VENOUS BLD VENIPUNCTURE: CPT | Performed by: NURSE PRACTITIONER

## 2024-07-04 PROCEDURE — 93010 ELECTROCARDIOGRAM REPORT: CPT | Performed by: INTERNAL MEDICINE

## 2024-07-04 PROCEDURE — 80048 BASIC METABOLIC PNL TOTAL CA: CPT | Performed by: NURSE PRACTITIONER

## 2024-07-04 PROCEDURE — 85025 COMPLETE CBC W/AUTO DIFF WBC: CPT | Performed by: NURSE PRACTITIONER

## 2024-07-04 PROCEDURE — 2500000004 HC RX 250 GENERAL PHARMACY W/ HCPCS (ALT 636 FOR OP/ED): Performed by: NURSE PRACTITIONER

## 2024-07-04 ASSESSMENT — COGNITIVE AND FUNCTIONAL STATUS - GENERAL
DRESSING REGULAR LOWER BODY CLOTHING: A LITTLE
DRESSING REGULAR UPPER BODY CLOTHING: A LITTLE
STANDING UP FROM CHAIR USING ARMS: A LITTLE
STANDING UP FROM CHAIR USING ARMS: A LITTLE
MOBILITY SCORE: 17
WALKING IN HOSPITAL ROOM: A LITTLE
MOVING TO AND FROM BED TO CHAIR: A LITTLE
TURNING FROM BACK TO SIDE WHILE IN FLAT BAD: A LITTLE
PERSONAL GROOMING: A LITTLE
EATING MEALS: A LITTLE
TURNING FROM BACK TO SIDE WHILE IN FLAT BAD: A LOT
MOVING TO AND FROM BED TO CHAIR: A LITTLE
DAILY ACTIVITIY SCORE: 18
HELP NEEDED FOR BATHING: A LITTLE
CLIMB 3 TO 5 STEPS WITH RAILING: A LITTLE
MOBILITY SCORE: 19
TOILETING: A LITTLE
MOVING FROM LYING ON BACK TO SITTING ON SIDE OF FLAT BED WITH BEDRAILS: A LITTLE
CLIMB 3 TO 5 STEPS WITH RAILING: A LITTLE
MOVING FROM LYING ON BACK TO SITTING ON SIDE OF FLAT BED WITH BEDRAILS: A LITTLE

## 2024-07-04 ASSESSMENT — PAIN SCALES - GENERAL
PAINLEVEL_OUTOF10: 0 - NO PAIN

## 2024-07-04 ASSESSMENT — PAIN - FUNCTIONAL ASSESSMENT: PAIN_FUNCTIONAL_ASSESSMENT: 0-10

## 2024-07-04 NOTE — PROGRESS NOTES
"Physical Therapy    Physical Therapy Treatment    Patient Name: Kamar Garcia  MRN: [id number]  Today's Date: 7/4/2024  Time Calculation  Start Time: 1011  Stop Time: 1023  Time Calculation (min): 12 min    Assessment/Plan   PT Assessment  End of Session Communication: Bedside nurse  End of Session Patient Position: Up in chair  PT Plan  Inpatient/Swing Bed or Outpatient: Inpatient  PT Plan  Treatment/Interventions: Transfer training, Gait training, Therapeutic activity, Positioning, Postural re-education  PT Plan: Ongoing PT  PT Frequency: 4 times per week  General Visit Information:   PT  Visit  PT Received On: 07/04/24  General  Prior to Session Communication: Bedside nurse (stating pt. ok to see, no cardiac issues, is in sinus rhythym and possible dc today.)  Patient Position Received: Up in room, Up in chair  General Comment:  (Pt. stating does not sleep in bed and has been sleeping in a recliner for some time \"I don't lie flat\")    Subjective   Precautions:  Precautions  Precautions Comment:  (MITT precautions)  Vital Signs:  Vital Signs  Heart Rate: 91  SpO2: 95 % (93 % after gait training)    Objective   Pain:  Pain Assessment  0-10 (Numeric) Pain Score: 0 - No pain     Treatments:  Ambulation/Gait Training  Ambulation/Gait Training Performed:  (modified independent with fww performing 300' distance slow controlled recipricol patternwith instruct to increase stride and assume more upright posture.)  Transfers  Transfer:  (sit to stand trials without use of UE's with supervision/mod ind. declined transfers /bed mobility stating sleeps in recliner)    Outcome Measures:  [facility] Basic Mobility  Turning from your back to your side while in a flat bed without using bedrails: A little  Moving from lying on your back to sitting on the side of a flat bed without using bedrails: A little  Moving to and from bed to chair (including a wheelchair): A little  Standing up from a chair using your arms (e.g. wheelchair or " bedside chair): A little  To walk in hospital room: None  Climbing 3-5 steps with railing: A little  Basic Mobility - Total Score: 19    EDUCATION:  Outpatient Education  Individual(s) Educated: Patient  Education Provided: Body Mechanics, Fall Risk, Home Safety, Post-Op Precautions, Posture  Education Comment:  (Pt. educated on postural correction with return demo requested, MITT precaution assurance , safe use of fww which was issued with adjustments made for height.)    Encounter Problems       Encounter Problems (Active)       PT Problem       Pt will demonstrate sup > sit and sit > sup bed mobility mod I (Progressing)       Start:  06/29/24    Expected End:  07/13/24            Pt will demo sit > stand and stand > sit transfer with LRD and mod I vs independent  (Progressing)       Start:  06/29/24    Expected End:  07/13/24            Pt will ambulate 50' with LRD and mod I vs independent, without LOB  (Progressing)       Start:  06/29/24    Expected End:  07/13/24            Pt will verbalize and demonstrate understanding of MITT precautions throughout therapy treatment (Progressing)       Start:  06/29/24    Expected End:  07/13/24               Pain - Adult

## 2024-07-04 NOTE — NURSING NOTE
discussed discharge, follow up, restrictions, medications and how and when to weigh self and document in log: which is to be presented at follow up appt.all this discussed with pt, pt sister and niece. There were two medications not delivered by [place] [facility] that were on the medication list, clarification with Arnulfo RO, instructions for pt was: no longer take colchicine as course completed 7/3, pt denies pain last few days so pt is to take tylenol as needed and if in am pt needs more than tylenol NP will call in tramadol order to patients Pharmacy. Iv's removed with tips intact, telepack removed and returned. All questions answered by Rn and patient and family both state there are no more questions, teach back complete with pt stating 3 restrictions and 3 reasons to call doctor. Pt dc to home with home care. Transported via wheeled chair to car

## 2024-07-04 NOTE — CARE PLAN
The patient's goals for the shift include      The clinical goals for the shift include pt will remain hemodynamically stable throughout shift    Over the shift, the patient did not make progress toward the following goals. Recommendations to address these barriers include medications, monitoring, movement and education.

## 2024-07-04 NOTE — PROGRESS NOTES
07/04/24 1038   Current Planned Discharge Disposition   Current Planned Discharge Disposition Home H     Patient approved for 2 RN visits with [facility], they are aware this was approved previously. Patient to discharge home today.

## 2024-07-05 ENCOUNTER — DOCUMENTATION (OUTPATIENT)
Dept: CARDIOLOGY | Facility: HOSPITAL | Age: 54
End: 2024-07-05
Payer: MEDICAID

## 2024-07-07 ENCOUNTER — HOME CARE VISIT (OUTPATIENT)
Dept: HOME HEALTH SERVICES | Facility: HOME HEALTH | Age: 54
End: 2024-07-07
Payer: MEDICAID

## 2024-07-07 VITALS
DIASTOLIC BLOOD PRESSURE: 64 MMHG | HEART RATE: 64 BPM | RESPIRATION RATE: 18 BRPM | SYSTOLIC BLOOD PRESSURE: 124 MMHG | OXYGEN SATURATION: 98 %

## 2024-07-07 PROCEDURE — G0299 HHS/HOSPICE OF RN EA 15 MIN: HCPCS

## 2024-07-07 ASSESSMENT — ACTIVITIES OF DAILY LIVING (ADL): ENTERING_EXITING_HOME: NEEDS ASSISTANCE

## 2024-07-07 ASSESSMENT — ENCOUNTER SYMPTOMS: DENIES PAIN: 1

## 2024-07-09 ASSESSMENT — ENCOUNTER SYMPTOMS
MUSCLE WEAKNESS: 1
CHANGE IN APPETITE: UNCHANGED
LOWER EXTREMITY EDEMA: 1
APPETITE LEVEL: GOOD

## 2024-07-09 ASSESSMENT — ACTIVITIES OF DAILY LIVING (ADL): OASIS_M1830: 03

## 2024-07-11 ENCOUNTER — CLINICAL SUPPORT (OUTPATIENT)
Dept: CARDIAC SURGERY | Facility: CLINIC | Age: 54
End: 2024-07-11
Payer: MEDICAID

## 2024-07-11 VITALS
HEART RATE: 78 BPM | SYSTOLIC BLOOD PRESSURE: 116 MMHG | OXYGEN SATURATION: 97 % | DIASTOLIC BLOOD PRESSURE: 90 MMHG | HEIGHT: 69 IN | BODY MASS INDEX: 35.55 KG/M2 | WEIGHT: 240 LBS | TEMPERATURE: 96.6 F

## 2024-07-11 DIAGNOSIS — Z95.1 S/P CABG X 3: ICD-10-CM

## 2024-07-11 LAB
ATRIAL RATE: 107 BPM
ATRIAL RATE: 308 BPM
ATRIAL RATE: 91 BPM
ATRIAL RATE: 91 BPM
P AXIS: -3 DEGREES
P AXIS: -5 DEGREES
P AXIS: -62 DEGREES
P AXIS: 41 DEGREES
P OFFSET: 199 MS
P OFFSET: 199 MS
P OFFSET: 200 MS
P OFFSET: 203 MS
P ONSET: 140 MS
P ONSET: 141 MS
P ONSET: 143 MS
P ONSET: 155 MS
PR INTERVAL: 132 MS
PR INTERVAL: 138 MS
PR INTERVAL: 142 MS
Q ONSET: 209 MS
Q ONSET: 210 MS
Q ONSET: 211 MS
Q ONSET: 211 MS
QRS COUNT: 15 BEATS
QRS COUNT: 15 BEATS
QRS COUNT: 18 BEATS
QRS COUNT: 25 BEATS
QRS DURATION: 106 MS
QRS DURATION: 90 MS
QT INTERVAL: 262 MS
QT INTERVAL: 364 MS
QT INTERVAL: 396 MS
QT INTERVAL: 396 MS
QTC CALCULATION(BAZETT): 419 MS
QTC CALCULATION(BAZETT): 485 MS
QTC CALCULATION(BAZETT): 487 MS
QTC CALCULATION(BAZETT): 487 MS
QTC FREDERICIA: 358 MS
QTC FREDERICIA: 441 MS
QTC FREDERICIA: 455 MS
QTC FREDERICIA: 455 MS
R AXIS: -25 DEGREES
R AXIS: -27 DEGREES
R AXIS: -3 DEGREES
R AXIS: -8 DEGREES
T AXIS: 117 DEGREES
T AXIS: 118 DEGREES
T AXIS: 127 DEGREES
T AXIS: 147 DEGREES
T OFFSET: 342 MS
T OFFSET: 393 MS
T OFFSET: 407 MS
T OFFSET: 408 MS
VENTRICULAR RATE: 107 BPM
VENTRICULAR RATE: 154 BPM
VENTRICULAR RATE: 91 BPM
VENTRICULAR RATE: 91 BPM

## 2024-07-11 ASSESSMENT — PATIENT HEALTH QUESTIONNAIRE - PHQ9
1. LITTLE INTEREST OR PLEASURE IN DOING THINGS: NOT AT ALL
2. FEELING DOWN, DEPRESSED OR HOPELESS: NOT AT ALL
SUM OF ALL RESPONSES TO PHQ9 QUESTIONS 1 AND 2: 0

## 2024-07-11 NOTE — PROGRESS NOTES
This 53 year old status post CABG  X 3 per Dr Lovell at [facility] on 6/28/24.  Patient discharged from hospital on 7/4/24.  Patient seen in office as a nurse post operative visit. Midline sternal incision is well approximated with no redness or drainage noted. Right leg vein harvest site is well approximated with no redness or drainage noted. Sutures X 2 removed from previous chest tube site which remain well approximated.  Lungs are clear bilaterally. He has no complaints of pain at the present time. All medications reviewed and all questions answered. He no edema of the lower extremities and pedal pulses are palpable bilaterally. He has been taking short walks with no shortness of breath. Heart sounds are normal. Bowel sounds are active with patient reporting normal bowel movements. His appetite has been good with diet reviewed and all questions answered. Instructed patient to follow up with his cardiologist and primary care physician. He has a follow up with Dr Lovell with a chest x-ray prior to his visit. Patient instructed to call with any questions or concerns.

## 2024-07-17 ENCOUNTER — HOME CARE VISIT (OUTPATIENT)
Dept: HOME HEALTH SERVICES | Facility: HOME HEALTH | Age: 54
End: 2024-07-17
Payer: MEDICAID

## 2024-07-17 VITALS
WEIGHT: 241 LBS | SYSTOLIC BLOOD PRESSURE: 138 MMHG | OXYGEN SATURATION: 99 % | RESPIRATION RATE: 18 BRPM | HEART RATE: 77 BPM | BODY MASS INDEX: 35.59 KG/M2 | DIASTOLIC BLOOD PRESSURE: 80 MMHG | TEMPERATURE: 97.7 F

## 2024-07-17 PROCEDURE — G0299 HHS/HOSPICE OF RN EA 15 MIN: HCPCS

## 2024-07-19 ENCOUNTER — DOCUMENTATION (OUTPATIENT)
Dept: SURGERY | Facility: CLINIC | Age: 54
End: 2024-07-19
Payer: MEDICAID

## 2024-07-19 ASSESSMENT — ACTIVITIES OF DAILY LIVING (ADL)
CURRENT_FUNCTION: CONTACT GUARD ASSIST
TRANSPORTATION: DEPENDENT
BATHING ASSESSED: 1
TRANSPORTATION ASSESSED: 1
PHYSICAL TRANSFERS ASSESSED: 1
OASIS_M1830: 02
PREPARING MEALS: NEEDS ASSISTANCE
BATHING_CURRENT_FUNCTION: CONTACT GUARD ASSIST
HOUSEKEEPING ASSESSED: 1
LIGHT HOUSEKEEPING: NEEDS ASSISTANCE
HOME_HEALTH_OASIS: 01

## 2024-07-19 ASSESSMENT — ENCOUNTER SYMPTOMS
LOWEST PAIN SEVERITY IN PAST 24 HOURS: 0/10
HIGHEST PAIN SEVERITY IN PAST 24 HOURS: 0/10
DENIES PAIN: 1
PAIN SEVERITY GOAL: 0/10
PERSON REPORTING PAIN: PATIENT
SUBJECTIVE PAIN PROGRESSION: RESOLVED

## 2024-08-03 LAB
NON-UH HIE ANION GAP:SCNC:PT:SER/PLAS:QN:: 12 MEQ/L (ref 6–16)
NON-UH HIE BASOPHILS/LEUKOCYTES:NFR.DF:PT:BLD:QN:AUTOMATED COUNT: 0.1 E9/L (ref 0–0.2)
NON-UH HIE BASOPHILS:NCNC:PT:BLD:QN:AUTOMATED COUNT: 1 % (ref 0–2)
NON-UH HIE CALCIUM:MCNC:PT:SER/PLAS:QN:: 9 MG/DL (ref 8.9–11.1)
NON-UH HIE CARBON DIOXIDE:SCNC:PT:SER/PLAS:QN:: 27 MMOL/L (ref 21–31)
NON-UH HIE CHLORIDE:SCNC:PT:SER/PLAS:QN:: 102 MMOL/L (ref 101–111)
NON-UH HIE CREATININE:MCNC:PT:SER/PLAS:QN:: 1.1 MG/DL (ref 0.5–1.3)
NON-UH HIE EGFR: 80 ML/MIN/1.73 M2
NON-UH HIE EOSINOPHILS/100 LEUKOCYTES:NFR:PT:BLD:QN:AUTOMATED COUNT: 0.8 % (ref 0–8)
NON-UH HIE EOSINOPHILS:NCNC:PT:BLD:QN:: 0.1 E9/L (ref 0–0.5)
NON-UH HIE ERYTHROCYTE DISTRIBUTION WIDTH:RATIO:PT:RBC:QN:AUTOMATED COUNT: 16 % (ref 10.9–14.2)
NON-UH HIE ERYTHROCYTE MEAN CORPUSCULAR HEMOGLOBIN CONCENTRATION:MCNC:PT:RB: 34.5 GM/DL (ref 31.4–36)
NON-UH HIE ERYTHROCYTE MEAN CORPUSCULAR HEMOGLOBIN:ENTMASS:PT:RBC:QN:AUTOMA: 29.1 PG (ref 27–34)
NON-UH HIE ERYTHROCYTE MEAN CORPUSCULAR VOLUME:ENTVOL:PT:RBC:QN:AUTOMATED C: 84.5 FL (ref 80–100)
NON-UH HIE ERYTHROCYTES:NCNC:PT:BLD:QN:AUTOMATED COUNT: 5.2 E12/L (ref 4.3–5.9)
NON-UH HIE GLUCOSE:MCNC:PT:SER/PLAS:QN:: 138 MG/DL (ref 55–199)
NON-UH HIE HEMATOCRIT:VFR:PT:BLD:QN:AUTOMATED COUNT: 43.7 % (ref 37.7–49)
NON-UH HIE HEMOGLOBIN:MCNC:PT:BLD:QN:: 15.1 GM/DL (ref 13.5–17.5)
NON-UH HIE LEUKOCYTES: 12.5 E9/L (ref 4–11)
NON-UH HIE LYMPHOCYTES:NCNC:PT:BLD:QN:: 1.8 E9/L (ref 1–4)
NON-UH HIE LYMPHOCYTES:NCNC:PT:BLD:QN:AUTOMATED COUNT: 14.3 % (ref 14–50)
NON-UH HIE MAGNESIUM:MCNC:PT:SER/PLAS:QN:: 2.1 MG/DL (ref 1.3–2.4)
NON-UH HIE MONOCYTES:NCNC:PT:BLD:QN:AUTOMATED COUNT: 1.2 E9/L (ref 0.2–1)
NON-UH HIE NEUTROPHILS/100 LEUKOCYTES:NFR:PT:BLD:QN:: 74.3 % (ref 36–75)
NON-UH HIE NEUTROPHILS:NCNC:PT:BLD:QN:AUTOMATED COUNT: 9.3 E9/L (ref 2–7.5)
NON-UH HIE PLATELET MEAN VOLUME:ENTVOL:PT:BLD:QN:AUTOMATED COUNT: 6.8 FL (ref 6.4–10.8)
NON-UH HIE PLATELET: 305 E9/L (ref 150–500)
NON-UH HIE POTASSIUM:SCNC:PT:SER/PLAS:QN:: 3.9 MMOL/L (ref 3.5–5.3)
NON-UH HIE SODIUM:SCNC:PT:SER/PLAS:QN:: 137 MMOL/L (ref 135–145)
NON-UH HIE THYROTROPIN:ACNC:PT:SER/PLAS:QN:: 5.36 MCIU/ML (ref 0.34–5.6)
NON-UH HIE UREA NITROGEN/CREATININE:MRTO:PT:SER/PLAS:QN:: 14 NO UNITS (ref 10–20)
NON-UH HIE UREA NITROGEN:MCNC:PT:SER/PLAS:QN:: 16 MG/DL (ref 5–21)

## 2024-08-05 ENCOUNTER — APPOINTMENT (OUTPATIENT)
Dept: CARDIOLOGY | Facility: CLINIC | Age: 54
End: 2024-08-05
Payer: MEDICAID

## 2024-08-05 DIAGNOSIS — I48.91 POSTOPERATIVE ATRIAL FIBRILLATION (MULTI): ICD-10-CM

## 2024-08-05 DIAGNOSIS — I97.89 POSTOPERATIVE ATRIAL FIBRILLATION (MULTI): ICD-10-CM

## 2024-08-05 PROCEDURE — 93227 XTRNL ECG REC<48 HR R&I: CPT | Performed by: INTERNAL MEDICINE

## 2024-08-05 PROCEDURE — 93225 XTRNL ECG REC<48 HRS REC: CPT | Performed by: INTERNAL MEDICINE

## 2024-08-13 ENCOUNTER — HOSPITAL ENCOUNTER (OUTPATIENT)
Dept: RADIOLOGY | Facility: HOSPITAL | Age: 54
Discharge: HOME | End: 2024-08-13
Payer: MEDICAID

## 2024-08-13 ENCOUNTER — OFFICE VISIT (OUTPATIENT)
Dept: CARDIAC SURGERY | Facility: CLINIC | Age: 54
End: 2024-08-13
Payer: MEDICAID

## 2024-08-13 VITALS
HEIGHT: 69 IN | OXYGEN SATURATION: 95 % | TEMPERATURE: 97.5 F | HEART RATE: 78 BPM | SYSTOLIC BLOOD PRESSURE: 99 MMHG | BODY MASS INDEX: 35.55 KG/M2 | WEIGHT: 240 LBS | DIASTOLIC BLOOD PRESSURE: 67 MMHG

## 2024-08-13 DIAGNOSIS — Z95.1 S/P CABG (CORONARY ARTERY BYPASS GRAFT): ICD-10-CM

## 2024-08-13 DIAGNOSIS — Z95.1 S/P CABG X 3: ICD-10-CM

## 2024-08-13 PROCEDURE — 71046 X-RAY EXAM CHEST 2 VIEWS: CPT | Performed by: RADIOLOGY

## 2024-08-13 PROCEDURE — 71046 X-RAY EXAM CHEST 2 VIEWS: CPT

## 2024-08-13 PROCEDURE — 99211 OFF/OP EST MAY X REQ PHY/QHP: CPT | Performed by: THORACIC SURGERY (CARDIOTHORACIC VASCULAR SURGERY)

## 2024-08-13 ASSESSMENT — ENCOUNTER SYMPTOMS
HEMATOLOGIC/LYMPHATIC NEGATIVE: 1
ENDOCRINE NEGATIVE: 1
ALLERGIC/IMMUNOLOGIC NEGATIVE: 1
GASTROINTESTINAL NEGATIVE: 1
CONSTITUTIONAL NEGATIVE: 1
NEUROLOGICAL NEGATIVE: 1
MUSCULOSKELETAL NEGATIVE: 1
PSYCHIATRIC NEGATIVE: 1
CHEST TIGHTNESS: 1
EYES NEGATIVE: 1

## 2024-08-13 NOTE — PROGRESS NOTES
Subjective   Patient ID: Kamar Garcia is a 53 y.o. male who presents for Post-op Visit.    HPI  This is a 53-year-old male with history of hypertension, hyperlipidemia, right frontal and parietal cerebral infarct, and hypothyroidism.  Underwent echocardiogram as part of a stroke workup in June and found to have significantly impaired LV systolic function with a left ventricular ejection unction of 35 to 40%.  He underwent coronary angiography which showed severe multivessel coronary artery disease and was referred to cardiac surgery.  6/28 patient underwent CABG x 3 with LIMA to LAD and diagonal, and saphenous vein graft to the second obtuse marginal.  Postoperative course in ICU was uneventful, medically optimized on GDMT, developed short course of postoperative atrial fibrillation which was ameliorated with IV amiodarone.  He was subsequently transition to p.o. dosing and had no recurrence.  Scheduled to follow-up with electrophysiology and Holter monitor in the near future.  Discharged to home on postoperative day 5 with home health care.    Since his discharge home the patient states that overall he has been doing well, he has noticed a progressive improvement in his activity tolerance and postoperative pain has been minimal.  On 8/2 he developed worsening midsternal chest pain and was taken to [facility].  EKG findings concerning for possible ischemic event with ST elevation in the inferior leads and reciprocal depression anterolaterally.  He was taken emergently to the cardiac Cath Lab, grafts were both widely patent and no significant progression of his native coronary disease.  Echocardiogram was then obtained showing significant improvement in left ventricular ejection fraction to 65%, and a circumferential pericardial effusion predominantly anteriorly loculated without evidence of tamponade.  He was initiated on colchicine with recommendations to repeat echocardiogram in 2 weeks which at  this point has not been scheduled.    At today's visit, the patient is in no distress.  He has had no recurring episodes of severe chest pain, he does note a feeling of pressure on occasion in the midsternal region.  He has had no further palpitations, no dyspnea at rest or with activity.  No PND or orthopnea.  Chest x-ray today is stable showing mild enlargement of the mediastinal silhouette, clear lung margins.    Review of Systems   Constitutional: Negative.    HENT: Negative.     Eyes: Negative.    Respiratory:  Positive for chest tightness.    Cardiovascular:  Positive for chest pain.   Gastrointestinal: Negative.    Endocrine: Negative.    Genitourinary: Negative.    Musculoskeletal: Negative.    Skin: Negative.    Allergic/Immunologic: Negative.    Neurological: Negative.    Hematological: Negative.    Psychiatric/Behavioral: Negative.         Objective   Physical Exam  Constitutional:       General: He is not in acute distress.     Appearance: Normal appearance.   HENT:      Head: Normocephalic.      Nose: Nose normal.      Mouth/Throat:      Mouth: Mucous membranes are moist.   Eyes:      Pupils: Pupils are equal, round, and reactive to light.   Cardiovascular:      Rate and Rhythm: Normal rate and regular rhythm.      Pulses: Normal pulses.      Heart sounds: Murmur heard.      No friction rub.   Pulmonary:      Effort: Pulmonary effort is normal.      Breath sounds: Normal breath sounds.      Comments: Sternum stable  Sternotomy well healed  Abdominal:      General: Bowel sounds are normal.   Musculoskeletal:         General: Normal range of motion.      Cervical back: Normal range of motion.      Right lower leg: No edema.      Left lower leg: No edema.   Skin:     General: Skin is warm and dry.      Comments: EVH incisions well-healed   Neurological:      General: No focal deficit present.      Mental Status: He is alert and oriented to person, place, and time.   Psychiatric:         Mood and Affect:  Mood normal.         Assessment/Plan     CAD s/p CABG  Status post CABG x 3 with LIMA graft to LAD and diagonal sequentially and saphenous vein graft to the second obtuse marginal.  -Postoperative recovery has been progressing satisfactorily.  Sternal precautions will be lifted, patient has been cleared to drive.  Instructed to maintain 10 to 15 pound weight lifting limitation for total of 12 weeks  -Continued follow-up with cardiology as scheduled    Pericardial effusion  Postoperative pericardial effusion noted during admission for chest pain, coronary angiography showing all grafts to be patent and no progression of native coronary disease.  Noted to have circumferential pericardial effusion with anterior loculation and no evidence of tamponade.  Currently asymptomatic.  -Finished course of colchicine  -Repeat echocardiogram  -Telephonic follow-up after echo    Ischemic cardiomyopathy  Preoperative LV dysfunction with left ventricular ejection fraction of 35 to 40%, now normalized at 65%. Clinically appears euvolemic    Postoperative atrial fibrillation  Brief episode of postoperative atrial fibrillation with RVR, converted to sinus rhythm and being maintained on amiodarone.  EP follow-up and Holter monitor is pending       SHYANN Glaser 08/13/24 6:11 PM

## 2024-08-21 ENCOUNTER — APPOINTMENT (OUTPATIENT)
Dept: CARDIOLOGY | Facility: CLINIC | Age: 54
End: 2024-08-21
Payer: MEDICAID

## 2024-08-21 VITALS
SYSTOLIC BLOOD PRESSURE: 110 MMHG | DIASTOLIC BLOOD PRESSURE: 76 MMHG | HEART RATE: 70 BPM | BODY MASS INDEX: 36.88 KG/M2 | HEIGHT: 69 IN | WEIGHT: 249 LBS

## 2024-08-21 DIAGNOSIS — Z51.81 ANTICOAGULATION MANAGEMENT ENCOUNTER: ICD-10-CM

## 2024-08-21 DIAGNOSIS — I10 PRIMARY HYPERTENSION: ICD-10-CM

## 2024-08-21 DIAGNOSIS — I50.20 SYSTOLIC HEART FAILURE, UNSPECIFIED HF CHRONICITY (MULTI): ICD-10-CM

## 2024-08-21 DIAGNOSIS — Z79.01 ANTICOAGULATION MANAGEMENT ENCOUNTER: ICD-10-CM

## 2024-08-21 DIAGNOSIS — R94.31 ABNORMAL EKG: ICD-10-CM

## 2024-08-21 DIAGNOSIS — Z87.891 FORMER SMOKER: ICD-10-CM

## 2024-08-21 DIAGNOSIS — I48.11 LONGSTANDING PERSISTENT ATRIAL FIBRILLATION (MULTI): Primary | ICD-10-CM

## 2024-08-21 PROCEDURE — 3078F DIAST BP <80 MM HG: CPT | Performed by: INTERNAL MEDICINE

## 2024-08-21 PROCEDURE — 99215 OFFICE O/P EST HI 40 MIN: CPT | Performed by: INTERNAL MEDICINE

## 2024-08-21 PROCEDURE — 3074F SYST BP LT 130 MM HG: CPT | Performed by: INTERNAL MEDICINE

## 2024-08-21 PROCEDURE — 1036F TOBACCO NON-USER: CPT | Performed by: INTERNAL MEDICINE

## 2024-08-21 PROCEDURE — 3008F BODY MASS INDEX DOCD: CPT | Performed by: INTERNAL MEDICINE

## 2024-08-21 RX ORDER — COLCHICINE 0.6 MG/1
1 TABLET ORAL
COMMUNITY
Start: 2024-08-03

## 2024-08-21 RX ORDER — AMIODARONE HYDROCHLORIDE 200 MG/1
200 TABLET ORAL DAILY
COMMUNITY
End: 2024-08-21 | Stop reason: ALTCHOICE

## 2024-08-21 RX ORDER — CLOPIDOGREL BISULFATE 75 MG/1
75 TABLET ORAL DAILY
COMMUNITY
Start: 2024-05-06

## 2024-08-21 ASSESSMENT — ENCOUNTER SYMPTOMS
DYSPNEA ON EXERTION: 0
PALPITATIONS: 0

## 2024-08-21 NOTE — PROGRESS NOTES
CARDIOLOGY OFFICE VISIT      CHIEF COMPLAINT  Chief Complaint   Patient presents with    Follow-up     Holter       HISTORY OF PRESENT ILLNESS  HPI  53 year old male with past medical history significant for  HTN, hypothyroidism, HFrEF (35-40%), HLD, prior CVA as well as recent CVA in June, 2024 which prompted a [facility] for further workup.  Patient was found to have severe multivessel CAD including disease in his LAD, diagonal, circumflex, OM1, OM2 and RCA.  Patient was referred to Dr. Lovell for surgical revascularization.  Patient was admitted electively on 6/28/2024 for coronary artery bypass grafting under the care of Dr. Lovell.  Patient is currently postop day #4 from CABG x 3 with a LIMA to diagonal to LAD and SVG to OM.  Patient was doing well in the postoperative period.  He was off vasoactive medications on 6/29.  His chest tubes were removed on 6/30.  He was initiated on beta-blocker therapy, Entresto and IV Lasix.  He was also initiated on colchicine for pericardial rub.  He was transferred to the [facility] on telemetry on 7/1.  He was to be discharged to home, however developed acute new onset atrial fibrillation w/ RVR with rates 160 to 180's.  He was given an IV amiodarone bolus and transferred to the ICU for initiation of amiodarone drip.  He was given an additional bolus of amiodarone upon arrival to the ICU.  Patient converted back to SR/ST shortly thereafter.    Patient continue oral amiodarone.    Since the discharge from the hospital he is doing well.  He denies any symptoms of chest pain or shortness breath or palpitations.    I have personally reviewed Holter monitor with patient that was performed in August 2024      Holter monitor August 2024  This is a Holter monitor for 48 hours.     The underlying rhythm was sinus rhythm at a rate of 94 bpm with occasional isolated PVCs.     The minimum heart rate was 60 bpm the maximal heart rate was 108 bpm average heart rate was 76 bpm.     There were occasionally  premature ventricular contractions (5.7% of the total beats).  PVCs presenting and isolated beats, ventricular couplets, ventricular triplets.  No sustained ventricular arrhythmias were seen.     There were occasionally isolated PACs but no evidence sustained atrial arrhythmias.     No significant pauses were evidence of high-grade AV block were seen during the study.     Patient did not report symptoms during this study.     Conclusion     Underlying rhythm was sinus rhythm.     Frequent PVCs of at least 2 different morphologies.  Asymptomatic.  Clinical correlation is to be made      Past Medical History  Past Medical History:   Diagnosis Date    Coronary artery disease     COVID-19     NOT VACCINATED    Dyslipidemia     HTN (hypertension)     Hyperlipidemia     Hypothyroidism     Stroke (Multi)        Social History  Social History     Tobacco Use    Smoking status: Former     Types: Cigarettes    Smokeless tobacco: Former     Types: Snuff   Vaping Use    Vaping status: Never Used   Substance Use Topics    Alcohol use: Never    Drug use: Never       Family History     Family History   Problem Relation Name Age of Onset    Pancreatic cancer Mother      COPD Father          Allergies:  No Known Allergies     Outpatient Medications:  Current Outpatient Medications   Medication Instructions    amiodarone (Pacerone) 200 mg tablet Take 1 tablet (200 mg) by mouth 2 times a day for 9 doses (4.5 days), THEN 1 tablet (200 mg) once daily.    amiodarone (PACERONE) 200 mg, oral, Daily    aspirin 81 mg EC tablet 1 tablet, oral, Daily (0630)    atorvastatin (Lipitor) 40 mg tablet 1 tablet, oral, Daily (0630)    clopidogrel (PLAVIX) 75 mg, oral, Daily    colchicine 0.6 mg tablet 1 tablet, oral, Daily (0630)    docusate sodium (COLACE) 100 mg, oral, 3 times daily    furosemide (LASIX) 40 mg, oral, Daily    levothyroxine (Synthroid, Levoxyl) 50 mcg tablet 1 tablet, oral, Daily (0630)    metoprolol tartrate (LOPRESSOR) 25 mg, oral,  2 times daily    potassium chloride CR (Klor-Con M20) 20 mEq ER tablet 20 mEq, oral, Daily, Do not crush or chew.    sacubitriL-valsartan (Entresto) 24-26 mg tablet 1 tablet, oral, 2 times daily    traMADol (ULTRAM) 50 mg, oral, Every 6 hours PRN          REVIEW OF SYSTEMS  Review of Systems   Cardiovascular:  Negative for chest pain, dyspnea on exertion and palpitations.   All other systems reviewed and are negative.        VITALS  Vitals:    08/21/24 1103   BP: 110/76   Pulse: 70       PHYSICAL EXAM  Constitutional:       General: Awake.      Appearance: Normal and healthy appearance. Overweight and not in distress.   Neck:      Vascular: No JVR. JVD normal.   Pulmonary:      Effort: Pulmonary effort is normal.      Breath sounds: Normal breath sounds. No wheezing. No rhonchi. No rales.   Chest:      Chest wall: Not tender to palpatation.   Cardiovascular:      PMI at left midclavicular line. Normal rate. Regular rhythm. Normal S1. Normal S2.       Murmurs: There is no murmur.      No gallop.  No click. No rub.   Pulses:     Intact distal pulses.   Edema:     Peripheral edema absent.   Abdominal:      Tenderness: There is no abdominal tenderness.   Musculoskeletal: Normal range of motion.         General: No tenderness. Skin:     General: Skin is warm and dry.   Neurological:      General: No focal deficit present.      Mental Status: Alert and oriented to person, place and time.           ASSESSMENT AND PLAN  1.   Multivessel coronary artery disease             -Post-op day #4 s/p CABG x 3 with a LIMA to diagonal to LAD and an SVG to OM per Dr. Lovell.             -Was doing well with plans for discharge today until he developed A-fib w/RVR this AM.  2.   Ischemic cardiomyopathy             -Preop LVEF 40 to 45%  -Initiated on GDMT per general cardiology   3.   New onset atrial fibrillation w/ RVR             -Patient given an IV Amiodarone bolus x 2 and initiated on Amiodarone gtt.  Transferred back to the ICU.   Patient converted back to SR/'s approximately 3 hours later.             On high risk medication (amiodarone)   4.    Benign essential hypertension             -Stable  5.    Dyslipidemia             -On high intensity statin therapy    Plan recommendations    Patient is doing well from the electrophysiology standpoint.  No recurrence of atrial fibrillation.  We will discontinue amiodarone therapy.    Follow my office in 3 months or sooner needed.    If he has recurrence of atrial fibrillation, we will offer him a different antiarrhythmic therapy and also anticoagulation therapy.    Risk factor modification and lifestyle modification discussed with patient. Diet , exercise and hydration discussed with patient.    I have personally review with patient during this office visit, laboratory data, echocardiogram results, stress test results, Holter-event monitor results prior and after the last electrophysiology visit. All questions has been answered.    Please excuse any errors in grammar or translation related to this dictation.  Voice recognition software was utilized to prepare this document.

## 2024-08-21 NOTE — PATIENT INSTRUCTIONS
Continue same medications/treatment.  Patient educated on proper medication use.  Patient educated on risk factor modification.  Please bring any lab results from other providers/physicians to your next appointment.    Please bring all medicines, vitamins, and herbal supplements with you when you come to the office.    Prescriptions will not be filled unless you are compliant with your follow up appointments or have a follow up appointment scheduled as per instruction of your physician. Refills should be requested at the time of your visit.    STOP amiodarone  Follow up with Dr. Brian in 3 months with EKG    ILindsey RN, AM SCRIBING FOR, AND IN THE PRESENCE OF DR. BERNADETTE BRIAN MD

## 2024-09-04 ENCOUNTER — HOSPITAL ENCOUNTER (OUTPATIENT)
Dept: CARDIOLOGY | Facility: CLINIC | Age: 54
Discharge: HOME | End: 2024-09-04
Payer: MEDICAID

## 2024-09-04 DIAGNOSIS — Z95.1 S/P CABG X 3: ICD-10-CM

## 2024-09-04 LAB
AORTIC VALVE MEAN GRADIENT: 4 MMHG
AORTIC VALVE PEAK VELOCITY: 1.25 M/S
AV PEAK GRADIENT: 6.3 MMHG
AVA (PEAK VEL): 3.26 CM2
AVA (VTI): 2.77 CM2
EJECTION FRACTION APICAL 4 CHAMBER: 63.1
EJECTION FRACTION: 51 %
LEFT VENTRICLE INTERNAL DIMENSION DIASTOLE: 4.64 CM (ref 3.5–6)
LEFT VENTRICULAR OUTFLOW TRACT DIAMETER: 2.4 CM
LV EJECTION FRACTION BIPLANE: 51 %
MITRAL VALVE E/A RATIO: 0.68

## 2024-09-04 PROCEDURE — 93306 TTE W/DOPPLER COMPLETE: CPT | Performed by: INTERNAL MEDICINE

## 2024-09-04 PROCEDURE — 93306 TTE W/DOPPLER COMPLETE: CPT

## 2024-09-10 ENCOUNTER — APPOINTMENT (OUTPATIENT)
Dept: CARDIAC SURGERY | Facility: CLINIC | Age: 54
End: 2024-09-10
Payer: MEDICAID

## 2024-09-18 ENCOUNTER — TELEMEDICINE (OUTPATIENT)
Dept: CARDIAC SURGERY | Facility: CLINIC | Age: 54
End: 2024-09-18
Payer: MEDICAID

## 2024-09-18 DIAGNOSIS — I31.39 PERICARDIAL EFFUSION (HHS-HCC): Primary | ICD-10-CM

## 2024-09-18 NOTE — PROGRESS NOTES
Follow up virtual visit. Previously admitted to 8/13 [facility] for chest pain, underwent cardiac cath, with findings of patent grafts and unchanged native anatomy. Echocardiogram showing improved LV systolic function, noted a pericardial effusion that was predominantly anteriorly loculated. Most recent clinic visit 8/13 following this admit, at that time asymptomatic. Repeat echo ordrered, showing near normal LV systolic function and no further evidence of pericardial effusion. Patient states he has been doing well, has started cardiac rehab. No dyspnea at rest, or with activity. Recent Holter monitor showing no recurring atrial fibrillation, amiodarone has been discontinued by electrophysiology. No further evaluation or follow up needed from surgical standpoint. Patient to maintain follow up with cardiology/EP as scheduled.

## 2024-10-02 ENCOUNTER — DOCUMENTATION (OUTPATIENT)
Dept: CARDIAC SURGERY | Facility: CLINIC | Age: 54
End: 2024-10-02
Payer: MEDICAID

## 2024-10-02 NOTE — PROGRESS NOTES
Kamar Garcia had cardiac surgery on 6/28/24 with Dr Lovell at [facility]. He was advised by his physician that  he may return to work on 9/28/24 with no restrictions.

## 2024-11-25 ENCOUNTER — APPOINTMENT (OUTPATIENT)
Dept: GENERAL RADIOLOGY | Age: 54
End: 2024-11-25

## 2024-11-25 ENCOUNTER — HOSPITAL ENCOUNTER (EMERGENCY)
Age: 54
Discharge: HOME | End: 2024-11-25
Attending: FAMILY MEDICINE

## 2024-11-25 VITALS
DIASTOLIC BLOOD PRESSURE: 79 MMHG | SYSTOLIC BLOOD PRESSURE: 113 MMHG | TEMPERATURE: 98 F | WEIGHT: 274 LBS | RESPIRATION RATE: 23 BRPM | HEART RATE: 74 BPM | OXYGEN SATURATION: 97 % | BODY MASS INDEX: 40.58 KG/M2 | HEIGHT: 69 IN

## 2024-11-25 DIAGNOSIS — R07.9 CHEST PAIN, UNSPECIFIED TYPE: Primary | ICD-10-CM

## 2024-11-25 DIAGNOSIS — M79.604 PAIN IN BOTH LOWER EXTREMITIES: ICD-10-CM

## 2024-11-25 DIAGNOSIS — M79.605 PAIN IN BOTH LOWER EXTREMITIES: ICD-10-CM

## 2024-11-25 LAB
ANION GAP SERPL CALCULATED.3IONS-SCNC: 12 MMOL/L (ref 9–17)
BASOPHILS # BLD: ABNORMAL K/UL (ref 0–0.2)
BASOPHILS NFR BLD: ABNORMAL % (ref 0–2)
BUN SERPL-MCNC: 14 MG/DL (ref 6–20)
BUN/CREAT SERPL: 13 (ref 9–20)
CALCIUM SERPL-MCNC: 9.6 MG/DL (ref 8.6–10.4)
CHLORIDE SERPL-SCNC: 99 MMOL/L (ref 98–107)
CO2 SERPL-SCNC: 26 MMOL/L (ref 20–31)
CREAT SERPL-MCNC: 1.1 MG/DL (ref 0.7–1.2)
EOSINOPHIL # BLD: 1.78 K/UL (ref 0–0.4)
EOSINOPHILS RELATIVE PERCENT: 18 % (ref 0–5)
ERYTHROCYTE [DISTWIDTH] IN BLOOD BY AUTOMATED COUNT: 14.4 % (ref 12.1–15.2)
GFR, ESTIMATED: 80 ML/MIN/1.73M2
GLUCOSE SERPL-MCNC: 94 MG/DL (ref 70–99)
HCT VFR BLD AUTO: 55 % (ref 41–53)
HGB BLD-MCNC: 18.6 G/DL (ref 13.5–17.5)
IMM GRANULOCYTES # BLD AUTO: ABNORMAL K/UL (ref 0–0.3)
IMM GRANULOCYTES NFR BLD: ABNORMAL %
LYMPHOCYTES NFR BLD: 2.57 K/UL (ref 1–4.8)
LYMPHOCYTES RELATIVE PERCENT: 26 % (ref 13–44)
MCH RBC QN AUTO: 28.3 PG (ref 26–34)
MCHC RBC AUTO-ENTMCNC: 33.8 G/DL (ref 31–37)
MCV RBC AUTO: 83.7 FL (ref 80–100)
MONOCYTES NFR BLD: 0.5 K/UL (ref 0–1)
MONOCYTES NFR BLD: 5 % (ref 5–9)
MORPHOLOGY: ABNORMAL
NEUTROPHILS NFR BLD: 51 % (ref 39–75)
NEUTS SEG NFR BLD: 5.05 K/UL (ref 2.1–6.5)
PLATELET # BLD AUTO: 259 K/UL (ref 140–450)
PMV BLD AUTO: 9.2 FL (ref 6–12)
POTASSIUM SERPL-SCNC: 4 MMOL/L (ref 3.7–5.3)
RBC # BLD AUTO: 6.57 M/UL (ref 4.5–5.9)
SODIUM SERPL-SCNC: 137 MMOL/L (ref 135–144)
TROPONIN I SERPL HS-MCNC: 22 NG/L (ref 0–22)
TROPONIN I SERPL HS-MCNC: 24 NG/L (ref 0–22)
WBC OTHER # BLD: 9.9 K/UL (ref 3.5–11)

## 2024-11-25 PROCEDURE — 71045 X-RAY EXAM CHEST 1 VIEW: CPT

## 2024-11-25 PROCEDURE — 85025 COMPLETE CBC W/AUTO DIFF WBC: CPT

## 2024-11-25 PROCEDURE — 36415 COLL VENOUS BLD VENIPUNCTURE: CPT

## 2024-11-25 PROCEDURE — 99285 EMERGENCY DEPT VISIT HI MDM: CPT

## 2024-11-25 PROCEDURE — 80048 BASIC METABOLIC PNL TOTAL CA: CPT

## 2024-11-25 PROCEDURE — 93005 ELECTROCARDIOGRAM TRACING: CPT | Performed by: FAMILY MEDICINE

## 2024-11-25 PROCEDURE — 84484 ASSAY OF TROPONIN QUANT: CPT

## 2024-11-25 RX ORDER — LEVOTHYROXINE SODIUM 75 UG/1
75 TABLET ORAL DAILY
COMMUNITY
Start: 2024-08-05

## 2024-11-25 RX ORDER — ATORVASTATIN CALCIUM 40 MG/1
40 TABLET, FILM COATED ORAL DAILY
COMMUNITY
Start: 2024-05-06

## 2024-11-25 RX ORDER — ASPIRIN 81 MG/1
81 TABLET ORAL DAILY
COMMUNITY
Start: 2024-04-28

## 2024-11-25 RX ORDER — ASPIRIN 81 MG/1
324 TABLET, CHEWABLE ORAL ONCE
Status: DISCONTINUED | OUTPATIENT
Start: 2024-11-25 | End: 2024-11-25 | Stop reason: HOSPADM

## 2024-11-25 RX ORDER — FUROSEMIDE 40 MG/1
40 TABLET ORAL DAILY
COMMUNITY
Start: 2024-04-28

## 2024-11-25 RX ORDER — ISOSORBIDE MONONITRATE 30 MG/1
30 TABLET, EXTENDED RELEASE ORAL DAILY
COMMUNITY
Start: 2024-09-24

## 2024-11-25 ASSESSMENT — PAIN DESCRIPTION - DESCRIPTORS: DESCRIPTORS: DULL

## 2024-11-25 ASSESSMENT — ENCOUNTER SYMPTOMS
NAUSEA: 0
SHORTNESS OF BREATH: 0

## 2024-11-25 ASSESSMENT — LIFESTYLE VARIABLES
HOW OFTEN DO YOU HAVE A DRINK CONTAINING ALCOHOL: NEVER
HOW MANY STANDARD DRINKS CONTAINING ALCOHOL DO YOU HAVE ON A TYPICAL DAY: PATIENT DOES NOT DRINK

## 2024-11-25 ASSESSMENT — PAIN SCALES - GENERAL: PAINLEVEL_OUTOF10: 2

## 2024-11-25 ASSESSMENT — HEART SCORE: ECG: NORMAL

## 2024-11-25 ASSESSMENT — PAIN DESCRIPTION - LOCATION: LOCATION: CHEST

## 2024-11-25 ASSESSMENT — PAIN - FUNCTIONAL ASSESSMENT: PAIN_FUNCTIONAL_ASSESSMENT: 0-10

## 2024-11-25 NOTE — ED PROVIDER NOTES
above    2)  Data Reviewed  My EKG interpretation:  As above    Decision Rules/Scores utilized: Heart score    Tests considered but not ordered and why: N/A    External Documents Reviewed: N/A    Imaging that is independently reviewed and interpreted by me as: As above    See more data below for the lab and radiology tests and orders.    3)  Treatment and Disposition    Patient repeat assessment:  As above    Disposition discussion with patient/family: Discharge    Case discussed with consulting clinician:  As above    Social determinants of health impacting treatment or disposition: N/A    Shared Decision Making: As above    Code Status Discussion: N/A      REASSESSMENT     As above      CRITICAL CARE TIME     Total Critical Care time was 0 minutes, excluding separately reportable procedures.  There was a high probability of clinically significant/life threatening deterioration in the patient's condition which required my urgent intervention.      PROCEDURES:  Unless otherwise noted below, none     Procedures    FINAL IMPRESSION      1. Chest pain, unspecified type    2. Pain in both lower extremities          DISPOSITION/PLAN     DISPOSITION Decision To Discharge 11/25/2024 01:22:40 PM           PATIENT REFERRED TO:  Follow-up with your established cardiologist in [facility]    Call       Maria D Vital, ARSLAN  [address] [facility] [address]  [phone]    Call         DISCHARGE MEDICATIONS:  Discharge Medication List as of 11/25/2024  1:24 PM          (Please note that portions of this note were completed with a voice recognition program.  Efforts were made to edit the dictations but occasionally words are mis-transcribed.)    Pravin Hill MD (electronically signed)  Attending Emergency Physician           Pravin Hill MD  11/25/24 7310

## 2024-11-26 LAB
EKG ATRIAL RATE: 69 BPM
EKG ATRIAL RATE: 77 BPM
EKG P AXIS: 74 DEGREES
EKG P AXIS: 75 DEGREES
EKG P-R INTERVAL: 162 MS
EKG P-R INTERVAL: 164 MS
EKG Q-T INTERVAL: 408 MS
EKG Q-T INTERVAL: 422 MS
EKG QRS DURATION: 90 MS
EKG QRS DURATION: 90 MS
EKG QTC CALCULATION (BAZETT): 452 MS
EKG QTC CALCULATION (BAZETT): 461 MS
EKG R AXIS: -42 DEGREES
EKG R AXIS: -55 DEGREES
EKG T AXIS: 89 DEGREES
EKG T AXIS: 93 DEGREES
EKG VENTRICULAR RATE: 69 BPM
EKG VENTRICULAR RATE: 77 BPM

## 2024-11-26 PROCEDURE — 93010 ELECTROCARDIOGRAM REPORT: CPT | Performed by: INTERNAL MEDICINE

## 2024-11-27 ENCOUNTER — APPOINTMENT (OUTPATIENT)
Dept: CARDIOLOGY | Facility: CLINIC | Age: 54
End: 2024-11-27
Payer: MEDICAID

## 2025-08-14 ENCOUNTER — HOSPITAL ENCOUNTER (EMERGENCY)
Age: 55
Discharge: HOME | End: 2025-08-14
Attending: EMERGENCY MEDICINE
Payer: COMMERCIAL

## 2025-08-14 VITALS
WEIGHT: 309.1 LBS | TEMPERATURE: 97.8 F | HEIGHT: 69 IN | HEART RATE: 75 BPM | SYSTOLIC BLOOD PRESSURE: 131 MMHG | BODY MASS INDEX: 45.78 KG/M2 | RESPIRATION RATE: 18 BRPM | DIASTOLIC BLOOD PRESSURE: 83 MMHG | OXYGEN SATURATION: 94 %

## 2025-08-14 DIAGNOSIS — R00.2 PALPITATIONS: Primary | ICD-10-CM

## 2025-08-14 LAB
ANION GAP SERPL CALCULATED.3IONS-SCNC: 12 MMOL/L (ref 9–17)
BUN SERPL-MCNC: 19 MG/DL (ref 6–20)
CALCIUM SERPL-MCNC: 9 MG/DL (ref 8.6–10.4)
CHLORIDE SERPL-SCNC: 101 MMOL/L (ref 98–107)
CO2 SERPL-SCNC: 24 MMOL/L (ref 20–31)
CREAT SERPL-MCNC: 1.2 MG/DL (ref 0.7–1.2)
ERYTHROCYTE [DISTWIDTH] IN BLOOD BY AUTOMATED COUNT: 13.7 % (ref 12.1–15.2)
GFR, ESTIMATED: 72 ML/MIN/1.73M2
GLUCOSE SERPL-MCNC: 135 MG/DL (ref 70–99)
HCT VFR BLD AUTO: 51.1 % (ref 41–53)
HGB BLD-MCNC: 16.7 G/DL (ref 13.5–17.5)
MCH RBC QN AUTO: 27.5 PG (ref 26–34)
MCHC RBC AUTO-ENTMCNC: 32.7 G/DL (ref 31–37)
MCV RBC AUTO: 84 FL (ref 80–100)
PLATELET # BLD AUTO: 272 K/UL (ref 140–450)
PMV BLD AUTO: 8.8 FL (ref 6–12)
POTASSIUM SERPL-SCNC: 4.2 MMOL/L (ref 3.7–5.3)
RBC # BLD AUTO: 6.08 M/UL (ref 4.5–5.9)
SODIUM SERPL-SCNC: 137 MMOL/L (ref 135–144)
TROPONIN I SERPL HS-MCNC: 21 NG/L (ref 0–22)
WBC OTHER # BLD: 10.7 K/UL (ref 3.5–11)

## 2025-08-14 PROCEDURE — 84484 ASSAY OF TROPONIN QUANT: CPT

## 2025-08-14 PROCEDURE — 85027 COMPLETE CBC AUTOMATED: CPT

## 2025-08-14 PROCEDURE — 99284 EMERGENCY DEPT VISIT MOD MDM: CPT

## 2025-08-14 PROCEDURE — 36415 COLL VENOUS BLD VENIPUNCTURE: CPT

## 2025-08-14 PROCEDURE — 93005 ELECTROCARDIOGRAM TRACING: CPT | Performed by: EMERGENCY MEDICINE

## 2025-08-14 PROCEDURE — 80048 BASIC METABOLIC PNL TOTAL CA: CPT

## 2025-08-14 RX ORDER — VALSARTAN 40 MG/1
40 TABLET ORAL
COMMUNITY
Start: 2025-06-02

## 2025-08-14 ASSESSMENT — PAIN - FUNCTIONAL ASSESSMENT: PAIN_FUNCTIONAL_ASSESSMENT: 0-10

## 2025-08-14 ASSESSMENT — ENCOUNTER SYMPTOMS
SHORTNESS OF BREATH: 0
CHEST TIGHTNESS: 0
CONSTIPATION: 0
ABDOMINAL PAIN: 0

## 2025-08-14 ASSESSMENT — PAIN DESCRIPTION - LOCATION: LOCATION: CHEST

## 2025-08-15 LAB
EKG ATRIAL RATE: 70 BPM
EKG P AXIS: 17 DEGREES
EKG P-R INTERVAL: 164 MS
EKG Q-T INTERVAL: 438 MS
EKG QRS DURATION: 96 MS
EKG QTC CALCULATION (BAZETT): 473 MS
EKG R AXIS: 62 DEGREES
EKG T AXIS: 86 DEGREES
EKG VENTRICULAR RATE: 70 BPM

## 2025-08-15 PROCEDURE — 93010 ELECTROCARDIOGRAM REPORT: CPT | Performed by: INTERNAL MEDICINE

## (undated) DEVICE — SYRINGE, 20 CC, LUER LOCK

## (undated) DEVICE — PRE-CLEAN KIT, BEDSIDE, FIRST STEP

## (undated) DEVICE — DRIVER, HI-TORQUE, Z-DRIVER

## (undated) DEVICE — COVER, BACK TABLE

## (undated) DEVICE — SUTURE, MONOCRYL, 4-0, 27 IN, PS-2, UNDYED

## (undated) DEVICE — HANDLE, LITE EZ, PLASTIC, DISP, LF

## (undated) DEVICE — TUBING, INSUFFLATION, LAPAROSCOPIC, FILTER, 10 FT

## (undated) DEVICE — Device

## (undated) DEVICE — TUBING PACK, OXYGENATOR, ADULT

## (undated) DEVICE — SUTURE, VICRYL 0, TAPER POINT, CT-1 VIOLET 27 INCH

## (undated) DEVICE — APPLIER, LIGACLIP, MULTIPLE, SMALL 9-3/8IN

## (undated) DEVICE — GOWN, SURGICAL, ROYAL SILK, XL, STERILE

## (undated) DEVICE — TAPE, PAPER, SURGICAL, MICROPORE, 3 IN X 10 YD, LF

## (undated) DEVICE — BLANKET, WATER, MUL-T-PAD, 15 X 22 IN, DISPOSABLE

## (undated) DEVICE — NEEDLE, SAFETY, 18 G X 1.5 IN

## (undated) DEVICE — RESERVOIRE, ATR 120 COLLECTION

## (undated) DEVICE — GOWN, SURGICAL, ROYAL SILK, LG, STERILE

## (undated) DEVICE — RETRACTOR, SUTURE, HOLDING, INSERT, OCTOBASE, DISPOSABLE

## (undated) DEVICE — BLADE, STERNUM, 32MM CUT EDGE

## (undated) DEVICE — DRAPE, SHEET, CARDIOVASCULAR, ANTIMICROBIAL, W/ANESTHESIA SCREEN, IOBAN 2, STERI DRAPE, 107 X 133 IN, DISPOSABLE, FABRIC, BLUE, STERILE

## (undated) DEVICE — GEL, ECOVUE, ULTRASOUND, 20GRAM, STERILE

## (undated) DEVICE — SENSOR, OXYGEN, ADULT, INVOS KIT

## (undated) DEVICE — STATLOCK, FOLEY SECURE, 3-WAY

## (undated) DEVICE — PROTECTOR, NERVE, ULNAR, PINK

## (undated) DEVICE — ELECTRODE, ELECTROSURGICAL, BLADE, 4IN, UN-INSULATED

## (undated) DEVICE — SUTURE, PROLENE, 4-0, 36 IN, RB1, DA, BLUE

## (undated) DEVICE — CLIP, LIGATING, HORIZON, WIDE SLOT, SMALL, TITANIUM

## (undated) DEVICE — TUBING, SUCTION, CONNECTING, NON-CONDUCTIVE, SURE GRIP CONNECTORS, 3/16 X 18 IN, PVC

## (undated) DEVICE — FILTER, IV, BLOOD, MICROAGGREGATE, 40 MIC, RBC TRANSFUSION

## (undated) DEVICE — STRAP, VELCRO, BODY, 4 X 60IN, NS

## (undated) DEVICE — SUTURE, NUROLON, 0, 18 IN, CT1, DETACHABLE, MULTIPACK, BLACK

## (undated) DEVICE — LABEL KIT, MEDICATION, OR EMC, STERILE

## (undated) DEVICE — OXYGENATOR FX 25, W/HR, ARTERIAL FILTER

## (undated) DEVICE — BLOWER MISTER KIT, CLEARVIEW, MALLEABLE SHAFT, W/TUBING, 16.5 CM

## (undated) DEVICE — GOWN, SURGICAL, SMARTGOWN, XLARGE, STERILE

## (undated) DEVICE — SOLUTION, SODIUM CHLORIDE 0.9%, 3000ML, BAG

## (undated) DEVICE — SET, AUTOTRANSFUSION, AT1

## (undated) DEVICE — SUTURE, MONOCRYL, 3-0, 18 IN, PS2, UNDYED

## (undated) DEVICE — CATHETER, IV, ANGIOCATH, 16 G X 1.88 IN, FEP POLYMER

## (undated) DEVICE — CLIP, SPRING, BULLDOG, FOGARTY, SOFT JAW, 6 MM, LF

## (undated) DEVICE — PACING CABLE, EXTENSION, 12 FT BLUE, DISPOSABLE

## (undated) DEVICE — TIP, SUCTION, YANKAUER, FLEXIBLE

## (undated) DEVICE — SHUNT, SENSOR

## (undated) DEVICE — SPONGE, HEMOSTATIC, GELATIN, SURGIFOAM, 8 X 12.5 CM X 10 MM

## (undated) DEVICE — SPONGE, LAP, XRAY DECT, 18IN X 18IN, W/MASTER DMT, STERILE

## (undated) DEVICE — GLOVE, SURGICAL, PROTEXIS PI , 6.5, PF, LF

## (undated) DEVICE — HOLSTER, JET SAFETY

## (undated) DEVICE — ADHESIVE, SKIN, LIQUIBAND EXCEED

## (undated) DEVICE — BLADE, ZDRIVE, STERNALOCK XP RIGID FIXATION

## (undated) DEVICE — TOWEL, OR, 17 X 27, 4 PK, WHITE, STERILE

## (undated) DEVICE — MICROCOAGULATION TEST, ACT+ TEST CUVETTE

## (undated) DEVICE — PUNCH, AORTIC 4MM, BULLET TIP

## (undated) DEVICE — PACING CABLE, EXTENSION, 12 FT BEIGE, DISPOSABLE

## (undated) DEVICE — ADAPTER, CARDIOPLEGIA, VENTING, Y, 19.1 CM

## (undated) DEVICE — KNIFE, MICRO, 15 DEG BLADE AND TIP, DISP

## (undated) DEVICE — CANNULA, VENOUS 2 STAGE 32/40

## (undated) DEVICE — MONITORING KIT, TRANSDUCER, RETROGRADE, MPS, W/EXTENSION, LF

## (undated) DEVICE — CONNECTOR, STRAIGHT, 0.5 X 0.5 IN

## (undated) DEVICE — LEAD, PACING, MYOCARDIAL, BIPOLAR, TEMPORARY

## (undated) DEVICE — CLIP, LIGATING, HORIZON, MEDIUM, TITANIUM

## (undated) DEVICE — CANNULA, MULTIPLE PERFUSION SET, 15"

## (undated) DEVICE — CANNULA, AORTIC, ROOT, STANDARD, FLANGE, RADIOPAQUE TIP, W/FLOW-GUARD, 9 FR X 14 CM

## (undated) DEVICE — MANIFOLD, 4 PORT NEPTUNE STANDARD

## (undated) DEVICE — SUTURE, STEEL, 7, 18 IN, CCS, SILVER

## (undated) DEVICE — SUTURE, ETHIBOND, XTRA, 30 IN, 0, CT-1, GREEN

## (undated) DEVICE — SYRINGE, 10 CC, LUER LOCK

## (undated) DEVICE — BONE WAX, 2.5G ABSORBABLE, OSTENE

## (undated) DEVICE — DRAPE, SHEET, XL

## (undated) DEVICE — TOWEL PACK 10-PK

## (undated) DEVICE — ATS SUCTION LINE

## (undated) DEVICE — BAG, DECANTER

## (undated) DEVICE — SOLUTION, INJECTION, USP, SODIUM CHLORIDE 0.9%, .9, NACL, 1000 ML, BAG

## (undated) DEVICE — SUTURE, PROLENE 4-0, TAPER POINT, SH-1 BLUE 30 INCH

## (undated) DEVICE — BOWL, BASIN, 32 OZ, STERILE

## (undated) DEVICE — SUTURE, PROLENE, 5-0, 36 IN, C-1, CV-11, BLUE

## (undated) DEVICE — SUTURE, PROLENE, 6-0, 30 IN, C-1, CV-11, BLUE

## (undated) DEVICE — SOLUTION, INJECTION, USP, PLASMALYTE A, PH 7.4, TYPE 1, 1000 ML, BAG

## (undated) DEVICE — KIT, TOURNIQUET, 7"

## (undated) DEVICE — SUTURE, VICRYL, 3-0, 27 IN, CT-1, UNDYED

## (undated) DEVICE — APPLICATOR, CHLORAPREP, W/ORANGE TINT, 26ML

## (undated) DEVICE — DEVICE, ENDOSCOPIC VESSEL HARVESTING, SAPHENA VENAPAX

## (undated) DEVICE — COVER, EQUIPMENT, SOLUTION, SLUSH, 112 X 168 CM, LF, STERILE

## (undated) DEVICE — DRAPE, FLUID WARMING

## (undated) DEVICE — CASSETTE, BLOOD, PLEGIC SET

## (undated) DEVICE — HANDLE, PH, FOR YANKAUER SUCTION DEVICE

## (undated) DEVICE — SUTURE, PROLENE, 2-0, 36 IN, SH, DA, BLUE

## (undated) DEVICE — INSERT, CLAMP, FOGARTY, SOFTJAW, 86MM, LF

## (undated) DEVICE — CANNULA, EOPA 20F W/O GUIDEWIRE

## (undated) DEVICE — CLIP, LIGATING, HORIZON, LARGE, TITANIUM